# Patient Record
Sex: FEMALE | Race: WHITE | NOT HISPANIC OR LATINO | Employment: OTHER | URBAN - METROPOLITAN AREA
[De-identification: names, ages, dates, MRNs, and addresses within clinical notes are randomized per-mention and may not be internally consistent; named-entity substitution may affect disease eponyms.]

---

## 2023-10-16 PROBLEM — N89.8 VAGINAL ITCHING: Status: ACTIVE | Noted: 2023-10-16

## 2023-10-16 PROBLEM — I77.811 ABDOMINAL AORTIC ECTASIA (CMS-HCC): Status: ACTIVE | Noted: 2023-10-16

## 2023-10-16 PROBLEM — E66.9 OBESITY: Status: ACTIVE | Noted: 2023-10-16

## 2023-10-16 PROBLEM — R05.3 CHRONIC COUGH: Status: ACTIVE | Noted: 2023-10-16

## 2023-10-16 PROBLEM — N95.8 GENITOURINARY SYNDROME OF MENOPAUSE: Status: ACTIVE | Noted: 2023-10-16

## 2023-10-16 PROBLEM — R39.15 URINARY URGENCY: Status: ACTIVE | Noted: 2023-10-16

## 2023-10-16 PROBLEM — L98.7 EXCESS SKIN OF ABDOMEN: Status: ACTIVE | Noted: 2023-10-16

## 2023-10-16 PROBLEM — E65 ABDOMINAL PANNUS: Status: ACTIVE | Noted: 2023-10-16

## 2023-10-16 PROBLEM — N39.41 URGE INCONTINENCE OF URINE: Status: ACTIVE | Noted: 2023-10-16

## 2023-10-16 PROBLEM — G47.09 OTHER INSOMNIA: Status: ACTIVE | Noted: 2023-10-16

## 2023-10-16 PROBLEM — F32.5 DEPRESSION, MAJOR, IN REMISSION (CMS-HCC): Status: ACTIVE | Noted: 2023-10-16

## 2023-10-16 PROBLEM — M19.039 OSTEOARTHRITIS OF WRIST: Status: ACTIVE | Noted: 2023-10-16

## 2023-10-16 PROBLEM — R35.1 NOCTURIA: Status: ACTIVE | Noted: 2023-10-16

## 2023-10-16 PROBLEM — R20.2 NUMBNESS AND TINGLING: Status: ACTIVE | Noted: 2023-10-16

## 2023-10-16 PROBLEM — R20.0 NUMBNESS AND TINGLING: Status: ACTIVE | Noted: 2023-10-16

## 2023-10-16 PROBLEM — E66.9 CLASS 2 OBESITY WITH BODY MASS INDEX (BMI) OF 35.0 TO 35.9 IN ADULT: Status: ACTIVE | Noted: 2023-10-16

## 2023-10-16 PROBLEM — L91.8 CUTANEOUS SKIN TAGS: Status: ACTIVE | Noted: 2023-10-16

## 2023-10-16 PROBLEM — E66.812 CLASS 2 OBESITY WITH BODY MASS INDEX (BMI) OF 35.0 TO 35.9 IN ADULT: Status: ACTIVE | Noted: 2023-10-16

## 2023-10-16 PROBLEM — J34.89 RHINORRHEA: Status: ACTIVE | Noted: 2023-10-16

## 2023-10-16 PROBLEM — H90.3 SENSORINEURAL HEARING LOSS OF BOTH EARS: Status: ACTIVE | Noted: 2023-10-16

## 2023-10-16 PROBLEM — R19.4 CHANGE IN BOWEL HABITS: Status: ACTIVE | Noted: 2023-10-16

## 2023-10-16 PROBLEM — F41.9 ANXIETY: Status: ACTIVE | Noted: 2023-10-16

## 2023-10-16 PROBLEM — N30.20 CHRONIC CYSTITIS: Status: ACTIVE | Noted: 2023-10-16

## 2023-10-16 PROBLEM — Z79.899 LONG-TERM CURRENT USE OF BENZODIAZEPINE: Status: ACTIVE | Noted: 2023-10-16

## 2023-10-16 PROBLEM — R73.01 IMPAIRED FASTING GLUCOSE: Status: ACTIVE | Noted: 2023-10-16

## 2023-10-16 PROBLEM — R60.9 DEPENDENT EDEMA: Status: ACTIVE | Noted: 2023-10-16

## 2023-10-16 PROBLEM — K21.9 GERD (GASTROESOPHAGEAL REFLUX DISEASE): Status: ACTIVE | Noted: 2023-10-16

## 2023-10-16 PROBLEM — E78.00 HYPERCHOLESTEREMIA: Status: ACTIVE | Noted: 2023-10-16

## 2023-10-16 RX ORDER — ALBUTEROL SULFATE 90 UG/1
2 AEROSOL, METERED RESPIRATORY (INHALATION) AS NEEDED
COMMUNITY
Start: 2018-01-12 | End: 2024-02-19 | Stop reason: SDUPTHER

## 2023-10-16 RX ORDER — VIT C/ZN GLUC/HERBAL NO.325 90 MG-15MG
LOZENGE MUCOUS MEMBRANE
Status: ON HOLD | COMMUNITY
Start: 2021-08-24 | End: 2023-11-25 | Stop reason: WASHOUT

## 2023-10-16 RX ORDER — OMEPRAZOLE 20 MG/1
20 CAPSULE, DELAYED RELEASE ORAL
COMMUNITY
Start: 2014-05-19

## 2023-10-16 RX ORDER — BUPROPION HYDROCHLORIDE 300 MG/1
300 TABLET ORAL EVERY MORNING
COMMUNITY
Start: 2017-06-29 | End: 2023-10-24 | Stop reason: SDUPTHER

## 2023-10-16 RX ORDER — KETOROLAC TROMETHAMINE 10 MG/1
10 TABLET, FILM COATED ORAL 4 TIMES DAILY PRN
Status: ON HOLD | COMMUNITY
End: 2023-11-25 | Stop reason: WASHOUT

## 2023-10-16 RX ORDER — VITAMIN B COMPLEX
1 CAPSULE ORAL DAILY
COMMUNITY
Start: 2014-05-19 | End: 2024-03-11 | Stop reason: ALTCHOICE

## 2023-10-16 RX ORDER — ECHINACEA 500 MG
1 CAPSULE ORAL 2 TIMES DAILY
COMMUNITY
Start: 2021-04-21 | End: 2024-03-11 | Stop reason: ALTCHOICE

## 2023-10-16 RX ORDER — BIOTIN 5000 MCG
1 TABLET,DISINTEGRATING ORAL DAILY
Status: ON HOLD | COMMUNITY
Start: 2014-05-19 | End: 2023-11-25 | Stop reason: WASHOUT

## 2023-10-16 RX ORDER — CLOTRIMAZOLE AND BETAMETHASONE DIPROPIONATE 10; .64 MG/G; MG/G
1 CREAM TOPICAL 2 TIMES DAILY
Status: ON HOLD | COMMUNITY
Start: 2021-08-24 | End: 2023-11-25 | Stop reason: WASHOUT

## 2023-10-16 RX ORDER — GINKGO BILOBA LEAF EXTRACT 60 MG
CAPSULE ORAL
COMMUNITY
Start: 2014-05-19 | End: 2024-03-11 | Stop reason: ALTCHOICE

## 2023-10-16 RX ORDER — SOLIFENACIN SUCCINATE 5 MG/1
1 TABLET, FILM COATED ORAL EVERY EVENING
Status: ON HOLD | COMMUNITY
Start: 2021-08-24 | End: 2023-11-25 | Stop reason: WASHOUT

## 2023-10-16 RX ORDER — CALCIUM/MAGNESIUM 300-300 MG
1 TABLET ORAL DAILY
COMMUNITY
Start: 2014-05-19 | End: 2024-03-11 | Stop reason: ALTCHOICE

## 2023-10-16 RX ORDER — ESTRADIOL 0.1 MG/G
2 CREAM VAGINAL 3 TIMES WEEKLY
Status: ON HOLD | COMMUNITY
Start: 2021-08-24 | End: 2023-11-25 | Stop reason: WASHOUT

## 2023-10-16 RX ORDER — LORAZEPAM 1 MG/1
1 TABLET ORAL 2 TIMES DAILY PRN
Status: ON HOLD | COMMUNITY
Start: 2014-05-19 | End: 2023-11-25 | Stop reason: WASHOUT

## 2023-10-16 RX ORDER — PEPPERMINT OIL
OIL (ML) MISCELLANEOUS
COMMUNITY
Start: 2021-08-24

## 2023-10-17 ENCOUNTER — APPOINTMENT (OUTPATIENT)
Dept: UROLOGY | Facility: CLINIC | Age: 73
End: 2023-10-17
Payer: MEDICARE

## 2023-10-18 RX ORDER — LORAZEPAM 1 MG/1
1 TABLET ORAL 2 TIMES DAILY PRN
Status: CANCELLED | OUTPATIENT
Start: 2023-10-18

## 2023-10-19 DIAGNOSIS — F41.1 GAD (GENERALIZED ANXIETY DISORDER): ICD-10-CM

## 2023-10-19 RX ORDER — LORAZEPAM 1 MG/1
1 TABLET ORAL 2 TIMES DAILY
Qty: 60 TABLET | Refills: 1 | Status: SHIPPED | OUTPATIENT
Start: 2023-10-19 | End: 2023-10-24 | Stop reason: SDUPTHER

## 2023-10-19 NOTE — PROGRESS NOTES
Non billable service , reviewed chart after patient called and requested lorazepam . She still needs to have the urine drug screen , will ask nursing to check with her to see if she did have it done already .ordered lorazepam , oarrs with no concerns

## 2023-10-23 ENCOUNTER — DOCUMENTATION (OUTPATIENT)
Dept: BEHAVIORAL HEALTH | Facility: CLINIC | Age: 73
End: 2023-10-23
Payer: MEDICARE

## 2023-10-23 NOTE — PROGRESS NOTES
Non billable note after received message that the task is overdue . On 10/19 /23 lorazepam was sent to pharmacy , and shows transmission to pharmacy in system .  Ran oarrs today to verify was filled  , patient filled lorazepam on oct 19th , 2023, most recently. She has not called me back ,. Staff last week indicated she was asking for a call. Need to verify that we have results for uds , however she is scheduled for a routine follow up appointment tomorrow . Notified staff that I am [facility]

## 2023-10-24 ENCOUNTER — OFFICE VISIT (OUTPATIENT)
Dept: BEHAVIORAL HEALTH | Facility: CLINIC | Age: 73
End: 2023-10-24
Payer: MEDICARE

## 2023-10-24 DIAGNOSIS — G47.09 OTHER INSOMNIA: ICD-10-CM

## 2023-10-24 DIAGNOSIS — F41.1 GAD (GENERALIZED ANXIETY DISORDER): Primary | ICD-10-CM

## 2023-10-24 DIAGNOSIS — F32.5 DEPRESSION, MAJOR, IN REMISSION (CMS-HCC): ICD-10-CM

## 2023-10-24 PROCEDURE — 99214 OFFICE O/P EST MOD 30 MIN: CPT | Performed by: CLINICAL NURSE SPECIALIST

## 2023-10-24 PROCEDURE — 1160F RVW MEDS BY RX/DR IN RCRD: CPT | Performed by: CLINICAL NURSE SPECIALIST

## 2023-10-24 PROCEDURE — 1159F MED LIST DOCD IN RCRD: CPT | Performed by: CLINICAL NURSE SPECIALIST

## 2023-10-24 RX ORDER — BUPROPION HYDROCHLORIDE 300 MG/1
300 TABLET ORAL EVERY MORNING
Qty: 90 TABLET | Refills: 1 | Status: SHIPPED | OUTPATIENT
Start: 2023-10-24 | End: 2023-12-05 | Stop reason: SDUPTHER

## 2023-10-24 RX ORDER — LORAZEPAM 1 MG/1
1 TABLET ORAL 2 TIMES DAILY
Qty: 60 TABLET | Refills: 1 | Status: SHIPPED | OUTPATIENT
Start: 2023-10-24 | End: 2024-04-10 | Stop reason: SDUPTHER

## 2023-10-24 NOTE — PROGRESS NOTES
Outpatient Psychiatry      Subjective   Janee Maddox, a 73 y.o. female, presents as an established patient for an appointment with outpatient psychiatry for follow up/medication management .    Diagnosis:   Generalized anxiety disorder  Depression major in remission    Other insomnia     Patient Active Problem List   Diagnosis    Abdominal aortic ectasia (CMS/HCC)    Abdominal pannus    Anxiety    Change in bowel habits    Chronic cough    Chronic cystitis    Cutaneous skin tags    Dependent edema    Depression, major, in remission (CMS/HCC)    Excess skin of abdomen    Genitourinary syndrome of menopause    GERD (gastroesophageal reflux disease)    Hypercholesteremia    Impaired fasting glucose    Nocturia    Numbness and tingling    Obesity    Osteoarthritis of wrist    Other insomnia    Rhinorrhea    Sensorineural hearing loss of both ears    Urge incontinence of urine    Urinary urgency    Vaginal itching    Class 2 obesity with body mass index (BMI) of 35.0 to 35.9 in adult    Long-term current use of benzodiazepine       Treatment Goals:  Specify outcomes written in observable, behavioral terms:   Anxiety: reducing negative automatic thoughts and reducing physical symptoms of anxiety    Treatment Plan/Recommendations:      can continue lorazepam 1 mg , twice a day , and can continue bupropion xl 300 mg , each morning , can continue melatonin 10 mg , at bedtime as needed. can call  , for treatment concerns or can call  for any treatment concerns.and scheduling concerns , can follow up for medications in 8 weeks. can continue self care and wellness efforts and maintain other appointments with other medical providers.     Follow-up plan was discussed with patient.    Review with patient: Treatment plan reviewed with the patient.  Medication risks/benefit reviewed with the patient    HPI:  History of Present Illness  no concerns with daily activities   no concerns with memory   no  issues with falls   no issues with substance abuse   reconciled medication list , in the [facility] emr , and psychoeducation provided   support provided   work has been busy   anxiety is manageable       I have personally reviewed the [place] report for JIMENA HERNANDEZGREGG. I have considered the risks of abuse, dependence, addiction and diversion.   I have the following concerns: no concerns on oarrs.   Is the patient prescribed a combination of a benzodiazepine and opioid? No.   Last urine drug screening date/ordered today: uds ordered 6/12/23 patient says she had this done , need to call lab   Date of the last Controlled Substance Agreement: signed paper copy , csa in person appointment on 10/24/23  BENZODIAZEPINES   What is the patient's goal of therapy? to manage pepe and support daily functioning.  Is this being achieved with current treatment? yes , she can attend to daily activities without interruption of intense anxiety.   PEPE-7   1. Feeling nervous, anxious or on edge- nearly every day  2. Not being able to stop or control worrying - nearly every day  3. Worrying too much about different things - nearly every day  4. Trouble relaxing - more than half the days  5. Being so restless that it is hard to sit still - not at all  6. Becoming easily annoyed or irritable - several days  7. Feeling afraid as if something awful might happen - not at all  Total Score = 12  Activities of Daily Living:   Yes, it is my opinion that this patient is benefitting from benzodiazepine therapy.   Physical functioning: Same   Family relationships: Same   Social relationships: Same   Mood: Same   Sleep patterns: Same   Overall functioning: Better   Current or Past Use of Non-Controlled Medication: Dopamine/Norepinephrine Reuptake Inhibitor.      Review of Systems     Depressive Symptoms: not depressed or irritable,~no loss of interest,~no change in appetite,~no recent lb weight gain,~no recent lb weight loss,~no insomnia,~no fatigue or loss  of energy,~not feeling worthless or guilty,~normal concentration,~ability to make decisions,~no suicidal ideation,~no guns or weapons in household.   Manic Symptoms: mood is not irritable or elevated,~self esteem is not grandiose or increased,~no changes in need for sleep,~not more talkative than usual,~does not have flight of ideas or racing thoughts,~no distractibility,~no psychomotor agitation or increased goal-directed activity,~no excessive involvement in pleasurable activities.   Psychotic Symptoms: no hallucinations,~no delusions,~no disorganized speech,~does not have disorganized behavior or catatonia,~no negative symptoms.   Anxiety Symptoms: difficulty controlling worry,~increased arousal,~exposure to traumatic event,~re-experiencing of traumatic event, but~no panic attacks,~no concerns about future panic attacks,~no worry about panic attack consequences,~no change in behavior due to panic attacks,~no excessive worry,~not easily fatigued due to worry,~no difficulty concentrating due to worry,~no irritability due to worry,~no muscle tension due to worry,~no sleep disturbances due to worry,~no specific phobia,~no social phobia,~no obsessions,~no compulsions,~no avoidance of stimuli and number of responsiveness,~no restlessness / feeling on edge due to worry.   Delirium/ Altered Mental Status Symptoms: no disturbances of consciousness,~no diminished ability to focus, sustain, shift attention,~no change in cognition or perceptual disturbances,~symptoms do not fluctuate during the course of the day,~general medical condition is not present.   Disordered Eating Symptoms: weight is not less than 85% of ideal body weight,~no intense fear of gaining weight,~does not have a poor body image,~no restricting of diet and/or excessive exercise,~no purging or laxative use.   Post-traumatic stress disorder symptoms The patient is currently asymptomatic.   Inattentive Symptoms: does not make careless mistakes often,~does  not have difficulty paying attention,~not often disorganized,~does not lose things often,~is not easily distracted,~is not often forgetful,~does not avoid/dislike tasks with sustained mental effort,~listens when spoken to directly,~is able to follow instructions and finish schoolwork.   Conduct Issues: no aggression towards people or animals,~no destruction of property,~no deceitfulness,~does not violate rules.   Other Symptoms/ Concerns: no symptoms of separation anxiety,~no reactive attachment symptoms,~no motor tics,~no vocal tics,~no stuttering,~no phonological problems,~no loss of urine control,~no encopresis,~no intellectual disability,~no self-injurious behaviors,~not somatic and no conversion symptoms,~no gender identity symptoms,~no sleep disorder symptoms,~no impulse control symptoms,~no personality disorder symptoms.       Constitutional: no sleep apnea,~normal sleeping,~no night wakings,~no snoring,~not a picky eater,~normal appetite,~no swallowing problems,~no night terrors,~no nightmares,~no restless sleep,~no snorts/gasps~and~no obesity.   Eyes: vision impairment~and~wears glasses/contacts, but~no vision test,~does not wear glassess/contacts~and~no blindness.   ENT: no hearing tested,~no hearing loss,~no hearing aid,~no cochlear implant,~no excessive drooling,~no dental problems~and~no recurrent strep throat.   Cardiovascular: no murmur,~no heart defect,~no chest pain,~no palpitations~and~no syncope.   Respiratory: no wheezing~and~no asthma/reactive airway disease.   Gastrointestinal: no constipation,~no abdominal pain,~no nausea,~no vomiting,~no diarrhea,~no blood in stools,~no g-tube~and~no reflux.   Genitourinary: no nocturnal enuresis,~no diurnal enuresis~and~no incontinence.   Musculoskeletal: normal gait~and~no torticollis, but~moving all extremities well and symmetrical,~no arthritis or joint problems,~no myalgias,~no muscle weakness~and~normal hand preference.   Integumentary: no changes in  moles or birthmarks,~no rashes~and~no atopic dermatitis.   Neurological: no symmetrical facies,~no headache,~no head injury,~no seizures,~no staring spells,~no loss of consciousness,~no meningitis/encephalitis,~no cerebral palsy,~no spina bifida,~no stereotypy,~no developmental regression~and~no tics or twitches.   Endocrine: no temperature intolerance,~,~good growth~and~no failure to thrive.   Hematologic/Lymphatic: no anemia~and~no lead poisoning.        Current Medications:    Current Outpatient Medications:     albuterol (Ventolin HFA) 90 mcg/actuation inhaler, Inhale 2 puffs if needed for wheezing or shortness of breath (every 4 to 6 hours as needed)., Disp: , Rfl:     b complex vitamins capsule, Take 1 capsule by mouth once daily., Disp: , Rfl:     buPROPion XL (Wellbutrin XL) 300 mg 24 hr tablet, Take 1 tablet (300 mg) by mouth once daily in the morning., Disp: , Rfl:     calcium-magnesium 300-300 mg tablet, Take 1 tablet by mouth once daily., Disp: , Rfl:     clotrimazole-betamethasone (Lotrisone) cream, Apply 1 Application topically 2 times a day. APPLY AND RUB IN A THIN FILM TO THE AFFECTED AREAS IN THE MORNING AND EVENING, Disp: , Rfl:     cranberry fruit concentrate 125 mg tablet,disintegrating, Take 1 tablet by mouth once daily., Disp: , Rfl:     echinacea 500 mg capsule, Take 1 capsule by mouth 2 times a day., Disp: , Rfl:     estradiol (Estrace) 0.01 % (0.1 mg/gram) vaginal cream, Insert 2 g into the vagina 3 (three) times a week. AMOUNT TO VAGINAL OPENING EVERY MONDAY, WEDNESDAY, AND FRIDAY EVENING., Disp: , Rfl:     ginseng 100 mg capsule, Take by mouth.  TAKE AS DIRECTED., Disp: , Rfl:     ketorolac (Toradol) 10 mg tablet, Take 1 tablet (10 mg) by mouth 4 times a day as needed for moderate pain (4 - 6)., Disp: , Rfl:     Lactobac. rhamnosus GG-inulin 20 billion cell -200 mg capsule, Take by mouth., Disp: , Rfl:     lecithin/ginkgo/S.ginseng/gotu (GINKGO BILOBA PLUS ORAL), Take 1 tablet by mouth  once daily., Disp: , Rfl:     LORazepam (Ativan) 1 mg tablet, Take 1 tablet (1 mg) by mouth 2 times a day as needed., Disp: , Rfl:     LORazepam (Ativan) 1 mg tablet, Take 1 tablet (1 mg) by mouth 2 times a day., Disp: 60 tablet, Rfl: 1    omeprazole (PriLOSEC) 20 mg DR capsule, Take 1 capsule (20 mg) by mouth once daily in the morning. Take before meals., Disp: , Rfl:     peppermint oiL liquid, Apply topically., Disp: , Rfl:     solifenacin (VESIcare) 5 mg tablet, Take 1 tablet (5 mg) by mouth once daily in the evening., Disp: , Rfl:     vit C-Zn gluc-herbal no.325 (Elderberry Zinc Vit C) 90-15 mg lozenge, Take by mouth., Disp: , Rfl:   Medical History:  Past Medical History:   Diagnosis Date    Agoraphobia, unspecified 12/06/2016    Agoraphobia    Body mass index (BMI) 33.0-33.9, adult     BMI 33.0-33.9,adult    Chronic sinusitis, unspecified     Recurrent sinus infections    Cutaneous abscess, unspecified 06/01/2016    Abscess    Disruption of external operation (surgical) wound, not elsewhere classified, initial encounter 07/31/2017    Open abdominal incision with drainage    Diverticulitis of intestine, part unspecified, without perforation or abscess without bleeding 08/13/2018    Acute diverticulitis    Encounter for immunization 04/21/2021    Encounter for immunization    Encounter for screening for lipoid disorders 04/26/2016    Screening cholesterol level    Incisional hernia without obstruction or gangrene 09/11/2015    Incisional hernia    Infection following a procedure, other surgical site, initial encounter 04/20/2017    Incisional infection    Local infection of the skin and subcutaneous tissue, unspecified 05/01/2019    Skin infection    Other conditions influencing health status     Complete Colonoscopy    Other specified postprocedural states 05/15/2018    History of incision and drainage    Otitis media, unspecified, left ear 05/19/2014    Otitis media, left    Pain in right knee 02/16/2015     Bilateral knee pain    Personal history of other diseases of the circulatory system     History of hypertension    Personal history of other infectious and parasitic diseases 07/03/2018    History of onychomycosis    Personal history of other infectious and parasitic diseases 10/18/2019    History of Clostridioides difficile colitis    Personal history of other specified conditions 04/20/2017    History of dysuria    Personal history of other specified conditions 02/02/2017    History of nocturia    Personal history of other specified conditions 10/18/2019    History of diarrhea    Personal history of other specified conditions 12/06/2019    History of dysuria    Personal history of other specified conditions 02/16/2015    History of dyspnea    Personal history of urinary (tract) infections 12/11/2019    History of urinary tract infection    Syncope and collapse 01/05/2018    Collapse    Tinnitus, left ear 08/29/2014    Tinnitus of left ear    Unspecified open wound of abdominal wall, unspecified quadrant without penetration into peritoneal cavity, initial encounter 08/23/2019    Wound, open, abdominal wall, anterior     Surgical History:  Past Surgical History:   Procedure Laterality Date    CARPAL TUNNEL RELEASE  05/20/2014    Neuroplasty Decompression Median Nerve At Carpal Tunnel    CHOLECYSTECTOMY  05/20/2014    Cholecystectomy    COLONOSCOPY  01/08/2021    Complete Colonoscopy    HYSTERECTOMY  05/20/2014    Hysterectomy    KNEE ARTHROSCOPY W/ DEBRIDEMENT  02/16/2015    Knee Arthroscopy (Therapeutic)    OTHER SURGICAL HISTORY  08/20/2019    Incisional Hernia Repair    RECTAL VAGINAL FISTULECTOMY  05/20/2014    Rectocele Repair     Family History:  Family History   Problem Relation Name Age of Onset    Hypertension Mother      Other (cardiac disorder) Father      Heart attack Maternal Grandfather       Social History:  Social History     Socioeconomic History    Marital status:      Spouse name: Not on  file    Number of children: Not on file    Years of education: Not on file    Highest education level: Not on file   Occupational History    Not on file   Tobacco Use    Smoking status: Not on file    Smokeless tobacco: Not on file   Substance and Sexual Activity    Alcohol use: Not on file    Drug use: Not on file    Sexual activity: Not on file   Other Topics Concern    Not on file   Social History Narrative    Not on file     Social Determinants of Health     Financial Resource Strain: Not on file   Food Insecurity: Not on file   Transportation Needs: Not on file   Physical Activity: Not on file   Stress: Not on file   Social Connections: Not on file   Intimate Partner Violence: Not on file   Housing Stability: Not on file     Record Review: brief

## 2023-11-19 PROBLEM — I25.84 CORONARY ATHEROSCLEROSIS DUE TO SEVERELY CALCIFIED CORONARY LESION: Status: ACTIVE | Noted: 2023-11-19

## 2023-11-19 PROBLEM — K42.9 UMBILICAL HERNIA: Status: ACTIVE | Noted: 2023-11-19

## 2023-11-19 PROBLEM — A04.72 C. DIFFICILE DIARRHEA: Status: ACTIVE | Noted: 2018-07-26

## 2023-11-19 PROBLEM — J32.9 RECURRENT SINUSITIS: Status: ACTIVE | Noted: 2023-11-19

## 2023-11-19 PROBLEM — F41.9 ANXIETY AND DEPRESSION: Status: ACTIVE | Noted: 2023-11-19

## 2023-11-19 PROBLEM — E66.9 OBESITY, CLASS I, BMI 30-34.9: Status: ACTIVE | Noted: 2018-07-27

## 2023-11-19 PROBLEM — B35.1 ONYCHOMYCOSIS: Status: ACTIVE | Noted: 2023-11-19

## 2023-11-19 PROBLEM — J84.9 ILD (INTERSTITIAL LUNG DISEASE) (MULTI): Status: ACTIVE | Noted: 2023-11-19

## 2023-11-19 PROBLEM — R55 COLLAPSE: Status: ACTIVE | Noted: 2023-11-19

## 2023-11-19 PROBLEM — E66.9 OBESITY WITH BODY MASS INDEX 30 OR GREATER: Status: ACTIVE | Noted: 2023-11-19

## 2023-11-19 PROBLEM — J44.9 COPD (CHRONIC OBSTRUCTIVE PULMONARY DISEASE) (MULTI): Status: ACTIVE | Noted: 2023-11-19

## 2023-11-19 PROBLEM — F41.0 GENERALIZED ANXIETY DISORDER WITH PANIC ATTACKS: Status: ACTIVE | Noted: 2023-11-19

## 2023-11-19 PROBLEM — F32.A ANXIETY AND DEPRESSION: Status: ACTIVE | Noted: 2023-11-19

## 2023-11-19 PROBLEM — I25.10 CAD (CORONARY ARTERY DISEASE): Status: ACTIVE | Noted: 2023-11-19

## 2023-11-19 PROBLEM — R91.8 ABNORMAL CT SCAN, LUNG: Status: ACTIVE | Noted: 2023-11-19

## 2023-11-19 PROBLEM — F17.219 CIGARETTE NICOTINE DEPENDENCE WITH NICOTINE-INDUCED DISORDER: Status: ACTIVE | Noted: 2023-11-19

## 2023-11-19 PROBLEM — Z16.12 ESBL (EXTENDED SPECTRUM BETA-LACTAMASE) PRODUCING BACTERIA INFECTION: Status: ACTIVE | Noted: 2023-11-19

## 2023-11-19 PROBLEM — K57.92 DIVERTICULITIS OF INTESTINE: Status: ACTIVE | Noted: 2023-11-19

## 2023-11-19 PROBLEM — L02.91 ABSCESS: Status: ACTIVE | Noted: 2023-11-19

## 2023-11-19 PROBLEM — E66.811 OBESITY, CLASS I, BMI 30-34.9: Status: ACTIVE | Noted: 2018-07-27

## 2023-11-19 PROBLEM — R91.1 RIGHT MIDDLE LOBE PULMONARY NODULE: Status: ACTIVE | Noted: 2023-11-19

## 2023-11-19 PROBLEM — F41.1 GENERALIZED ANXIETY DISORDER WITH PANIC ATTACKS: Status: ACTIVE | Noted: 2023-11-19

## 2023-11-19 PROBLEM — S31.109A OPEN WOUND OF ANTERIOR ABDOMINAL WALL: Status: ACTIVE | Noted: 2023-11-19

## 2023-11-19 PROBLEM — H66.90 OTITIS MEDIA: Status: ACTIVE | Noted: 2023-11-19

## 2023-11-19 PROBLEM — G47.00 INSOMNIA: Status: ACTIVE | Noted: 2023-11-19

## 2023-11-19 PROBLEM — A49.9 ESBL (EXTENDED SPECTRUM BETA-LACTAMASE) PRODUCING BACTERIA INFECTION: Status: ACTIVE | Noted: 2023-11-19

## 2023-11-19 PROBLEM — F17.200 NICOTINE USE DISORDER: Chronic | Status: ACTIVE | Noted: 2018-07-27

## 2023-11-19 PROBLEM — H93.19 TINNITUS: Status: ACTIVE | Noted: 2023-11-19

## 2023-11-19 PROBLEM — F41.0 PANIC ATTACK: Status: ACTIVE | Noted: 2023-11-19

## 2023-11-19 PROBLEM — K43.2 INCISIONAL HERNIA: Status: ACTIVE | Noted: 2023-11-19

## 2023-11-19 PROBLEM — K57.90 DIVERTICULOSIS: Status: ACTIVE | Noted: 2023-11-19

## 2023-11-19 PROBLEM — M25.561 RIGHT KNEE PAIN: Status: ACTIVE | Noted: 2023-11-19

## 2023-11-19 PROBLEM — H90.3 SENSORINEURAL HEARING LOSS, BILATERAL: Status: ACTIVE | Noted: 2023-11-19

## 2023-11-19 PROBLEM — E78.00 PURE HYPERCHOLESTEROLEMIA: Status: ACTIVE | Noted: 2023-11-19

## 2023-11-19 PROBLEM — L98.7 EXCESS SKIN OF ABDOMINAL WALL: Status: ACTIVE | Noted: 2023-11-19

## 2023-11-19 PROBLEM — F40.00 AGORAPHOBIA: Status: ACTIVE | Noted: 2023-11-19

## 2023-11-19 PROBLEM — I77.811 AORTIC ECTASIA, ABDOMINAL (CMS-HCC): Status: ACTIVE | Noted: 2018-07-27

## 2023-11-19 PROBLEM — R20.0 NUMBNESS AND TINGLING SENSATION OF SKIN: Status: ACTIVE | Noted: 2023-11-19

## 2023-11-19 PROBLEM — R20.2 NUMBNESS AND TINGLING SENSATION OF SKIN: Status: ACTIVE | Noted: 2023-11-19

## 2023-11-19 PROBLEM — N39.0 UTI (URINARY TRACT INFECTION): Status: ACTIVE | Noted: 2019-10-13

## 2023-11-19 PROBLEM — L91.8 SKIN TAG: Status: ACTIVE | Noted: 2023-11-19

## 2023-11-19 PROBLEM — K21.9 GASTROESOPHAGEAL REFLUX DISEASE: Status: ACTIVE | Noted: 2023-11-19

## 2023-11-19 PROBLEM — R19.7 DIARRHEA: Status: ACTIVE | Noted: 2023-11-19

## 2023-11-19 PROBLEM — R30.0 DYSURIA: Status: ACTIVE | Noted: 2023-11-19

## 2023-11-19 PROBLEM — M25.569 KNEE PAIN: Status: ACTIVE | Noted: 2023-11-19

## 2023-11-19 RX ORDER — CIPROFLOXACIN HYDROCHLORIDE 3 MG/ML
5 SOLUTION/ DROPS OPHTHALMIC 2 TIMES DAILY
Status: ON HOLD | COMMUNITY
Start: 2021-08-12 | End: 2023-11-25 | Stop reason: WASHOUT

## 2023-11-19 RX ORDER — METRONIDAZOLE 500 MG/1
500 TABLET ORAL EVERY 12 HOURS
COMMUNITY
Start: 2022-12-15 | End: 2022-12-22

## 2023-11-19 RX ORDER — MAGNESIUM 200 MG
1 TABLET ORAL
Status: ON HOLD | COMMUNITY
End: 2023-11-25 | Stop reason: WASHOUT

## 2023-11-19 RX ORDER — AZITHROMYCIN 250 MG/1
TABLET, FILM COATED ORAL
Status: ON HOLD | COMMUNITY
Start: 2023-05-28 | End: 2023-11-25 | Stop reason: WASHOUT

## 2023-11-19 RX ORDER — LEVOFLOXACIN 500 MG/1
TABLET, FILM COATED ORAL
Status: ON HOLD | COMMUNITY
Start: 2022-12-15 | End: 2023-11-25 | Stop reason: WASHOUT

## 2023-11-19 RX ORDER — ROSUVASTATIN CALCIUM 10 MG/1
TABLET, COATED ORAL
COMMUNITY
Start: 2023-08-14 | End: 2024-03-11 | Stop reason: ALTCHOICE

## 2023-11-19 RX ORDER — GRANULES FOR ORAL 3 G/1
POWDER ORAL
Status: ON HOLD | COMMUNITY
Start: 2023-09-27 | End: 2023-11-25 | Stop reason: WASHOUT

## 2023-11-19 RX ORDER — CEPHALEXIN 500 MG/1
500 CAPSULE ORAL 4 TIMES DAILY
COMMUNITY
Start: 2022-12-21 | End: 2022-12-28

## 2023-11-19 RX ORDER — BUDESONIDE, GLYCOPYRROLATE, AND FORMOTEROL FUMARATE 160; 9; 4.8 UG/1; UG/1; UG/1
AEROSOL, METERED RESPIRATORY (INHALATION)
Status: ON HOLD | COMMUNITY
Start: 2023-10-19 | End: 2023-11-25 | Stop reason: ALTCHOICE

## 2023-11-25 ENCOUNTER — APPOINTMENT (OUTPATIENT)
Dept: RADIOLOGY | Facility: HOSPITAL | Age: 73
DRG: 180 | End: 2023-11-25
Payer: MEDICARE

## 2023-11-25 ENCOUNTER — HOSPITAL ENCOUNTER (INPATIENT)
Facility: HOSPITAL | Age: 73
LOS: 3 days | Discharge: HOME | DRG: 180 | End: 2023-11-28
Attending: INTERNAL MEDICINE | Admitting: STUDENT IN AN ORGANIZED HEALTH CARE EDUCATION/TRAINING PROGRAM
Payer: MEDICARE

## 2023-11-25 DIAGNOSIS — J98.4 CAVITARY LESION OF LUNG: Primary | ICD-10-CM

## 2023-11-25 DIAGNOSIS — R91.8 ABNORMAL CT SCAN, LUNG: ICD-10-CM

## 2023-11-25 DIAGNOSIS — R91.1 RIGHT MIDDLE LOBE PULMONARY NODULE: ICD-10-CM

## 2023-11-25 DIAGNOSIS — L02.91 ABSCESS: ICD-10-CM

## 2023-11-25 LAB
ABO GROUP (TYPE) IN BLOOD: NORMAL
ALBUMIN SERPL BCP-MCNC: 3.1 G/DL (ref 3.4–5)
ALP SERPL-CCNC: 51 U/L (ref 33–136)
ALT SERPL W P-5'-P-CCNC: 13 U/L (ref 7–45)
ANION GAP SERPL CALC-SCNC: 16 MMOL/L (ref 10–20)
ANTIBODY SCREEN: NORMAL
APPEARANCE UR: CLEAR
APTT PPP: 36 SECONDS (ref 27–38)
AST SERPL W P-5'-P-CCNC: 13 U/L (ref 9–39)
BASOPHILS # BLD AUTO: 0.09 X10*3/UL (ref 0–0.1)
BASOPHILS NFR BLD AUTO: 1 %
BILIRUB SERPL-MCNC: 0.5 MG/DL (ref 0–1.2)
BILIRUB UR STRIP.AUTO-MCNC: NEGATIVE MG/DL
BUN SERPL-MCNC: 14 MG/DL (ref 6–23)
CALCIUM SERPL-MCNC: 9 MG/DL (ref 8.6–10.3)
CHLORIDE SERPL-SCNC: 99 MMOL/L (ref 98–107)
CO2 SERPL-SCNC: 25 MMOL/L (ref 21–32)
COLOR UR: ABNORMAL
CREAT SERPL-MCNC: 1.08 MG/DL (ref 0.5–1.05)
EOSINOPHIL # BLD AUTO: 0.92 X10*3/UL (ref 0–0.4)
EOSINOPHIL NFR BLD AUTO: 10.4 %
ERYTHROCYTE [DISTWIDTH] IN BLOOD BY AUTOMATED COUNT: 15.3 % (ref 11.5–14.5)
GFR SERPL CREATININE-BSD FRML MDRD: 54 ML/MIN/1.73M*2
GLUCOSE SERPL-MCNC: 89 MG/DL (ref 74–99)
GLUCOSE UR STRIP.AUTO-MCNC: NEGATIVE MG/DL
HCT VFR BLD AUTO: 29.8 % (ref 36–46)
HGB BLD-MCNC: 8.9 G/DL (ref 12–16)
HOLD SPECIMEN: NORMAL
IMM GRANULOCYTES # BLD AUTO: 0.1 X10*3/UL (ref 0–0.5)
IMM GRANULOCYTES NFR BLD AUTO: 1.1 % (ref 0–0.9)
INR PPP: 1.3 (ref 0.9–1.1)
KETONES UR STRIP.AUTO-MCNC: NEGATIVE MG/DL
LEUKOCYTE ESTERASE UR QL STRIP.AUTO: NEGATIVE
LYMPHOCYTES # BLD AUTO: 1.26 X10*3/UL (ref 0.8–3)
LYMPHOCYTES NFR BLD AUTO: 14.2 %
MAGNESIUM SERPL-MCNC: 1.94 MG/DL (ref 1.6–2.4)
MCH RBC QN AUTO: 25.5 PG (ref 26–34)
MCHC RBC AUTO-ENTMCNC: 29.9 G/DL (ref 32–36)
MCV RBC AUTO: 85 FL (ref 80–100)
MONOCYTES # BLD AUTO: 0.99 X10*3/UL (ref 0.05–0.8)
MONOCYTES NFR BLD AUTO: 11.1 %
NEUTROPHILS # BLD AUTO: 5.52 X10*3/UL (ref 1.6–5.5)
NEUTROPHILS NFR BLD AUTO: 62.2 %
NITRITE UR QL STRIP.AUTO: NEGATIVE
NRBC BLD-RTO: 0 /100 WBCS (ref 0–0)
PH UR STRIP.AUTO: 7 [PH]
PLATELET # BLD AUTO: 317 X10*3/UL (ref 150–450)
POTASSIUM SERPL-SCNC: 4.7 MMOL/L (ref 3.5–5.3)
PROT SERPL-MCNC: 6.3 G/DL (ref 6.4–8.2)
PROT UR STRIP.AUTO-MCNC: NEGATIVE MG/DL
PROTHROMBIN TIME: 15 SECONDS (ref 9.8–12.8)
RBC # BLD AUTO: 3.49 X10*6/UL (ref 4–5.2)
RBC # UR STRIP.AUTO: NEGATIVE /UL
RH FACTOR (ANTIGEN D): NORMAL
SODIUM SERPL-SCNC: 135 MMOL/L (ref 136–145)
SP GR UR STRIP.AUTO: 1
UROBILINOGEN UR STRIP.AUTO-MCNC: <2 MG/DL
WBC # BLD AUTO: 8.9 X10*3/UL (ref 4.4–11.3)

## 2023-11-25 PROCEDURE — 2500000001 HC RX 250 WO HCPCS SELF ADMINISTERED DRUGS (ALT 637 FOR MEDICARE OP)

## 2023-11-25 PROCEDURE — 2500000001 HC RX 250 WO HCPCS SELF ADMINISTERED DRUGS (ALT 637 FOR MEDICARE OP): Performed by: STUDENT IN AN ORGANIZED HEALTH CARE EDUCATION/TRAINING PROGRAM

## 2023-11-25 PROCEDURE — 87040 BLOOD CULTURE FOR BACTERIA: CPT | Mod: PARLAB | Performed by: STUDENT IN AN ORGANIZED HEALTH CARE EDUCATION/TRAINING PROGRAM

## 2023-11-25 PROCEDURE — 86900 BLOOD TYPING SEROLOGIC ABO: CPT | Performed by: STUDENT IN AN ORGANIZED HEALTH CARE EDUCATION/TRAINING PROGRAM

## 2023-11-25 PROCEDURE — 99223 1ST HOSP IP/OBS HIGH 75: CPT | Performed by: STUDENT IN AN ORGANIZED HEALTH CARE EDUCATION/TRAINING PROGRAM

## 2023-11-25 PROCEDURE — 85730 THROMBOPLASTIN TIME PARTIAL: CPT | Performed by: STUDENT IN AN ORGANIZED HEALTH CARE EDUCATION/TRAINING PROGRAM

## 2023-11-25 PROCEDURE — 85025 COMPLETE CBC W/AUTO DIFF WBC: CPT | Performed by: STUDENT IN AN ORGANIZED HEALTH CARE EDUCATION/TRAINING PROGRAM

## 2023-11-25 PROCEDURE — 81003 URINALYSIS AUTO W/O SCOPE: CPT | Performed by: STUDENT IN AN ORGANIZED HEALTH CARE EDUCATION/TRAINING PROGRAM

## 2023-11-25 PROCEDURE — 87075 CULTR BACTERIA EXCEPT BLOOD: CPT | Mod: PARLAB | Performed by: STUDENT IN AN ORGANIZED HEALTH CARE EDUCATION/TRAINING PROGRAM

## 2023-11-25 PROCEDURE — 85610 PROTHROMBIN TIME: CPT | Performed by: STUDENT IN AN ORGANIZED HEALTH CARE EDUCATION/TRAINING PROGRAM

## 2023-11-25 PROCEDURE — 80053 COMPREHEN METABOLIC PANEL: CPT | Performed by: STUDENT IN AN ORGANIZED HEALTH CARE EDUCATION/TRAINING PROGRAM

## 2023-11-25 PROCEDURE — 2550000001 HC RX 255 CONTRASTS: Performed by: INTERNAL MEDICINE

## 2023-11-25 PROCEDURE — 36415 COLL VENOUS BLD VENIPUNCTURE: CPT | Performed by: STUDENT IN AN ORGANIZED HEALTH CARE EDUCATION/TRAINING PROGRAM

## 2023-11-25 PROCEDURE — 93010 ELECTROCARDIOGRAM REPORT: CPT | Performed by: INTERNAL MEDICINE

## 2023-11-25 PROCEDURE — 96372 THER/PROPH/DIAG INJ SC/IM: CPT | Performed by: STUDENT IN AN ORGANIZED HEALTH CARE EDUCATION/TRAINING PROGRAM

## 2023-11-25 PROCEDURE — 1200000002 HC GENERAL ROOM WITH TELEMETRY DAILY

## 2023-11-25 PROCEDURE — 83735 ASSAY OF MAGNESIUM: CPT | Performed by: STUDENT IN AN ORGANIZED HEALTH CARE EDUCATION/TRAINING PROGRAM

## 2023-11-25 PROCEDURE — 71260 CT THORAX DX C+: CPT

## 2023-11-25 PROCEDURE — 2500000004 HC RX 250 GENERAL PHARMACY W/ HCPCS (ALT 636 FOR OP/ED): Performed by: STUDENT IN AN ORGANIZED HEALTH CARE EDUCATION/TRAINING PROGRAM

## 2023-11-25 PROCEDURE — 71260 CT THORAX DX C+: CPT | Performed by: STUDENT IN AN ORGANIZED HEALTH CARE EDUCATION/TRAINING PROGRAM

## 2023-11-25 RX ORDER — MEROPENEM 1 G/1
1 INJECTION, POWDER, FOR SOLUTION INTRAVENOUS EVERY 8 HOURS
Status: DISCONTINUED | OUTPATIENT
Start: 2023-11-25 | End: 2023-11-25

## 2023-11-25 RX ORDER — MEROPENEM 1 G/1
1 INJECTION, POWDER, FOR SOLUTION INTRAVENOUS EVERY 12 HOURS
Status: DISCONTINUED | OUTPATIENT
Start: 2023-11-25 | End: 2023-11-27 | Stop reason: ALTCHOICE

## 2023-11-25 RX ORDER — ONDANSETRON HYDROCHLORIDE 2 MG/ML
4 INJECTION, SOLUTION INTRAVENOUS EVERY 6 HOURS PRN
Status: DISCONTINUED | OUTPATIENT
Start: 2023-11-25 | End: 2023-11-28 | Stop reason: HOSPADM

## 2023-11-25 RX ORDER — PANTOPRAZOLE SODIUM 20 MG/1
20 TABLET, DELAYED RELEASE ORAL
Status: DISCONTINUED | OUTPATIENT
Start: 2023-11-25 | End: 2023-11-28 | Stop reason: HOSPADM

## 2023-11-25 RX ORDER — IPRATROPIUM BROMIDE AND ALBUTEROL SULFATE 2.5; .5 MG/3ML; MG/3ML
3 SOLUTION RESPIRATORY (INHALATION) EVERY 4 HOURS PRN
Status: DISCONTINUED | OUTPATIENT
Start: 2023-11-25 | End: 2023-11-28 | Stop reason: HOSPADM

## 2023-11-25 RX ORDER — OXYBUTYNIN CHLORIDE 5 MG/1
5 TABLET ORAL 2 TIMES DAILY
Status: DISCONTINUED | OUTPATIENT
Start: 2023-11-25 | End: 2023-11-28 | Stop reason: HOSPADM

## 2023-11-25 RX ORDER — ROSUVASTATIN CALCIUM 10 MG/1
10 TABLET, COATED ORAL NIGHTLY
Status: DISCONTINUED | OUTPATIENT
Start: 2023-11-25 | End: 2023-11-28 | Stop reason: HOSPADM

## 2023-11-25 RX ORDER — SODIUM CHLORIDE 9 MG/ML
100 INJECTION, SOLUTION INTRAVENOUS CONTINUOUS
Status: ACTIVE | OUTPATIENT
Start: 2023-11-25 | End: 2023-11-25

## 2023-11-25 RX ORDER — LORAZEPAM 0.5 MG/1
1 TABLET ORAL 2 TIMES DAILY
Status: DISCONTINUED | OUTPATIENT
Start: 2023-11-25 | End: 2023-11-28 | Stop reason: HOSPADM

## 2023-11-25 RX ORDER — BUPROPION HYDROCHLORIDE 150 MG/1
300 TABLET ORAL EVERY MORNING
Status: DISCONTINUED | OUTPATIENT
Start: 2023-11-25 | End: 2023-11-28 | Stop reason: HOSPADM

## 2023-11-25 RX ORDER — TRAMADOL HYDROCHLORIDE 50 MG/1
50 TABLET ORAL EVERY 6 HOURS PRN
Status: DISCONTINUED | OUTPATIENT
Start: 2023-11-25 | End: 2023-11-28 | Stop reason: HOSPADM

## 2023-11-25 RX ORDER — UBIDECARENONE 75 MG
500 CAPSULE ORAL DAILY
Status: DISCONTINUED | OUTPATIENT
Start: 2023-11-25 | End: 2023-11-28 | Stop reason: HOSPADM

## 2023-11-25 RX ORDER — ACETAMINOPHEN 325 MG/1
650 TABLET ORAL EVERY 6 HOURS PRN
Status: DISCONTINUED | OUTPATIENT
Start: 2023-11-25 | End: 2023-11-28 | Stop reason: HOSPADM

## 2023-11-25 RX ORDER — SOLIFENACIN SUCCINATE 5 MG/1
5 TABLET, FILM COATED ORAL NIGHTLY
COMMUNITY
End: 2024-03-11 | Stop reason: ALTCHOICE

## 2023-11-25 RX ORDER — MEROPENEM 1 G/1
1 INJECTION, POWDER, FOR SOLUTION INTRAVENOUS ONCE
Status: COMPLETED | OUTPATIENT
Start: 2023-11-25 | End: 2023-11-25

## 2023-11-25 RX ORDER — HEPARIN SODIUM 5000 [USP'U]/ML
5000 INJECTION, SOLUTION INTRAVENOUS; SUBCUTANEOUS EVERY 8 HOURS
Status: DISCONTINUED | OUTPATIENT
Start: 2023-11-25 | End: 2023-11-28 | Stop reason: HOSPADM

## 2023-11-25 RX ADMIN — SODIUM CHLORIDE 500 ML: 9 INJECTION, SOLUTION INTRAVENOUS at 05:17

## 2023-11-25 RX ADMIN — HEPARIN SODIUM 5000 UNITS: 5000 INJECTION INTRAVENOUS; SUBCUTANEOUS at 05:27

## 2023-11-25 RX ADMIN — MEROPENEM 1 G: 1 INJECTION, POWDER, FOR SOLUTION INTRAVENOUS at 05:21

## 2023-11-25 RX ADMIN — ROSUVASTATIN CALCIUM 10 MG: 10 TABLET, FILM COATED ORAL at 20:32

## 2023-11-25 RX ADMIN — ACETAMINOPHEN 650 MG: 325 TABLET ORAL at 09:09

## 2023-11-25 RX ADMIN — OXYBUTYNIN CHLORIDE 5 MG: 5 TABLET ORAL at 09:09

## 2023-11-25 RX ADMIN — HEPARIN SODIUM 5000 UNITS: 5000 INJECTION INTRAVENOUS; SUBCUTANEOUS at 14:53

## 2023-11-25 RX ADMIN — Medication 5 G: at 09:09

## 2023-11-25 RX ADMIN — OXYBUTYNIN CHLORIDE 5 MG: 5 TABLET ORAL at 20:32

## 2023-11-25 RX ADMIN — MEROPENEM 1 G: 1 INJECTION, POWDER, FOR SOLUTION INTRAVENOUS at 09:09

## 2023-11-25 RX ADMIN — LORAZEPAM 1 MG: 0.5 TABLET ORAL at 09:09

## 2023-11-25 RX ADMIN — TRAMADOL HYDROCHLORIDE 50 MG: 50 TABLET, COATED ORAL at 17:47

## 2023-11-25 RX ADMIN — SODIUM CHLORIDE 100 ML/HR: 9 INJECTION, SOLUTION INTRAVENOUS at 05:15

## 2023-11-25 RX ADMIN — BUPROPION HYDROCHLORIDE 300 MG: 150 TABLET, FILM COATED, EXTENDED RELEASE ORAL at 09:08

## 2023-11-25 RX ADMIN — IOHEXOL 75 ML: 350 INJECTION, SOLUTION INTRAVENOUS at 08:40

## 2023-11-25 RX ADMIN — TRAMADOL HYDROCHLORIDE 50 MG: 50 TABLET, COATED ORAL at 09:23

## 2023-11-25 RX ADMIN — LORAZEPAM 1 MG: 0.5 TABLET ORAL at 20:31

## 2023-11-25 RX ADMIN — CYANOCOBALAMIN TAB 500 MCG 500 MCG: 500 TAB at 09:09

## 2023-11-25 RX ADMIN — MEROPENEM 1 G: 1 INJECTION, POWDER, FOR SOLUTION INTRAVENOUS at 20:31

## 2023-11-25 RX ADMIN — TRAMADOL HYDROCHLORIDE 50 MG: 50 TABLET, COATED ORAL at 20:53

## 2023-11-25 SDOH — SOCIAL STABILITY: SOCIAL INSECURITY: ARE YOU OR HAVE YOU BEEN THREATENED OR ABUSED PHYSICALLY, EMOTIONALLY, OR SEXUALLY BY ANYONE?: NO

## 2023-11-25 SDOH — SOCIAL STABILITY: SOCIAL INSECURITY: ABUSE: ADULT

## 2023-11-25 SDOH — SOCIAL STABILITY: SOCIAL INSECURITY: HAVE YOU HAD THOUGHTS OF HARMING ANYONE ELSE?: NO

## 2023-11-25 SDOH — SOCIAL STABILITY: SOCIAL INSECURITY: DO YOU FEEL UNSAFE GOING BACK TO THE PLACE WHERE YOU ARE LIVING?: NO

## 2023-11-25 SDOH — SOCIAL STABILITY: SOCIAL INSECURITY: ARE THERE ANY APPARENT SIGNS OF INJURIES/BEHAVIORS THAT COULD BE RELATED TO ABUSE/NEGLECT?: NO

## 2023-11-25 SDOH — SOCIAL STABILITY: SOCIAL INSECURITY: DOES ANYONE TRY TO KEEP YOU FROM HAVING/CONTACTING OTHER FRIENDS OR DOING THINGS OUTSIDE YOUR HOME?: NO

## 2023-11-25 SDOH — SOCIAL STABILITY: SOCIAL INSECURITY: WERE YOU ABLE TO COMPLETE ALL THE BEHAVIORAL HEALTH SCREENINGS?: YES

## 2023-11-25 SDOH — SOCIAL STABILITY: SOCIAL INSECURITY: HAS ANYONE EVER THREATENED TO HURT YOUR FAMILY OR YOUR PETS?: NO

## 2023-11-25 SDOH — SOCIAL STABILITY: SOCIAL INSECURITY: DO YOU FEEL ANYONE HAS EXPLOITED OR TAKEN ADVANTAGE OF YOU FINANCIALLY OR OF YOUR PERSONAL PROPERTY?: NO

## 2023-11-25 ASSESSMENT — LIFESTYLE VARIABLES
AUDIT-C TOTAL SCORE: 0
HOW OFTEN DO YOU HAVE A DRINK CONTAINING ALCOHOL: NEVER
HOW MANY STANDARD DRINKS CONTAINING ALCOHOL DO YOU HAVE ON A TYPICAL DAY: PATIENT DOES NOT DRINK
HOW OFTEN DO YOU HAVE 6 OR MORE DRINKS ON ONE OCCASION: NEVER
SKIP TO QUESTIONS 9-10: 1
AUDIT-C TOTAL SCORE: 0

## 2023-11-25 ASSESSMENT — PAIN DESCRIPTION - ORIENTATION: ORIENTATION: UPPER

## 2023-11-25 ASSESSMENT — PAIN SCALES - GENERAL
PAINLEVEL_OUTOF10: 8
PAINLEVEL_OUTOF10: 0 - NO PAIN
PAINLEVEL_OUTOF10: 7

## 2023-11-25 ASSESSMENT — COGNITIVE AND FUNCTIONAL STATUS - GENERAL
DAILY ACTIVITIY SCORE: 24
MOBILITY SCORE: 24
PATIENT BASELINE BEDBOUND: NO

## 2023-11-25 ASSESSMENT — PAIN - FUNCTIONAL ASSESSMENT
PAIN_FUNCTIONAL_ASSESSMENT: 0-10

## 2023-11-25 ASSESSMENT — ACTIVITIES OF DAILY LIVING (ADL)
FEEDING YOURSELF: INDEPENDENT
DRESSING YOURSELF: INDEPENDENT
ADEQUATE_TO_COMPLETE_ADL: YES
JUDGMENT_ADEQUATE_SAFELY_COMPLETE_DAILY_ACTIVITIES: YES
LACK_OF_TRANSPORTATION: NO
HEARING - RIGHT EAR: FUNCTIONAL
TOILETING: INDEPENDENT
BATHING: INDEPENDENT
WALKS IN HOME: INDEPENDENT
PATIENT'S MEMORY ADEQUATE TO SAFELY COMPLETE DAILY ACTIVITIES?: YES
HEARING - LEFT EAR: FUNCTIONAL
GROOMING: INDEPENDENT

## 2023-11-25 ASSESSMENT — COLUMBIA-SUICIDE SEVERITY RATING SCALE - C-SSRS
2. HAVE YOU ACTUALLY HAD ANY THOUGHTS OF KILLING YOURSELF?: NO
1. IN THE PAST MONTH, HAVE YOU WISHED YOU WERE DEAD OR WISHED YOU COULD GO TO SLEEP AND NOT WAKE UP?: NO
6. HAVE YOU EVER DONE ANYTHING, STARTED TO DO ANYTHING, OR PREPARED TO DO ANYTHING TO END YOUR LIFE?: NO

## 2023-11-25 ASSESSMENT — PATIENT HEALTH QUESTIONNAIRE - PHQ9
1. LITTLE INTEREST OR PLEASURE IN DOING THINGS: NOT AT ALL
SUM OF ALL RESPONSES TO PHQ9 QUESTIONS 1 & 2: 0
2. FEELING DOWN, DEPRESSED OR HOPELESS: NOT AT ALL

## 2023-11-25 ASSESSMENT — PAIN DESCRIPTION - LOCATION
LOCATION: ABDOMEN
LOCATION: OTHER (COMMENT)

## 2023-11-25 ASSESSMENT — PAIN DESCRIPTION - DESCRIPTORS
DESCRIPTORS: ACHING
DESCRIPTORS: ACHING

## 2023-11-25 NOTE — CARE PLAN
The patient's goals for the shift include      The clinical goals for the shift include to have pt's pain managed at 1/10, pain states it has been flucuating from 5-10/10      Problem: Pain - Adult  Goal: Verbalizes/displays adequate comfort level or baseline comfort level  Outcome: Progressing     Problem: Safety - Adult  Goal: Free from fall injury  Outcome: Progressing     Problem: Discharge Planning  Goal: Discharge to home or other facility with appropriate resources  Outcome: Progressing     Problem: Chronic Conditions and Co-morbidities  Goal: Patient's chronic conditions and co-morbidity symptoms are monitored and maintained or improved  Outcome: Progressing

## 2023-11-25 NOTE — H&P
History Of Present Illness  72 yo F with PMHx of CAD, HTN, HLD, abdominal aortic ectasia, COPD, Cdiff colitis, GERD, MADDI, panic disorder, depression, obesity, and tobacco dependence presents as a transfer from [facility] for a thoracic surgery evaluation. She was initially admitted to [facility] on 11/17 after being notified that she had positive blood cultures from a recent ED visit. She ultimately was found to have Pantoea bacteremia and an ESBL Ecoli UTI. She was being followed by ID and has been receiving IV abx Meropenem. In addition, she was being seen by pulmonary for a R sided cavitary lung lesion with a loculated effusion on CT. This has apparently had been seen on a prior CT and when her pulmonologist asked her about further workup she refused (pt denies ever knowing about a lung mass). She denies fever, chills, cough, sob, CP, unintentional weight loss, recent travel (did travel to [place] in May), or sick contacts. She was transferred for thoracic surgery evaluation as this service is not available at [facility].    PSHx: ventral hernia repair, carpal tunnel release, cholecystectomy, hysterectomy, protcoplasty, colonoscopy, knee arthroscopy, rectal vaginal fistulectomy     Social: 1 ppd since age 11, denies EtOH, and illicit    Fam Hx: CAD, HTN, HLD    Allergies  Nitrofurantoin monohyd/m-cryst, Codeine, Nitrofurantoin, Penicillin, Penicillin g, Penicillins, and Tetracyclines    Review of Systems   All other systems reviewed and are negative.    Physical Exam  G: aox3, NAD, cooperative  HENT: neck supple, no JVD  Eyes: clear sclera  CV: RRR s1 s2  L: clear  Abd: soft, NT, non distended  Ext: trace b/l LE edema  N: no appreciable acute focal deficits  Psych: appropriate mood and behavior    Assessment/Plan     Cavitary R lung mass, R sided loculated pleural effusion  ESBL Ecoli UTI  Pantoea bacteremia    COPD  CAD, HTN, HLD  Abdominal aortic ectasia  GERD  MADDI, panic disorder, depression   h/o  Cdiff colitis  Obesity  Tobacco dependence  DVT ppx  Full code    Plan:  - Continue IV abx Merrem, was being seen by ID at [facility], will place on consult  - Thoracic sx and pulmonary consult, will repeat chest CT as we do not have access to the actual [facility] images  - Continue home psych meds including chronic ativan      Prudencio Coreas, DO

## 2023-11-25 NOTE — Clinical Note
Report called to Kerry RN nurse. Pt to remain on bedrest, HOB 30 degrees, NPO and on 2L 02 x 3 hours until s/p chest Xray completed. Bandaid dry and intact to rt back. Core and aspirate collected in procedure.

## 2023-11-25 NOTE — CONSULTS
Consults    Reason For Consult  For evaluation of ESBL E. coli UTI by Prudencio Coreas    History Of Present Illness  Janee Maddox is a 73 y.o. female who initially presented to the emergency department on 11/8/2023 with right flank pain.  She was advised admission but left the emergency department AMA.  She returned to the emergency department on 11/15/2023 and again left AMA.  She was called on 11/16/2023 with positive blood cultures and came into [facility] on 11/16/2023 for further care.  She had a chest CT which showed a 4 x 6.5 cm right-sided cavitary lesion with loculated effusion.  She was placed on meropenem and azithromycin.  Her urine culture grew ESBL E. coli.  A request for transfer to [facility] was placed on 11/18/2023 for CT surgery evaluation.  She waited in the hospital on meropenem until the evening of 11/24/2023 when she was transferred to [place] emergency department.  She still has right flank pain that has not improved on the antibiotic and for which she is receiving pain medication.     Past Medical History  She has a past medical history of Agoraphobia, unspecified (12/06/2016), Body mass index (BMI) 33.0-33.9, adult, Chronic sinusitis, unspecified, Cutaneous abscess, unspecified (06/01/2016), Disruption of external operation (surgical) wound, not elsewhere classified, initial encounter (07/31/2017), Diverticulitis of intestine, part unspecified, without perforation or abscess without bleeding (08/13/2018), Encounter for immunization (04/21/2021), Encounter for screening for lipoid disorders (04/26/2016), Incisional hernia without obstruction or gangrene (09/11/2015), Infection following a procedure, other surgical site, initial encounter (04/20/2017), Local infection of the skin and subcutaneous tissue, unspecified (05/01/2019), Other conditions influencing health status, Other specified postprocedural states (05/15/2018), Otitis media, unspecified, left ear (05/19/2014), Pain in right  knee (02/16/2015), Personal history of other diseases of the circulatory system, Personal history of other infectious and parasitic diseases (07/03/2018), Personal history of other infectious and parasitic diseases (10/18/2019), Personal history of other specified conditions (04/20/2017), Personal history of other specified conditions (02/02/2017), Personal history of other specified conditions (10/18/2019), Personal history of other specified conditions (12/06/2019), Personal history of other specified conditions (02/16/2015), Personal history of urinary (tract) infections (12/11/2019), Syncope and collapse (01/05/2018), Tinnitus, left ear (08/29/2014), and Unspecified open wound of abdominal wall, unspecified quadrant without penetration into peritoneal cavity, initial encounter (08/23/2019).    Surgical History  She has a past surgical history that includes Hysterectomy (05/20/2014); Carpal tunnel release (05/20/2014); Cholecystectomy (05/20/2014); Rectal vaginal fistulectomy (05/20/2014); Other surgical history (08/20/2019); Knee arthroscopy w/ debridement (02/16/2015); and Colonoscopy (01/08/2021).     Social History  She reports that she has been smoking cigarettes. She has a 62.00 pack-year smoking history. She does not have any smokeless tobacco history on file. No history on file for alcohol use and drug use.    Family History  Family History   Problem Relation Name Age of Onset    Hypertension Mother      Other (cardiac disorder) Father      Heart attack Maternal Grandfather          Allergies  Nitrofurantoin monohyd/m-cryst, Codeine, Nitrofurantoin, Penicillin, Penicillin g, Penicillins, and Tetracyclines    Review of Systems  Right flank pain     Physical Exam  General: no acute distress, sitting on the edge of the bed   HEENT: pink pharynx  CVS: RRR  Resp: decreased breath sounds in bases  ABD: soft, NT, ND  EXT: no edema  Skin: no rash      Last Recorded Vitals  /62   Pulse 85   Temp 36.1 °C  (97 °F)   Resp 18   Wt 60.5 kg (133 lb 6.1 oz)   SpO2 92%     Relevant Results  11/15/2023 blood cultures show pantoea species  11/15/2023 urine culture shows mixed growth  11/16/2023 urine culture shows ESBL E. Coli    11/17/2023 urine Legionella antigen negative; urine pneumococcal antigen negative  11/17/2023 procalcitonin less than 0.05    11/15/2023 CT chest shows a 4 x 6.5 cm cavitary mass with air-fluid level in the right lower lobe associated with a loculated right effusion  11/25/2023 CT chest shows a 6 x 5 cm cavitary mass with a loculated right pleural effusion     Assessment/Plan   Cavitary right lung lesion with right loculated pleural effusion, which is awaiting CT surgery evaluation.  I have personally and independently reviewed and interpreted her laboratory tests, imaging studies, and the documentation from other healthcare providers.  I am monitoring for side effects from meropenem which can include rash, diarrhea, and bone marrow suppression.  Her prognosis is uncertain.    -Continue meropenem for now    Other issues:  #Pantoea bacteremia of unclear significance.  This could be related to the right lung process, or could be a contaminant.  We will see what further evaluation yields  #ESBL E. coli UTI, for which she has received an adequate amount of antibiotics  #Abdominal aortic aneurysm  #Hyperlipidemia  #GERD  #Cigarette smoking    Randell Schroeder MD

## 2023-11-25 NOTE — PROGRESS NOTES
Janee Maddox is a 73 y.o. female on day 0 of admission presenting with Cavitary lesion of lung.      Subjective   Patient was seen at bedside today, complains of right sided rib cage pain that has been going on for weeks.  She denies any chest pain, shortness of breath, abdominal pain, chills, fevers, dysuria    Objective     Last Recorded Vitals  /62   Pulse 85   Temp 36.1 °C (97 °F)   Resp 18   Wt 60.5 kg (133 lb 6.1 oz)   SpO2 92%   Intake/Output last 3 Shifts:    Intake/Output Summary (Last 24 hours) at 11/25/2023 1226  Last data filed at 11/25/2023 0757  Gross per 24 hour   Intake 1100 ml   Output 500 ml   Net 600 ml       Admission Weight  Weight: 59.9 kg (132 lb) (11/25/23 0130)    Daily Weight  11/25/23 : 60.5 kg (133 lb 6.1 oz)    Physical Exam    General:  Pleasant and cooperative. No apparent distress.  HEENT:  Normocephalic, atraumatic, mucus membranes moist.   Neck:  Trachea midline.  No JVD.    Chest:  Clear to auscultation bilaterally. No wheezes, rales, or rhonchi.  CV:  Regular rate and rhythm.  Positive S1/S2.   Abdomen: Bowel sounds present in all four quadrants, abdomen is soft, non-tender, non-distended.  Extremities:  No lower extremity edema or cyanosis.   Neurological:  AAOx3. No focal deficits.  Skin:  Warm and dry.      Assessment/Plan   Principal Problem:    Cavitary lesion of lung    Cavitary R lung mass, R sided loculated pleural effusion  ESBL Ecoli UTI  Pantoea bacteremia    COPD  CAD, HTN, HLD  Abdominal aortic ectasia  GERD  MADDI, panic disorder, depression   h/o Cdiff colitis  Obesity  Tobacco dependence    Plan:  - Continue IV abx Merrem, ID following.  - Thoracic sx and pulmonary consult, will repeat chest CT as we do not have access to the actual [place] images  -Ordered tramadol for pain  - Continue home psych meds including chronic ativan    DVT ppx heparin subcu  Full code    Ishmael Lee DO

## 2023-11-25 NOTE — HOSPITAL COURSE
74 yo F with PMHx of CAD, HTN, HLD, abdominal aortic ectasia, COPD, Cdiff colitis, GERD, MADDI, panic disorder, depression, obesity, and tobacco dependence presents as a transfer from [facility] for a thoracic surgery evaluation. She was initially admitted to [facility] on 11/17 after being notified that she had positive blood cultures from a recent ED visit. She ultimately was found to have Pantoea bacteremia and an ESBL Ecoli UTI. She was being followed by ID and has been receiving IV abx Meropenem. In addition, she was being seen by pulmonary for a R sided cavitary lung lesion with a loculated effusion on CT. This has apparently had been seen on a prior CT and when her pulmonologist asked her about further workup she refused (pt denies ever knowing about a lung mass). She denies fever, chills, cough, sob, CP, unintentional weight loss, recent travel (did travel to [place] in May), or sick contacts. She was transferred for thoracic surgery evaluation as this service is not available at [facility].     The information presented above originates from the History and Physical (H&P) or Consultation note, offering a concise overview of the patient's presenting issue and medical condition.    Thoracic surgery and ID consulted.  Continued on IV Abx.  Repeat CT chest.  Repeat cultures obtained.

## 2023-11-25 NOTE — Clinical Note
Core Samples placed in formulin. Fluid aspirated into syringe, 2cc,,  for culture and culture/fungal

## 2023-11-26 LAB
ANION GAP SERPL CALC-SCNC: 16 MMOL/L (ref 10–20)
BUN SERPL-MCNC: 11 MG/DL (ref 6–23)
CALCIUM SERPL-MCNC: 9 MG/DL (ref 8.6–10.3)
CHLORIDE SERPL-SCNC: 100 MMOL/L (ref 98–107)
CO2 SERPL-SCNC: 22 MMOL/L (ref 21–32)
CREAT SERPL-MCNC: 1.03 MG/DL (ref 0.5–1.05)
ERYTHROCYTE [DISTWIDTH] IN BLOOD BY AUTOMATED COUNT: 15.3 % (ref 11.5–14.5)
GFR SERPL CREATININE-BSD FRML MDRD: 57 ML/MIN/1.73M*2
GLUCOSE SERPL-MCNC: 90 MG/DL (ref 74–99)
HCT VFR BLD AUTO: 30.5 % (ref 36–46)
HGB BLD-MCNC: 9.1 G/DL (ref 12–16)
MAGNESIUM SERPL-MCNC: 1.93 MG/DL (ref 1.6–2.4)
MCH RBC QN AUTO: 25.3 PG (ref 26–34)
MCHC RBC AUTO-ENTMCNC: 29.8 G/DL (ref 32–36)
MCV RBC AUTO: 85 FL (ref 80–100)
NRBC BLD-RTO: 0 /100 WBCS (ref 0–0)
PLATELET # BLD AUTO: 318 X10*3/UL (ref 150–450)
POTASSIUM SERPL-SCNC: 4.3 MMOL/L (ref 3.5–5.3)
RBC # BLD AUTO: 3.59 X10*6/UL (ref 4–5.2)
SODIUM SERPL-SCNC: 134 MMOL/L (ref 136–145)
WBC # BLD AUTO: 9 X10*3/UL (ref 4.4–11.3)

## 2023-11-26 PROCEDURE — 1200000002 HC GENERAL ROOM WITH TELEMETRY DAILY

## 2023-11-26 PROCEDURE — 80048 BASIC METABOLIC PNL TOTAL CA: CPT

## 2023-11-26 PROCEDURE — 36415 COLL VENOUS BLD VENIPUNCTURE: CPT

## 2023-11-26 PROCEDURE — 2500000004 HC RX 250 GENERAL PHARMACY W/ HCPCS (ALT 636 FOR OP/ED)

## 2023-11-26 PROCEDURE — 99221 1ST HOSP IP/OBS SF/LOW 40: CPT | Performed by: NURSE PRACTITIONER

## 2023-11-26 PROCEDURE — 83735 ASSAY OF MAGNESIUM: CPT

## 2023-11-26 PROCEDURE — 2500000001 HC RX 250 WO HCPCS SELF ADMINISTERED DRUGS (ALT 637 FOR MEDICARE OP): Performed by: STUDENT IN AN ORGANIZED HEALTH CARE EDUCATION/TRAINING PROGRAM

## 2023-11-26 PROCEDURE — 2500000004 HC RX 250 GENERAL PHARMACY W/ HCPCS (ALT 636 FOR OP/ED): Performed by: STUDENT IN AN ORGANIZED HEALTH CARE EDUCATION/TRAINING PROGRAM

## 2023-11-26 PROCEDURE — 96372 THER/PROPH/DIAG INJ SC/IM: CPT | Performed by: STUDENT IN AN ORGANIZED HEALTH CARE EDUCATION/TRAINING PROGRAM

## 2023-11-26 PROCEDURE — 2500000001 HC RX 250 WO HCPCS SELF ADMINISTERED DRUGS (ALT 637 FOR MEDICARE OP)

## 2023-11-26 PROCEDURE — 99232 SBSQ HOSP IP/OBS MODERATE 35: CPT | Performed by: INTERNAL MEDICINE

## 2023-11-26 PROCEDURE — 85027 COMPLETE CBC AUTOMATED: CPT

## 2023-11-26 RX ORDER — KETOROLAC TROMETHAMINE 30 MG/ML
15 INJECTION, SOLUTION INTRAMUSCULAR; INTRAVENOUS EVERY 12 HOURS PRN
Status: DISCONTINUED | OUTPATIENT
Start: 2023-11-26 | End: 2023-11-28 | Stop reason: HOSPADM

## 2023-11-26 RX ADMIN — ROSUVASTATIN CALCIUM 10 MG: 10 TABLET, FILM COATED ORAL at 21:08

## 2023-11-26 RX ADMIN — MEROPENEM 1 G: 1 INJECTION, POWDER, FOR SOLUTION INTRAVENOUS at 21:15

## 2023-11-26 RX ADMIN — MEROPENEM 1 G: 1 INJECTION, POWDER, FOR SOLUTION INTRAVENOUS at 08:31

## 2023-11-26 RX ADMIN — HEPARIN SODIUM 5000 UNITS: 5000 INJECTION INTRAVENOUS; SUBCUTANEOUS at 02:20

## 2023-11-26 RX ADMIN — HEPARIN SODIUM 5000 UNITS: 5000 INJECTION INTRAVENOUS; SUBCUTANEOUS at 21:15

## 2023-11-26 RX ADMIN — LORAZEPAM 1 MG: 0.5 TABLET ORAL at 21:08

## 2023-11-26 RX ADMIN — HEPARIN SODIUM 5000 UNITS: 5000 INJECTION INTRAVENOUS; SUBCUTANEOUS at 08:33

## 2023-11-26 RX ADMIN — OXYBUTYNIN CHLORIDE 5 MG: 5 TABLET ORAL at 21:08

## 2023-11-26 RX ADMIN — KETOROLAC TROMETHAMINE 15 MG: 30 INJECTION, SOLUTION INTRAMUSCULAR; INTRAVENOUS at 16:09

## 2023-11-26 RX ADMIN — TRAMADOL HYDROCHLORIDE 50 MG: 50 TABLET, COATED ORAL at 08:31

## 2023-11-26 RX ADMIN — OXYBUTYNIN CHLORIDE 5 MG: 5 TABLET ORAL at 08:32

## 2023-11-26 RX ADMIN — CYANOCOBALAMIN TAB 500 MCG 500 MCG: 500 TAB at 08:31

## 2023-11-26 RX ADMIN — LORAZEPAM 1 MG: 0.5 TABLET ORAL at 08:32

## 2023-11-26 RX ADMIN — PANTOPRAZOLE SODIUM 20 MG: 20 TABLET, DELAYED RELEASE ORAL at 02:21

## 2023-11-26 RX ADMIN — TRAMADOL HYDROCHLORIDE 50 MG: 50 TABLET, COATED ORAL at 02:21

## 2023-11-26 RX ADMIN — Medication 5 G: at 08:31

## 2023-11-26 RX ADMIN — BUPROPION HYDROCHLORIDE 300 MG: 150 TABLET, FILM COATED, EXTENDED RELEASE ORAL at 08:31

## 2023-11-26 RX ADMIN — TRAMADOL HYDROCHLORIDE 50 MG: 50 TABLET, COATED ORAL at 21:09

## 2023-11-26 ASSESSMENT — PAIN SCALES - GENERAL
PAINLEVEL_OUTOF10: 6
PAINLEVEL_OUTOF10: 2
PAINLEVEL_OUTOF10: 0 - NO PAIN
PAINLEVEL_OUTOF10: 0 - NO PAIN
PAINLEVEL_OUTOF10: 8

## 2023-11-26 ASSESSMENT — PAIN - FUNCTIONAL ASSESSMENT
PAIN_FUNCTIONAL_ASSESSMENT: 0-10

## 2023-11-26 ASSESSMENT — PAIN DESCRIPTION - ORIENTATION
ORIENTATION: RIGHT
ORIENTATION: RIGHT

## 2023-11-26 ASSESSMENT — PAIN DESCRIPTION - LOCATION
LOCATION: RIB CAGE
LOCATION: RIB CAGE

## 2023-11-26 ASSESSMENT — PAIN DESCRIPTION - DESCRIPTORS: DESCRIPTORS: ACHING

## 2023-11-26 NOTE — CONSULTS
Assessment and Plan  Patient is 73 y.o. female  with the following medical Problems:  Cavitary R lung mass (6.1X5 cm ) thick wall with air fluid level, R sided loculated pleural effusion  ESBL Ecoli UTI  Pantoea bacteremia  COPD without acute exacerbation  CAD, HTN, HLD  Abdominal aortic ectasia  GERD  MADDI, panic disorder, depression   h/o Cdiff colitis  Obesity  Tobacco dependence         Plan of Care:  ID is managing antibiotics  Thoracic surgery consult  I will recommend to proceed with VATs and decortication and lung mass resection.  Supplemental oxygen to keep sat 88 to 94%  DVT prophylaxis  Full PFTs      History of Present Illness:   74 yo F with PMHx of CAD, HTN, HLD, abdominal aortic ectasia, COPD, Cdiff colitis, GERD, MADDI, panic disorder, depression, obesity, and tobacco dependence presents as a transfer from [facility] for a thoracic surgery evaluation. She was initially admitted to [facility] on 11/17 after being notified that she had positive blood cultures from a recent ED visit. She ultimately was found to have Pantoea bacteremia and an ESBL Ecoli UTI. She was being followed by ID and has been receiving IV abx Meropenem. In addition, she was being seen by pulmonary for a R sided cavitary lung lesion with a loculated effusion on CT. This has apparently had been seen on a prior CT and when her pulmonologist asked her about further workup she refused (pt denies ever knowing about a lung mass). She denies fever, chills, cough, sob, CP, unintentional weight loss, recent travel (did travel to [place] in May), or sick contacts. She was transferred for thoracic surgery evaluation as this service is not available at [facility].     PSHx: ventral hernia repair, carpal tunnel release, cholecystectomy, hysterectomy, protcoplasty, colonoscopy, knee arthroscopy, rectal vaginal fistulectomy      Social: 1 ppd since age 11, denies EtOH, and illicit     Fam Hx: CAD, HTN, HLD     Allergies  Nitrofurantoin  monohyd/m-cryst, Codeine, Nitrofurantoin, Penicillin, Penicillin g, Penicillins, and Tetracyclines     Past Medical/Surgical History:   Past Medical History:   Diagnosis Date    Agoraphobia, unspecified 12/06/2016    Agoraphobia    Body mass index (BMI) 33.0-33.9, adult     BMI 33.0-33.9,adult    Chronic sinusitis, unspecified     Recurrent sinus infections    Cutaneous abscess, unspecified 06/01/2016    Abscess    Disruption of external operation (surgical) wound, not elsewhere classified, initial encounter 07/31/2017    Open abdominal incision with drainage    Diverticulitis of intestine, part unspecified, without perforation or abscess without bleeding 08/13/2018    Acute diverticulitis    Encounter for immunization 04/21/2021    Encounter for immunization    Encounter for screening for lipoid disorders 04/26/2016    Screening cholesterol level    Incisional hernia without obstruction or gangrene 09/11/2015    Incisional hernia    Infection following a procedure, other surgical site, initial encounter 04/20/2017    Incisional infection    Local infection of the skin and subcutaneous tissue, unspecified 05/01/2019    Skin infection    Other conditions influencing health status     Complete Colonoscopy    Other specified postprocedural states 05/15/2018    History of incision and drainage    Otitis media, unspecified, left ear 05/19/2014    Otitis media, left    Pain in right knee 02/16/2015    Bilateral knee pain    Personal history of other diseases of the circulatory system     History of hypertension    Personal history of other infectious and parasitic diseases 07/03/2018    History of onychomycosis    Personal history of other infectious and parasitic diseases 10/18/2019    History of Clostridioides difficile colitis    Personal history of other specified conditions 04/20/2017    History of dysuria    Personal history of other specified conditions 02/02/2017    History of nocturia    Personal history of other  specified conditions 10/18/2019    History of diarrhea    Personal history of other specified conditions 12/06/2019    History of dysuria    Personal history of other specified conditions 02/16/2015    History of dyspnea    Personal history of urinary (tract) infections 12/11/2019    History of urinary tract infection    Syncope and collapse 01/05/2018    Collapse    Tinnitus, left ear 08/29/2014    Tinnitus of left ear    Unspecified open wound of abdominal wall, unspecified quadrant without penetration into peritoneal cavity, initial encounter 08/23/2019    Wound, open, abdominal wall, anterior     Past Surgical History:   Procedure Laterality Date    CARPAL TUNNEL RELEASE  05/20/2014    Neuroplasty Decompression Median Nerve At Carpal Tunnel    CHOLECYSTECTOMY  05/20/2014    Cholecystectomy    COLONOSCOPY  01/08/2021    Complete Colonoscopy    HYSTERECTOMY  05/20/2014    Hysterectomy    KNEE ARTHROSCOPY W/ DEBRIDEMENT  02/16/2015    Knee Arthroscopy (Therapeutic)    OTHER SURGICAL HISTORY  08/20/2019    Incisional Hernia Repair    RECTAL VAGINAL FISTULECTOMY  05/20/2014    Rectocele Repair       Family History:   No relevant family history has been documented for this patient.    Allergies:     Allergies   Allergen Reactions    Nitrofurantoin Monohyd/M-Cryst Anaphylaxis    Codeine Unknown    Nitrofurantoin Itching    Penicillin Hives and Other     pt tolerates cephalosporins    Penicillin G Unknown     Tolerated cefepime during 10/2019 admission   Tolerated ceftriaxone 7/2018 admission    Penicillins Rash    Tetracyclines Rash         Social history:   reports that she has been smoking cigarettes. She has a 62.00 pack-year smoking history. She does not have any smokeless tobacco history on file.    Current Medications:   No recently discontinued medications to reconcile    Review of Systems:   General: denies weight gain, denies loss of appetite, fever, chills, night sweats.  HEENT: denies headaches, dizziness,  head trauma, visual changes, eye pain, tinnitus, nosebleeds, hoarseness or throat pain    Respiratory: +ve chest pain, dyspnea, cough but no hemoptysis  Cardiovascular: denies orthopnea, paroxysmal nocturnal dyspnea, leg swelling, and previous heart attack.    Gastrointestinal: denies pain, nausea vomiting, diarrhea, constipation, melena or bleeding.  Genitourinary: denies hematuria, frequency, urgency or dysuria  Neurology: denies syncope, seizures, paralysis, paraesthesia   Endocrine: denies polyuria, polydipsia, skin or hair changes, and heat or cold intolerance  Musculoskeletal: denies joint pain, swelling, arthritis or myalgia  Hematologic: denies bleeding, adenopathy and easy bruising  Skin: denies rashes and skin discoloration  Psychiatry: denies depression    Physical Exam:   Vital Signs:   Vitals:    11/25/23 1656   BP: 146/71   Pulse: 92   Resp: 18   Temp: 36.7 °C (98.1 °F)   SpO2: 93%       Input/Output:    Intake/Output Summary (Last 24 hours) at 11/25/2023 2002  Last data filed at 11/25/2023 1322  Gross per 24 hour   Intake 1621.67 ml   Output 500 ml   Net 1121.67 ml       Oxygen requirements:    Ventilator Information:             General appearance: Not in pain or distress, in no respiratory distress    HEENT: Atraumatic/normocephalic, EOMI, MAICO, pharynx clear, moist mucosa, redness of the uvula appreciated,   Neck: Supple, no jugular venous distension, lymphadenopathy, thyromegaly or carotid bruits  Chest: Decreased breath sounds, no wheezing, no crackles but +ve tenderness over ribs   Cardiovascular: Normal S1, S2, regular rate and rhythm, no murmur, rub or gallop  Abdomen: Normal sounds present, soft, lax with no tenderness, no hepatosplenomegaly, and no masses  Extremities: No edema. Pulses are equally present.   Skin: intact, no rashes   Neurologic: Alert and oriented x 3, No focal deficit     Investigations:  Labs, radiological imaging and cardiac work up were personally  reviewed          .       STAFF PHYSICIAN NOTE OF PERSONAL INVOLVEMENT IN CARE  As the attending physician, I certify that I personally reviewed the patient's history and personally examined the patient to confirm the physical findings described above, and that I reviewed the relevant imaging studies and available reports.  I also discussed the differential diagnosis and all of the proposed management plans with the patient and individuals accompanying the patient to this visit.  They had the opportunity to ask questions about the proposed management plans and to have those questions answered.

## 2023-11-26 NOTE — CONSULTS
Inpatient consult to Thoracic Surgery  Consult performed by: Brennan Hyde, HATTIE-CNP  Consult ordered by: Prudencio Coreas DO  Reason for consult: Right Cavitary Lung Lesion with Pleural Effusion      Thoracic Surgery   Consult Note     History Of Present Illness  Janee Maddox is a 73 y.o. female with a PMH significant for CAD, HTN, HLD, COPD, GERD, C. Diff colitis, obesity (BMI 25) and nicotine use disorder who presents to [facility] on 11/25/2023 as a transfer from [facility] for management of a Right Cavitary Lung Lesion with subsequent pleural effusion.     She initially presented to [facility] on 11/17/23 after finding out blood cultures during a previous ED visit resulted as positive with a Pantoea bacteremia. During her stay at [facility], it was recommended that the patient be transferred to [facility] so she could undergo thoracic surgery evaluation.      Subjective   Past Medical History  She has a past medical history of Agoraphobia, unspecified (12/06/2016), Body mass index (BMI) 33.0-33.9, adult, Chronic sinusitis, unspecified, Cutaneous abscess, unspecified (06/01/2016), Disruption of external operation (surgical) wound, not elsewhere classified, initial encounter (07/31/2017), Diverticulitis of intestine, part unspecified, without perforation or abscess without bleeding (08/13/2018), Encounter for immunization (04/21/2021), Encounter for screening for lipoid disorders (04/26/2016), Incisional hernia without obstruction or gangrene (09/11/2015), Infection following a procedure, other surgical site, initial encounter (04/20/2017), Local infection of the skin and subcutaneous tissue, unspecified (05/01/2019), Other conditions influencing health status, Other specified postprocedural states (05/15/2018), Otitis media, unspecified, left ear (05/19/2014), Pain in right knee (02/16/2015), Personal history of other diseases of the circulatory system,  Personal history of other infectious and parasitic diseases (07/03/2018), Personal history of other infectious and parasitic diseases (10/18/2019), Personal history of other specified conditions (04/20/2017), Personal history of other specified conditions (02/02/2017), Personal history of other specified conditions (10/18/2019), Personal history of other specified conditions (12/06/2019), Personal history of other specified conditions (02/16/2015), Personal history of urinary (tract) infections (12/11/2019), Syncope and collapse (01/05/2018), Tinnitus, left ear (08/29/2014), and Unspecified open wound of abdominal wall, unspecified quadrant without penetration into peritoneal cavity, initial encounter (08/23/2019).    Surgical History  She has a past surgical history that includes Hysterectomy (05/20/2014); Carpal tunnel release (05/20/2014); Cholecystectomy (05/20/2014); Rectal vaginal fistulectomy (05/20/2014); Other surgical history (08/20/2019); Knee arthroscopy w/ debridement (02/16/2015); and Colonoscopy (01/08/2021).     Social History  She reports that she has been smoking cigarettes. She has a 62.00 pack-year smoking history. She does not have any smokeless tobacco history on file. No history on file for alcohol use and drug use.    Family History  Family History   Problem Relation Name Age of Onset    Hypertension Mother      Other (cardiac disorder) Father      Heart attack Maternal Grandfather          Allergies  Nitrofurantoin monohyd/m-cryst, Codeine, Nitrofurantoin, Penicillin, Penicillin g, Penicillins, and Tetracyclines    Review of Systems  Review of Systems   Constitutional: Positive for malaise/fatigue. Negative for decreased appetite.   HENT:  Negative for congestion.    Eyes:  Negative for blurred vision and double vision.   Cardiovascular:  Negative for chest pain, dyspnea on exertion, irregular heartbeat, leg swelling, orthopnea and palpitations.   Respiratory:  Positive for cough. Negative  for shortness of breath.    Endocrine: Negative for cold intolerance and heat intolerance.   Skin:  Negative for nail changes, poor wound healing and rash.   Musculoskeletal:  Positive for myalgias. Negative for arthritis, muscle cramps, muscle weakness and stiffness.   Gastrointestinal:  Negative for bloating, nausea and vomiting.   Genitourinary:  Negative for frequency, hesitancy and urgency.   Neurological:  Negative for dizziness, focal weakness, light-headedness and weakness.   Psychiatric/Behavioral:  Negative for altered mental status.    Allergic/Immunologic: Negative for environmental allergies.           Objective   Physical Exam  Physical Exam  Constitutional:       General: She is not in acute distress.     Appearance: Normal appearance. She is not ill-appearing.   HENT:      Head: Normocephalic and atraumatic.      Nose: Nose normal.      Mouth/Throat:      Mouth: Mucous membranes are moist.      Pharynx: Oropharynx is clear.   Eyes:      Extraocular Movements: Extraocular movements intact.      Conjunctiva/sclera: Conjunctivae normal.      Pupils: Pupils are equal, round, and reactive to light.   Cardiovascular:      Rate and Rhythm: Normal rate and regular rhythm.      Pulses: Normal pulses.      Heart sounds: Normal heart sounds, S1 normal and S2 normal. No murmur heard.     No systolic murmur is present.      No friction rub. No gallop.   Pulmonary:      Effort: Pulmonary effort is normal.      Breath sounds: Normal breath sounds.   Abdominal:      General: Abdomen is flat. Bowel sounds are normal. There is no distension.      Palpations: Abdomen is soft.   Musculoskeletal:         General: Normal range of motion.      Cervical back: Normal range of motion and neck supple.      Right lower leg: No edema.      Left lower leg: No edema.   Skin:     General: Skin is warm and dry.      Capillary Refill: Capillary refill takes less than 2 seconds.      Findings: No lesion.      Nails: There is no  "clubbing.   Neurological:      General: No focal deficit present.      Mental Status: She is alert and oriented to person, place, and time. Mental status is at baseline.      Cranial Nerves: Cranial nerves 2-12 are intact.      Sensory: Sensation is intact.      Motor: Motor function is intact.   Psychiatric:         Attention and Perception: Attention and perception normal.         Mood and Affect: Mood normal.         Speech: Speech normal.         Behavior: Behavior normal.         Thought Content: Thought content normal.         Cognition and Memory: Cognition and memory normal.         Judgment: Judgment normal.        Last Recorded Vitals  /72   Pulse 85   Temp 36.4 °C (97.5 °F) (Temporal)   Resp 18   Wt 60.5 kg (133 lb 6.1 oz)   SpO2 92%     Relevant Results  Last Labs:  CBC - 11/26/2023:  5:42 AM  9.0 9.1 318    30.5      CMP - 11/26/2023:  5:42 AM  9.0 6.3 13 --- 0.5   _ 3.1 13 51      PTT - 11/25/2023:  3:44 AM  1.3   15.0 36     No results found for: \"TROPHS\", \"BNP\", \"HGBA1C\", \"LDLCALC\", \"VLDL\"   Last I/O:  I/O last 3 completed shifts:  In: 2121.7 (35.1 mL/kg) [P.O.:900; I.V.:521.7 (8.6 mL/kg); IV Piggyback:700]  Out: 500 (8.3 mL/kg) [Urine:500 (0.2 mL/kg/hr)]  Weight: 60.5 kg            Assessment & Plan       Right Cavitary Lung Lesion   - Right lowe lobe cavitary lesion noted at OSH and confirmed on 11/25/23 CT chest   - Lesion appears primarily infectious in nature  - Would recommend biopsy and eventual PET for malignancy workup (can be done as OP) while continuing to treat primarily infectious origin   - Can follow up with thoracic surgery in the OP setting        Right Pleural Effusion   - Minimal effusion that isnt contributing to significant respiratory failure  - Would recommend observation for now     The above patient, imaging and plan discussed with thoracic surgeon, Dr. Myron Thomas. Effusion appears to be infectious in nature; given it's small nature, a chest tube or surgical " treatment with a VATS decortication is not needed at this time.     Would recommend further care per pulmonary, ID and primary.     Brennan Hyde, APRN-CNP

## 2023-11-26 NOTE — PROGRESS NOTES
Janee Maddox is a 73 y.o. female on day 1 of admission presenting with Cavitary lesion of lung.      Subjective   Patient was seen at bedside today, reports feeling about the same as yesterday.  She denies any chest pain, shortness of breath, abdominal pain, chills, fevers, dysuria    Objective     Last Recorded Vitals  /72   Pulse 85   Temp 36.4 °C (97.5 °F) (Temporal)   Resp 18   Wt 60.5 kg (133 lb 6.1 oz)   SpO2 92%   Intake/Output last 3 Shifts:    Intake/Output Summary (Last 24 hours) at 11/26/2023 1023  Last data filed at 11/25/2023 2101  Gross per 24 hour   Intake 1021.67 ml   Output --   Net 1021.67 ml         Admission Weight  Weight: 59.9 kg (132 lb) (11/25/23 0130)    Daily Weight  11/25/23 : 60.5 kg (133 lb 6.1 oz)    Physical Exam    General:  Pleasant and cooperative. No apparent distress.  HEENT:  Normocephalic, atraumatic, mucus membranes moist.   Neck:  Trachea midline.  No JVD.    Chest:  Clear to auscultation bilaterally. No wheezes, rales, or rhonchi.  CV:  Regular rate and rhythm.  Positive S1/S2.   Abdomen: Bowel sounds present in all four quadrants, abdomen is soft, non-tender, non-distended.  Extremities:  No lower extremity edema or cyanosis.   Neurological:  AAOx3. No focal deficits.  Skin:  Warm and dry.      Assessment/Plan   Principal Problem:    Cavitary lesion of lung    Cavitary R lung mass, R sided loculated pleural effusion  ESBL Ecoli UTI  Pantoea bacteremia    COPD  CAD, HTN, HLD  Abdominal aortic ectasia  GERD  MADDI, panic disorder, depression   h/o Cdiff colitis  Obesity  Tobacco dependence    Plan:  - Continue IV abx Merrem, ID following.  - Thoracic sx and pulmonary consult, pulm recommends proceeding with VATS decortication and lung mass resection.  -Ordered tramadol for pain, Toradol as needed  - Continue home psych meds including chronic ativan    DVT ppx heparin subcu  Full code    Ishmael Lee DO

## 2023-11-27 ENCOUNTER — APPOINTMENT (OUTPATIENT)
Dept: RADIOLOGY | Facility: HOSPITAL | Age: 73
DRG: 180 | End: 2023-11-27
Payer: MEDICARE

## 2023-11-27 LAB
ANION GAP SERPL CALC-SCNC: 12 MMOL/L (ref 10–20)
BUN SERPL-MCNC: 12 MG/DL (ref 6–23)
CALCIUM SERPL-MCNC: 9 MG/DL (ref 8.6–10.3)
CHLORIDE SERPL-SCNC: 101 MMOL/L (ref 98–107)
CO2 SERPL-SCNC: 27 MMOL/L (ref 21–32)
CREAT SERPL-MCNC: 1.03 MG/DL (ref 0.5–1.05)
ERYTHROCYTE [DISTWIDTH] IN BLOOD BY AUTOMATED COUNT: 15 % (ref 11.5–14.5)
GFR SERPL CREATININE-BSD FRML MDRD: 57 ML/MIN/1.73M*2
GLUCOSE SERPL-MCNC: 105 MG/DL (ref 74–99)
HCT VFR BLD AUTO: 27.8 % (ref 36–46)
HGB BLD-MCNC: 8.8 G/DL (ref 12–16)
MCH RBC QN AUTO: 25.8 PG (ref 26–34)
MCHC RBC AUTO-ENTMCNC: 31.7 G/DL (ref 32–36)
MCV RBC AUTO: 82 FL (ref 80–100)
NRBC BLD-RTO: 0 /100 WBCS (ref 0–0)
PLATELET # BLD AUTO: 277 X10*3/UL (ref 150–450)
POTASSIUM SERPL-SCNC: 4 MMOL/L (ref 3.5–5.3)
RBC # BLD AUTO: 3.41 X10*6/UL (ref 4–5.2)
SODIUM SERPL-SCNC: 136 MMOL/L (ref 136–145)
WBC # BLD AUTO: 7.7 X10*3/UL (ref 4.4–11.3)

## 2023-11-27 PROCEDURE — 87186 SC STD MICRODIL/AGAR DIL: CPT | Mod: PARLAB | Performed by: INTERNAL MEDICINE

## 2023-11-27 PROCEDURE — 88341 IMHCHEM/IMCYTCHM EA ADD ANTB: CPT | Performed by: STUDENT IN AN ORGANIZED HEALTH CARE EDUCATION/TRAINING PROGRAM

## 2023-11-27 PROCEDURE — 2500000001 HC RX 250 WO HCPCS SELF ADMINISTERED DRUGS (ALT 637 FOR MEDICARE OP): Performed by: INTERNAL MEDICINE

## 2023-11-27 PROCEDURE — 1200000002 HC GENERAL ROOM WITH TELEMETRY DAILY

## 2023-11-27 PROCEDURE — 0BBF3ZX EXCISION OF RIGHT LOWER LUNG LOBE, PERCUTANEOUS APPROACH, DIAGNOSTIC: ICD-10-PCS | Performed by: RADIOLOGY

## 2023-11-27 PROCEDURE — 2500000004 HC RX 250 GENERAL PHARMACY W/ HCPCS (ALT 636 FOR OP/ED)

## 2023-11-27 PROCEDURE — 88381 MICRODISSECTION MANUAL: CPT | Performed by: INTERNAL MEDICINE

## 2023-11-27 PROCEDURE — 71046 X-RAY EXAM CHEST 2 VIEWS: CPT | Performed by: STUDENT IN AN ORGANIZED HEALTH CARE EDUCATION/TRAINING PROGRAM

## 2023-11-27 PROCEDURE — 32408 CORE NDL BX LNG/MED PERQ: CPT | Performed by: RADIOLOGY

## 2023-11-27 PROCEDURE — 32408 CORE NDL BX LNG/MED PERQ: CPT

## 2023-11-27 PROCEDURE — 2720000007 HC OR 272 NO HCPCS

## 2023-11-27 PROCEDURE — 82374 ASSAY BLOOD CARBON DIOXIDE: CPT

## 2023-11-27 PROCEDURE — 87205 SMEAR GRAM STAIN: CPT | Mod: PARLAB | Performed by: INTERNAL MEDICINE

## 2023-11-27 PROCEDURE — 96372 THER/PROPH/DIAG INJ SC/IM: CPT | Performed by: STUDENT IN AN ORGANIZED HEALTH CARE EDUCATION/TRAINING PROGRAM

## 2023-11-27 PROCEDURE — 88342 IMHCHEM/IMCYTCHM 1ST ANTB: CPT | Mod: TC,PARLAB | Performed by: INTERNAL MEDICINE

## 2023-11-27 PROCEDURE — 2500000004 HC RX 250 GENERAL PHARMACY W/ HCPCS (ALT 636 FOR OP/ED): Performed by: RADIOLOGY

## 2023-11-27 PROCEDURE — 81445 SO NEO GSAP 5-50DNA/DNA&RNA: CPT | Performed by: INTERNAL MEDICINE

## 2023-11-27 PROCEDURE — 88305 TISSUE EXAM BY PATHOLOGIST: CPT | Mod: TC,SUR,PARLAB | Performed by: INTERNAL MEDICINE

## 2023-11-27 PROCEDURE — C1729 CATH, DRAINAGE: HCPCS

## 2023-11-27 PROCEDURE — 88342 IMHCHEM/IMCYTCHM 1ST ANTB: CPT | Performed by: STUDENT IN AN ORGANIZED HEALTH CARE EDUCATION/TRAINING PROGRAM

## 2023-11-27 PROCEDURE — 88381 MICRODISSECTION MANUAL: CPT | Performed by: STUDENT IN AN ORGANIZED HEALTH CARE EDUCATION/TRAINING PROGRAM

## 2023-11-27 PROCEDURE — 36415 COLL VENOUS BLD VENIPUNCTURE: CPT

## 2023-11-27 PROCEDURE — 88305 TISSUE EXAM BY PATHOLOGIST: CPT | Performed by: STUDENT IN AN ORGANIZED HEALTH CARE EDUCATION/TRAINING PROGRAM

## 2023-11-27 PROCEDURE — 99232 SBSQ HOSP IP/OBS MODERATE 35: CPT | Performed by: INTERNAL MEDICINE

## 2023-11-27 PROCEDURE — 85027 COMPLETE CBC AUTOMATED: CPT

## 2023-11-27 PROCEDURE — 2500000004 HC RX 250 GENERAL PHARMACY W/ HCPCS (ALT 636 FOR OP/ED): Performed by: STUDENT IN AN ORGANIZED HEALTH CARE EDUCATION/TRAINING PROGRAM

## 2023-11-27 PROCEDURE — 88360 TUMOR IMMUNOHISTOCHEM/MANUAL: CPT | Performed by: STUDENT IN AN ORGANIZED HEALTH CARE EDUCATION/TRAINING PROGRAM

## 2023-11-27 PROCEDURE — 2500000001 HC RX 250 WO HCPCS SELF ADMINISTERED DRUGS (ALT 637 FOR MEDICARE OP): Performed by: STUDENT IN AN ORGANIZED HEALTH CARE EDUCATION/TRAINING PROGRAM

## 2023-11-27 PROCEDURE — 87102 FUNGUS ISOLATION CULTURE: CPT | Mod: PARLAB | Performed by: INTERNAL MEDICINE

## 2023-11-27 PROCEDURE — 2500000005 HC RX 250 GENERAL PHARMACY W/O HCPCS: Performed by: RADIOLOGY

## 2023-11-27 PROCEDURE — 10160 PNXR ASPIR ABSC HMTMA BULLA: CPT

## 2023-11-27 PROCEDURE — 71046 X-RAY EXAM CHEST 2 VIEWS: CPT

## 2023-11-27 PROCEDURE — 2500000001 HC RX 250 WO HCPCS SELF ADMINISTERED DRUGS (ALT 637 FOR MEDICARE OP)

## 2023-11-27 PROCEDURE — G0452 MOLECULAR PATHOLOGY INTERPR: HCPCS | Performed by: INTERNAL MEDICINE

## 2023-11-27 RX ORDER — CIPROFLOXACIN 500 MG/1
500 TABLET ORAL EVERY 12 HOURS SCHEDULED
Status: DISCONTINUED | OUTPATIENT
Start: 2023-11-27 | End: 2023-11-28 | Stop reason: HOSPADM

## 2023-11-27 RX ORDER — CIPROFLOXACIN 500 MG/1
250 TABLET ORAL EVERY 12 HOURS SCHEDULED
Status: DISCONTINUED | OUTPATIENT
Start: 2023-11-27 | End: 2023-11-27

## 2023-11-27 RX ORDER — SODIUM CHLORIDE 9 MG/ML
INJECTION, SOLUTION INTRAVENOUS CONTINUOUS PRN
Status: COMPLETED | OUTPATIENT
Start: 2023-11-27 | End: 2023-11-27

## 2023-11-27 RX ADMIN — TRAMADOL HYDROCHLORIDE 50 MG: 50 TABLET, COATED ORAL at 04:52

## 2023-11-27 RX ADMIN — CIPROFLOXACIN 250 MG: 500 TABLET, FILM COATED ORAL at 10:50

## 2023-11-27 RX ADMIN — KETOROLAC TROMETHAMINE 15 MG: 30 INJECTION, SOLUTION INTRAMUSCULAR; INTRAVENOUS at 05:04

## 2023-11-27 RX ADMIN — PANTOPRAZOLE SODIUM 20 MG: 20 TABLET, DELAYED RELEASE ORAL at 04:53

## 2023-11-27 RX ADMIN — LORAZEPAM 1 MG: 0.5 TABLET ORAL at 21:08

## 2023-11-27 RX ADMIN — SODIUM CHLORIDE 50 ML/HR: 9 INJECTION, SOLUTION INTRAVENOUS at 13:55

## 2023-11-27 RX ADMIN — CYANOCOBALAMIN TAB 500 MCG 500 MCG: 500 TAB at 09:29

## 2023-11-27 RX ADMIN — BUPROPION HYDROCHLORIDE 300 MG: 150 TABLET, FILM COATED, EXTENDED RELEASE ORAL at 09:29

## 2023-11-27 RX ADMIN — ROSUVASTATIN CALCIUM 10 MG: 10 TABLET, FILM COATED ORAL at 21:08

## 2023-11-27 RX ADMIN — TRAMADOL HYDROCHLORIDE 50 MG: 50 TABLET, COATED ORAL at 12:09

## 2023-11-27 RX ADMIN — HEPARIN SODIUM 5000 UNITS: 5000 INJECTION INTRAVENOUS; SUBCUTANEOUS at 04:52

## 2023-11-27 RX ADMIN — MEROPENEM 1 G: 1 INJECTION, POWDER, FOR SOLUTION INTRAVENOUS at 09:27

## 2023-11-27 RX ADMIN — LORAZEPAM 1 MG: 0.5 TABLET ORAL at 09:29

## 2023-11-27 RX ADMIN — Medication 5 G: at 09:29

## 2023-11-27 RX ADMIN — HEPARIN SODIUM 5000 UNITS: 5000 INJECTION INTRAVENOUS; SUBCUTANEOUS at 21:08

## 2023-11-27 RX ADMIN — OXYBUTYNIN CHLORIDE 5 MG: 5 TABLET ORAL at 09:29

## 2023-11-27 RX ADMIN — OXYBUTYNIN CHLORIDE 5 MG: 5 TABLET ORAL at 21:08

## 2023-11-27 RX ADMIN — Medication: at 13:41

## 2023-11-27 RX ADMIN — KETOROLAC TROMETHAMINE 15 MG: 30 INJECTION, SOLUTION INTRAMUSCULAR; INTRAVENOUS at 16:44

## 2023-11-27 ASSESSMENT — ENCOUNTER SYMPTOMS
COUGH: 1
BLURRED VISION: 0
STIFFNESS: 0
FREQUENCY: 0
ORTHOPNEA: 0
MYALGIAS: 1
PALPITATIONS: 0
LIGHT-HEADEDNESS: 0
WEAKNESS: 0
DYSPNEA ON EXERTION: 0
POOR WOUND HEALING: 0
FOCAL WEAKNESS: 0
NAUSEA: 0
ALTERED MENTAL STATUS: 0
VOMITING: 0
IRREGULAR HEARTBEAT: 0
DIZZINESS: 0
SHORTNESS OF BREATH: 0
DOUBLE VISION: 0
DECREASED APPETITE: 0
MUSCLE CRAMPS: 0
BLOATING: 0
NAIL CHANGES: 0

## 2023-11-27 ASSESSMENT — PAIN SCALES - GENERAL
PAINLEVEL_OUTOF10: 7
PAINLEVEL_OUTOF10: 0 - NO PAIN
PAINLEVEL_OUTOF10: 6
PAINLEVEL_OUTOF10: 8
PAINLEVEL_OUTOF10: 0 - NO PAIN
PAINLEVEL_OUTOF10: 0 - NO PAIN
PAINLEVEL_OUTOF10: 8
PAINLEVEL_OUTOF10: 0 - NO PAIN
PAINLEVEL_OUTOF10: 2
PAINLEVEL_OUTOF10: 0 - NO PAIN
PAINLEVEL_OUTOF10: 2

## 2023-11-27 ASSESSMENT — COGNITIVE AND FUNCTIONAL STATUS - GENERAL
DAILY ACTIVITIY SCORE: 24
MOBILITY SCORE: 24
MOBILITY SCORE: 24
DAILY ACTIVITIY SCORE: 24

## 2023-11-27 ASSESSMENT — PAIN - FUNCTIONAL ASSESSMENT
PAIN_FUNCTIONAL_ASSESSMENT: 0-10

## 2023-11-27 NOTE — PROGRESS NOTES
"Infectious disease progress note  Subjective   Right lung mass/abscess chronic for at least a year being followed at [facility]  Right loculated effusion  Positive blood cultures Pantoea 11- at [facility]  UTI ESBL E. coli 11- at [facility]    Antibiotics  Meropenem day 12 out of 14 days  Change to Cipro 250 p.o. twice daily for 10 more days    Objective   Range of Vitals (last 24 hours)  Heart Rate:  [78-98]   Temp:  [35.8 °C (96.4 °F)-36.3 °C (97.3 °F)]   Resp:  [16-18]   BP: (118-139)/(61-73)   SpO2:  [91 %]   Daily Weight  11/25/23 : 60.5 kg (133 lb 6.1 oz)    Body mass index is 25.2 kg/m².      Physical Exam    HEENT PERRLA  Chest [place] bilaterally  CVS S1-S2 regular  Abdomen soft obese nontender positive bowel sounds  Extremities status post bilaterally no pitting edema    Relevant Results  Labs  Lab Results   Component Value Date    WBC 7.7 11/27/2023    HGB 8.8 (L) 11/27/2023    HCT 27.8 (L) 11/27/2023    MCV 82 11/27/2023     11/27/2023     Lab Results   Component Value Date    GLUCOSE 105 (H) 11/27/2023    CALCIUM 9.0 11/27/2023     11/27/2023    K 4.0 11/27/2023    CO2 27 11/27/2023     11/27/2023    BUN 12 11/27/2023    CREATININE 1.03 11/27/2023   ESR: --No results found for: \"SEDRATE\"No results found for: \"CRP\"  Lab Results   Component Value Date    ALT 13 11/25/2023    AST 13 11/25/2023    ALKPHOS 51 11/25/2023    BILITOT 0.5 11/25/2023       Microbiology  11- positive blood cultures Jorge at [facility]  11- UTI with ESBL E. coli  11- blood cultures no growth 1 day    Imaging  11- chest CT shows right lower lobe cavitary mass 6.1 x 5.0 cm.  The CT scan was reviewed with pulmonary consultant in detail.    Assessment/Plan   1.  Will change meropenem over to Cipro 250 p.o. twice daily to finish off 2 more days  2.  Had a long detailed conversation with pulmonary attending and " primary care attending; the patient apparently had known about this at [facility] for years and did not realize the severity of this.  She did not follow-up accordingly.  Concern is whether this is a malignancy or possibly an abscess.  We will place an IR consultation to see if the pleural fluid can be tapped and if this ends up being an abscess she will need to stay get a PICC line and get long-term IV antibiotics at least 4 weeks along with a biopsy of this mass which probably will need to be done at  main [place] by an interventional pulmonologist.    Other issues;  Agoraphobia,   diverticulitis without perforation results   hyperlipidemia chronic  COPD chronic  CAD chronic  Hypertension chronic  Hyperlipidemia chronic    I reviewed and interpreted all lab test imaging studies and documentations from other healthcare providers  I am monitoring antibiotics for side effects and toxicity    I reviewed and interpreted all lab test imaging studies and documentations from other healthcare providers  I am monitoring for antibiotic side effects and toxicity     Edda Tobin MD

## 2023-11-27 NOTE — PROGRESS NOTES
Janee Maddox is a 73 y.o. female on day 2 of admission presenting with Cavitary lesion of lung.      Subjective   Patient was seen at bedside today, reports feeling better today.  Patient states she would like to be home by Thursday.  She denies any chest pain, shortness of breath, abdominal pain, chills, fevers, dysuria    Objective     Last Recorded Vitals  /73   Pulse 83   Temp 35.8 °C (96.4 °F) (Temporal)   Resp 18   Wt 60.5 kg (133 lb 6.1 oz)   SpO2 91%   Intake/Output last 3 Shifts:    Intake/Output Summary (Last 24 hours) at 11/27/2023 1334  Last data filed at 11/27/2023 0957  Gross per 24 hour   Intake 300 ml   Output --   Net 300 ml         Admission Weight  Weight: 59.9 kg (132 lb) (11/25/23 0130)    Daily Weight  11/25/23 : 60.5 kg (133 lb 6.1 oz)    Physical Exam    General:  Pleasant and cooperative. No apparent distress.  HEENT:  Normocephalic, atraumatic, mucus membranes moist.   Neck:  Trachea midline.  No JVD.    Chest:  Clear to auscultation bilaterally. No wheezes, rales, or rhonchi.  CV:  Regular rate and rhythm.  Positive S1/S2.   Abdomen: Bowel sounds present in all four quadrants, abdomen is soft, non-tender, non-distended.  Extremities:  No lower extremity edema or cyanosis.   Neurological:  AAOx3. No focal deficits.  Skin:  Warm and dry.      Assessment/Plan   Principal Problem:    Cavitary lesion of lung    Cavitary R lung mass, R sided loculated pleural effusion  ESBL Ecoli UTI  Pantoea bacteremia    COPD  CAD, HTN, HLD  Abdominal aortic ectasia  GERD  MADDI, panic disorder, depression   h/o Cdiff colitis  Obesity  Tobacco dependence    Plan:  -Meropenem discontinued 11/27, oral ciprofloxacin started, ID following.  - Thoracic sx and pulmonary consult, thoracic surgery recommended interventional pulmonology outpatient follow-up for possible bronc and biopsy/drainage.    -Discussed with interventional radiology, will attempt biopsy/drainage today.  -Ordered tramadol for pain,  Toradol as needed  -Continue home psych meds including chronic ativan    DVT ppx heparin subcu  Full code    Ishmael Lee DO

## 2023-11-27 NOTE — CARE PLAN
The patient's goals for the shift include  pain reduction to 1/10    The clinical goals for the shift include pain management

## 2023-11-27 NOTE — PROGRESS NOTES
Medication Adjustment    The following medication(s) was/were adjusted for Janee TINO Maddox per protocol/policy due to altered renal function.    Medication(s) adjusted:   Ciprofloxacin 500mg q12h to 250mg q12h    Curly Coburn RP

## 2023-11-27 NOTE — PROGRESS NOTES
11/27/23 1416   Discharge Planning   Living Arrangements Alone   Support Systems Children;Friends/neighbors   Type of Residence Private residence   Who is requesting discharge planning? Provider     Met with pt, pt admit from [facility] for Cardio-thoracic consult.  Pt has a rt lung cavitary lesion.  Plan is for bx by IR and tentatively home soon , will follow if home needs arise for treatment of complication UTI .  Home address and insurance verified.   Ev Milligan RN TCC

## 2023-11-27 NOTE — PROGRESS NOTES
Janee Maddox is a 73 y.o. female on day 2 of admission presenting with Cavitary lesion of lung.      Assessment / Plan        Patient is 73 y.o. female  with the following medical Problems:  Cavitary R lung mass (6.1X5 cm ) thick wall with air fluid level, R sided loculated pleural effusion  ESBL Ecoli UTI  Pantoea bacteremia  COPD without acute exacerbation  CAD, HTN, HLD  Abdominal aortic ectasia  GERD  MADDI, panic disorder, depression   h/o Cdiff colitis  Obesity  Tobacco dependence     Plan of Care:  ID is managing antibiotics  Thoracic surgery consulted, stated they would not be able to do surgery and patient.  Recommended outpatient follow-up with interventional pulmonology for possible bronc.  Primary team will discuss with IR to see if they are able to collect biopsy inpatient.  Mass is believed to have been present for roughly a year making it more likely to be cancerous in etiology  Supplemental oxygen to keep sat 88 to 94%  DVT prophylaxis  Full PFTs       Marvin Mendoza MD   PGY-1, Internal Medicine  This is a preliminary note, please await attending attestation for final A/P    Subjective     Patient seen and examined. No acute overnight events.  Patient endorses occasional cough and pain around the lateral breast area.  Denies fevers, chills, or fatigue.  Discussion was had regarding patient's previous hospitalization and she was told that she had never been informed regarding the mass on the CT from a year ago.  Patient has active smoking history.      Objective       Physical Exam:  General:  Pleasant and cooperative. No apparent distress.  HEENT:  Normocephalic, atraumatic  Chest:  Clear to auscultation bilaterally. No wheezes, rales, or rhonchi.  CV:  Regular rate and rhythm. No murmurs    Abdomen: Abdomen is soft, non-tender, non-distended. BS +   Extremities:  No lower extremity edema or cyanosis.   Neurological:  AAOx3. No focal deficits.  Skin:  Warm and dry.    Last Recorded Vitals  Blood  "pressure 135/73, pulse 83, temperature 35.8 °C (96.4 °F), temperature source Temporal, resp. rate 18, height 1.549 m (5' 1\"), weight 60.5 kg (133 lb 6.1 oz), SpO2 91 %.  Intake/Output last 3 Shifts:  I/O last 3 completed shifts:  In: 700 (11.6 mL/kg) [P.O.:400; IV Piggyback:300]  Out: - (0 mL/kg)   Weight: 60.5 kg     Last CBC & BMP  Lab Results   Component Value Date    GLUCOSE 105 (H) 11/27/2023    CALCIUM 9.0 11/27/2023     11/27/2023    K 4.0 11/27/2023    CO2 27 11/27/2023     11/27/2023    BUN 12 11/27/2023    CREATININE 1.03 11/27/2023     Lab Results   Component Value Date    WBC 7.7 11/27/2023    HGB 8.8 (L) 11/27/2023    HCT 27.8 (L) 11/27/2023    MCV 82 11/27/2023     11/27/2023           "

## 2023-11-27 NOTE — PROCEDURES
Interventional Radiology Brief Postprocedure Note    Attending: Sharee Fink MD    Assistant:   Staff Role   Twan Link, RN Radiology Nurse   Janice Mendez, RN Radiology Nurse   Sharee Fink MD Radiologist       Diagnosis:   1. Cavitary lesion of lung            Description of procedure: CT guided percutaneous biopsy lung right lower lobe lung cavitary mass and aspiration of fluid contents. Sent to path and culture.     Timeout:  Yes    Procedure Area: Procedure Area     Anesthesia:   Local/Topical    Complications: None    Estimated Blood Loss: none    Medications (Filter: Administrations occurring from 1419 to 1419 on 11/27/23) As of 11/27/23 1419      None          No specimens collected      See detailed result report with images in PACS.    The patient tolerated the procedure well without incident or complication and is in stable condition.

## 2023-11-27 NOTE — PRE-PROCEDURE NOTE
Interventional Radiology Preprocedure Note    Indication for procedure: The encounter diagnosis was Cavitary lesion of lung.    Relevant review of systems: NA    Relevant Labs:   Lab Results   Component Value Date    CREATININE 1.03 11/27/2023    EGFR 57 (L) 11/27/2023    INR 1.3 (H) 11/25/2023    PROTIME 15.0 (H) 11/25/2023       Planned Sedation/Anesthesia: Minimal    Airway assessment: normal    Directed physical examination:    Aox3  No increased work of breathing.   No acute distress      Mallampati: II (hard and soft palate, upper portion of tonsils anduvula visible)    ASA Score: ASA 2 - Patient with mild systemic disease with no functional limitations    Benefits, risks and alternatives of procedure and planned sedation have been discussed with the patient and/or their representative. All questions answered and they agree to proceed.

## 2023-11-27 NOTE — CARE PLAN
Problem: Pain - Adult  Goal: Verbalizes/displays adequate comfort level or baseline comfort level  Outcome: Progressing     Problem: Safety - Adult  Goal: Free from fall injury  Outcome: Progressing     Problem: Discharge Planning  Goal: Discharge to home or other facility with appropriate resources  Outcome: Progressing     Problem: Chronic Conditions and Co-morbidities  Goal: Patient's chronic conditions and co-morbidity symptoms are monitored and maintained or improved  Outcome: Progressing     Problem: Pain  Goal: Takes deep breaths with improved pain control throughout the shift  Outcome: Progressing  Goal: Turns in bed with improved pain control throughout the shift  Outcome: Progressing  Goal: Walks with improved pain control throughout the shift  Outcome: Progressing  Goal: Performs ADL's with improved pain control throughout shift  Outcome: Progressing  Goal: Participates in PT with improved pain control throughout the shift  Outcome: Progressing  Goal: Free from opioid side effects throughout the shift  Outcome: Progressing  Goal: Free from acute confusion related to pain meds throughout the shift  Outcome: Progressing   The patient's goals for the shift include      The clinical goals for the shift include pain management    Over the shift, the patient did not make progress toward the following goals. Barriers to progression include none. Recommendations to address these barriers include n/a.

## 2023-11-27 NOTE — PROGRESS NOTES
Medication Adjustment    The following medication(s) was/were adjusted for Janee Maddox per protocol/policy due to indication for the medication .    Medication(s) adjusted:   Ciprofloxacin 250mg q12h to 500mg q12h    Curly Coburn RP

## 2023-11-28 VITALS
DIASTOLIC BLOOD PRESSURE: 72 MMHG | WEIGHT: 133.38 LBS | RESPIRATION RATE: 18 BRPM | OXYGEN SATURATION: 90 % | HEART RATE: 84 BPM | BODY MASS INDEX: 25.18 KG/M2 | SYSTOLIC BLOOD PRESSURE: 135 MMHG | HEIGHT: 61 IN | TEMPERATURE: 97.7 F

## 2023-11-28 PROBLEM — J98.4 CAVITARY LESION OF LUNG: Status: RESOLVED | Noted: 2023-11-25 | Resolved: 2023-11-28

## 2023-11-28 LAB
ANION GAP SERPL CALC-SCNC: 14 MMOL/L (ref 10–20)
BUN SERPL-MCNC: 10 MG/DL (ref 6–23)
CALCIUM SERPL-MCNC: 9.3 MG/DL (ref 8.6–10.3)
CHLORIDE SERPL-SCNC: 98 MMOL/L (ref 98–107)
CO2 SERPL-SCNC: 26 MMOL/L (ref 21–32)
CREAT SERPL-MCNC: 1.07 MG/DL (ref 0.5–1.05)
ERYTHROCYTE [DISTWIDTH] IN BLOOD BY AUTOMATED COUNT: 15 % (ref 11.5–14.5)
GFR SERPL CREATININE-BSD FRML MDRD: 54 ML/MIN/1.73M*2
GLUCOSE SERPL-MCNC: 92 MG/DL (ref 74–99)
HCT VFR BLD AUTO: 28.1 % (ref 36–46)
HGB BLD-MCNC: 8.8 G/DL (ref 12–16)
LDH SERPL L TO P-CCNC: 131 U/L (ref 84–246)
MCH RBC QN AUTO: 25.7 PG (ref 26–34)
MCHC RBC AUTO-ENTMCNC: 31.3 G/DL (ref 32–36)
MCV RBC AUTO: 82 FL (ref 80–100)
NRBC BLD-RTO: 0 /100 WBCS (ref 0–0)
PLATELET # BLD AUTO: 299 X10*3/UL (ref 150–450)
POTASSIUM SERPL-SCNC: 3.9 MMOL/L (ref 3.5–5.3)
PROT SERPL-MCNC: 6.2 G/DL (ref 6.4–8.2)
RBC # BLD AUTO: 3.43 X10*6/UL (ref 4–5.2)
SODIUM SERPL-SCNC: 134 MMOL/L (ref 136–145)
WBC # BLD AUTO: 7.9 X10*3/UL (ref 4.4–11.3)

## 2023-11-28 PROCEDURE — 36415 COLL VENOUS BLD VENIPUNCTURE: CPT

## 2023-11-28 PROCEDURE — 2500000001 HC RX 250 WO HCPCS SELF ADMINISTERED DRUGS (ALT 637 FOR MEDICARE OP)

## 2023-11-28 PROCEDURE — 84155 ASSAY OF PROTEIN SERUM: CPT

## 2023-11-28 PROCEDURE — 2500000004 HC RX 250 GENERAL PHARMACY W/ HCPCS (ALT 636 FOR OP/ED): Performed by: STUDENT IN AN ORGANIZED HEALTH CARE EDUCATION/TRAINING PROGRAM

## 2023-11-28 PROCEDURE — 83615 LACTATE (LD) (LDH) ENZYME: CPT

## 2023-11-28 PROCEDURE — 2500000001 HC RX 250 WO HCPCS SELF ADMINISTERED DRUGS (ALT 637 FOR MEDICARE OP): Performed by: STUDENT IN AN ORGANIZED HEALTH CARE EDUCATION/TRAINING PROGRAM

## 2023-11-28 PROCEDURE — 99238 HOSP IP/OBS DSCHRG MGMT 30/<: CPT

## 2023-11-28 PROCEDURE — 80048 BASIC METABOLIC PNL TOTAL CA: CPT

## 2023-11-28 PROCEDURE — 2500000004 HC RX 250 GENERAL PHARMACY W/ HCPCS (ALT 636 FOR OP/ED)

## 2023-11-28 PROCEDURE — 85027 COMPLETE CBC AUTOMATED: CPT

## 2023-11-28 PROCEDURE — 2500000001 HC RX 250 WO HCPCS SELF ADMINISTERED DRUGS (ALT 637 FOR MEDICARE OP): Performed by: INTERNAL MEDICINE

## 2023-11-28 PROCEDURE — 96372 THER/PROPH/DIAG INJ SC/IM: CPT | Performed by: STUDENT IN AN ORGANIZED HEALTH CARE EDUCATION/TRAINING PROGRAM

## 2023-11-28 RX ORDER — CIPROFLOXACIN 250 MG/1
250 TABLET, FILM COATED ORAL 2 TIMES DAILY
Qty: 2 TABLET | Refills: 0 | Status: SHIPPED | OUTPATIENT
Start: 2023-11-28 | End: 2023-11-28 | Stop reason: SDUPTHER

## 2023-11-28 RX ORDER — TRAMADOL HYDROCHLORIDE 50 MG/1
50 TABLET ORAL EVERY 6 HOURS PRN
Qty: 20 TABLET | Refills: 0 | Status: SHIPPED | OUTPATIENT
Start: 2023-11-28 | End: 2023-12-05 | Stop reason: ALTCHOICE

## 2023-11-28 RX ORDER — CIPROFLOXACIN 250 MG/1
250 TABLET, FILM COATED ORAL 2 TIMES DAILY
Qty: 2 TABLET | Refills: 0 | Status: SHIPPED | OUTPATIENT
Start: 2023-11-28 | End: 2023-11-29

## 2023-11-28 RX ADMIN — CYANOCOBALAMIN TAB 500 MCG 500 MCG: 500 TAB at 08:55

## 2023-11-28 RX ADMIN — CIPROFLOXACIN 500 MG: 500 TABLET, FILM COATED ORAL at 08:55

## 2023-11-28 RX ADMIN — KETOROLAC TROMETHAMINE 15 MG: 30 INJECTION, SOLUTION INTRAMUSCULAR; INTRAVENOUS at 04:50

## 2023-11-28 RX ADMIN — BUPROPION HYDROCHLORIDE 300 MG: 150 TABLET, FILM COATED, EXTENDED RELEASE ORAL at 08:55

## 2023-11-28 RX ADMIN — PANTOPRAZOLE SODIUM 20 MG: 20 TABLET, DELAYED RELEASE ORAL at 08:55

## 2023-11-28 RX ADMIN — HEPARIN SODIUM 5000 UNITS: 5000 INJECTION INTRAVENOUS; SUBCUTANEOUS at 06:00

## 2023-11-28 RX ADMIN — LORAZEPAM 1 MG: 0.5 TABLET ORAL at 08:55

## 2023-11-28 RX ADMIN — TRAMADOL HYDROCHLORIDE 50 MG: 50 TABLET, COATED ORAL at 15:27

## 2023-11-28 RX ADMIN — OXYBUTYNIN CHLORIDE 5 MG: 5 TABLET ORAL at 08:55

## 2023-11-28 ASSESSMENT — PAIN SCALES - GENERAL
PAINLEVEL_OUTOF10: 0 - NO PAIN
PAINLEVEL_OUTOF10: 3
PAINLEVEL_OUTOF10: 8
PAINLEVEL_OUTOF10: 8

## 2023-11-28 ASSESSMENT — PAIN - FUNCTIONAL ASSESSMENT
PAIN_FUNCTIONAL_ASSESSMENT: 0-10

## 2023-11-28 ASSESSMENT — COGNITIVE AND FUNCTIONAL STATUS - GENERAL
DAILY ACTIVITIY SCORE: 24
MOBILITY SCORE: 24

## 2023-11-28 NOTE — CARE PLAN
Problem: Pain - Adult  Goal: Verbalizes/displays adequate comfort level or baseline comfort level  Outcome: Progressing     Problem: Safety - Adult  Goal: Free from fall injury  Outcome: Progressing     Problem: Discharge Planning  Goal: Discharge to home or other facility with appropriate resources  Outcome: Progressing     Problem: Chronic Conditions and Co-morbidities  Goal: Patient's chronic conditions and co-morbidity symptoms are monitored and maintained or improved  Outcome: Progressing     Problem: Pain  Goal: Takes deep breaths with improved pain control throughout the shift  Outcome: Progressing  Goal: Turns in bed with improved pain control throughout the shift  Outcome: Progressing  Goal: Walks with improved pain control throughout the shift  Outcome: Progressing  Goal: Performs ADL's with improved pain control throughout shift  Outcome: Progressing  Goal: Participates in PT with improved pain control throughout the shift  Outcome: Progressing  Goal: Free from opioid side effects throughout the shift  Outcome: Progressing  Goal: Free from acute confusion related to pain meds throughout the shift  Outcome: Progressing   The patient's goals for the shift include      The clinical goals for the shift include pain control    Over the shift, the patient did not make progress toward the following goals. Barriers to progression include none. Recommendations to address these barriers include n/a.

## 2023-11-28 NOTE — DISCHARGE INSTRUCTIONS
You have been prescribed ciprofloxacin 250 mg for 1 more day, please take 1 tablet twice daily.  Please follow-up with your PCP within 1 week of discharge.  Please follow-up with infectious disease within 3 to 4 weeks of discharge.    Please follow-up with pulmonology within 2 to 3 weeks of discharge to follow-up on biopsy results.

## 2023-11-28 NOTE — PROGRESS NOTES
"Infectious disease progress note  Subjective   Right lung mass/abscess chronic for at least a year being followed at [facility]  Right loculated effusion status post aspiration by interventional radiology  Positive blood cultures Pantoea 11- at [facility]  UTI ESBL E. coli 11- at [facility]    Antibiotics  Meropenem discontinued  Cipro 250 p.o. twice daily for 1 more day; this is a corrected note    Objective   Range of Vitals (last 24 hours)  Heart Rate:  [86-99]   Temp:  [36.1 °C (97 °F)-36.6 °C (97.9 °F)]   Resp:  [14-18]   BP: (111-145)/(60-79)   SpO2:  [92 %-99 %]   Daily Weight  11/25/23 : 60.5 kg (133 lb 6.1 oz)    Body mass index is 25.2 kg/m².      Physical Exam  HEENT PERRLA  Chest Tw[place] bilaterally  CVS S1-S2 regular  Patient still complaining of right-sided back pain  Abdomen soft nontender positive bowel sounds  Extremities positive pulse bilaterally no pitting edema      Relevant Results  Labs  Lab Results   Component Value Date    WBC 7.9 11/28/2023    HGB 8.8 (L) 11/28/2023    HCT 28.1 (L) 11/28/2023    MCV 82 11/28/2023     11/28/2023     Lab Results   Component Value Date    GLUCOSE 92 11/28/2023    CALCIUM 9.3 11/28/2023     (L) 11/28/2023    K 3.9 11/28/2023    CO2 26 11/28/2023    CL 98 11/28/2023    BUN 10 11/28/2023    CREATININE 1.07 (H) 11/28/2023   ESR: --No results found for: \"SEDRATE\"No results found for: \"CRP\"  Lab Results   Component Value Date    ALT 13 11/25/2023    AST 13 11/25/2023    ALKPHOS 51 11/25/2023    BILITOT 0.5 11/25/2023       Microbiology    11- positive blood cultures Jorge at [facility]  11- UTI with ESBL E. coli  11- blood cultures no growth 1 day     Imaging  11- chest CT shows right lower lobe cavitary mass 6.1 x 5.0 cm.  The CT scan was reviewed with pulmonary consult in detail  11- status post aspiration in interventional " radiology    Assessment/Plan   1.  Continue 1 more day of Cipro  2.  Patient can follow-up in the next 3 to 4 weeks at my office; card was given to her.  She is okay to discharge home from an infectious disease standpoint    Other issues;  Agoraphobia,   diverticulitis without perforation results   hyperlipidemia chronic  COPD chronic  CAD chronic  Hypertension chronic  Hyperlipidemia chronic       I reviewed and interpreted all lab test imaging studies and documentations from other healthcare providers  I am monitoring for antibiotic side effects and toxicity     Edda Tobin MD

## 2023-11-28 NOTE — DISCHARGE SUMMARY
Discharge Diagnosis  Cavitary lesion of lung, right-sided  Right-sided loculated pleural effusion  ESBL E. coli UTI  Pantoea bacteremia    Issues Requiring Follow-Up  Follow-up for biopsy results with pulmonology  Follow-up with infectious disease    Discharge Meds     Your medication list        START taking these medications        Instructions Last Dose Given Next Dose Due   ciprofloxacin 250 mg tablet  Commonly known as: Cipro      Take 1 tablet (250 mg) by mouth 2 times a day for 1 day.              CHANGE how you take these medications        Instructions Last Dose Given Next Dose Due   LORazepam 1 mg tablet  Commonly known as: Ativan  What changed: additional instructions      Take 1 tablet (1 mg) by mouth 2 times a day.              CONTINUE taking these medications        Instructions Last Dose Given Next Dose Due   ALBUTEROL INHL           b complex vitamins capsule           buPROPion  mg 24 hr tablet  Commonly known as: Wellbutrin XL      Take 1 tablet (300 mg) by mouth once daily in the morning.       calcium-magnesium 300-300 mg tablet           echinacea 500 mg capsule           ELDERBERRY FRUIT ORAL           GINKGO BILOBA ORAL           ginseng 100 mg capsule           Lactobac. rhamnosus GG-inulin 20 billion cell -200 mg capsule           METAMUCIL (SUGAR) ORAL           omeprazole 20 mg DR capsule  Commonly known as: PriLOSEC           peppermint oiL liquid           rosuvastatin 10 mg tablet  Commonly known as: Crestor           solifenacin 5 mg tablet  Commonly known as: VESIcare           Ventolin HFA 90 mcg/actuation inhaler  Generic drug: albuterol           VITAMIN B-12 ORAL                     Where to Get Your Medications        Information about where to get these medications is not yet available    Ask your nurse or doctor about these medications  ciprofloxacin 250 mg tablet         Test Results Pending At Discharge  Pending Labs       Order Current Status    Fungal Culture/Smear  In process    Sterile Fluid Culture/Smear In process    Surgical Pathology Exam In process    Blood Culture Preliminary result    Blood Culture Preliminary result            Hospital Course  74 yo F with PMHx of CAD, HTN, HLD, abdominal aortic ectasia, COPD, Cdiff colitis, GERD, MADDI, panic disorder, depression, obesity, and tobacco dependence presents as a transfer from [facility] for a thoracic surgery evaluation. She was initially admitted to [facility] on 11/17 after being notified that she had positive blood cultures from a recent ED visit. She ultimately was found to have Pantoea bacteremia and an ESBL Ecoli UTI. She was being followed by ID and has been receiving IV abx Meropenem. In addition, she was being seen by pulmonary for a R sided cavitary lung lesion with a loculated effusion on CT. This has apparently had been seen on a prior CT and when her pulmonologist asked her about further workup she refused (pt denies ever knowing about a lung mass). She denies fever, chills, cough, sob, CP, unintentional weight loss, recent travel (did travel to [place] in May), or sick contacts. She was transferred for thoracic surgery evaluation as this service is not available at [facility].  Patient did complain of right rib cage pain.  During her hospital course, interventional radiology was able to biopsy/drain her cavitary lung lesion.  Infectious disease recommended 1 more day of ciprofloxacin 250 upon discharge.  Pulmonology was also consulted and will follow-up with patient.  Upon discharge, patient should follow-up with her PCP within 1 week.  She should follow-up with pulmonology within 7 to 10 days for biopsy results.  Patient should follow-up with infectious disease within 3 to 4 weeks.  Patient discharged on ciprofloxacin 250 mg for 1 day, tramadol every 6 hours as needed for pain for 5 days.  Patient should continue her home medications.    Pertinent Physical Exam At Time of Discharge  Physical  Exam  General:  Pleasant and cooperative. No apparent distress.  HEENT:  Normocephalic, atraumatic, mucus membranes moist.   Neck:  Trachea midline.  No JVD.    Chest:  Clear to auscultation bilaterally. No wheezes, rales, or rhonchi.  CV:  Regular rate and rhythm.  Positive S1/S2.   Abdomen: Bowel sounds present in all four quadrants, abdomen is soft, non-tender, non-distended.  Extremities:  No lower extremity edema or cyanosis.   Neurological:  AAOx3. No focal deficits.  Skin:  Warm and dry.  Outpatient Follow-Up  Future Appointments   Date Time Provider Department Center   12/5/2023  1:30 PM HATTIE [facility] [id number] [place]         Ishmael Lee DO

## 2023-11-28 NOTE — PROGRESS NOTES
"Janee Maddox is a 73 y.o. female on day 3 of admission presenting with Cavitary lesion of lung.    Subjective   Patient is awake and alert.  She is feeling well today.  Denies significant chest pain.  No fever or chills.  No nausea or vomiting.  Patient has mild nonproductive cough.  No hemoptysis.       Objective     Physical Exam    Head and face no deformities  Oropharynx normal mucosa  Neck is supple no thyromegaly  Chest is symmetric no crackles  Heart is regular no murmurs  Abdomen is soft and nontender  Skin is intact    Last Recorded Vitals  Blood pressure 132/62, pulse 86, temperature 36.6 °C (97.9 °F), temperature source Temporal, resp. rate 18, height 1.549 m (5' 1\"), weight 60.5 kg (133 lb 6.1 oz), SpO2 91 %.  Intake/Output last 3 Shifts:  I/O last 3 completed shifts:  In: 333.5 (5.5 mL/kg) [I.V.:33.5 (0.6 mL/kg); IV Piggyback:300]  Out: - (0 mL/kg)   Weight: 60.5 kg                       Assessment/Plan   Principal Problem:    Cavitary lesion of lung    Patient with cavitary lesion in the right lower lobe and small area of loculated pleural effusion.  Differential diagnosis considering necrotizing pneumonia/lung abscess versus necrotizing mass/malignancy.  Patient had CT-guided needle biopsy yesterday.    Plan:  Further management will depend on the results of the biopsy.  Patient can follow-up in our office in 1 week to 10 days to discuss results of the biopsy.  Hopefully by that time we are going to have most of the results.  Antibiotics as per infectious disease.  Ambulate as tolerated.  DVT prophylaxis.      Nathan lFores MD      "

## 2023-11-29 ENCOUNTER — PATIENT OUTREACH (OUTPATIENT)
Dept: CARE COORDINATION | Facility: CLINIC | Age: 73
End: 2023-11-29
Payer: MEDICARE

## 2023-11-29 LAB
BACTERIA BLD CULT: NORMAL
BACTERIA BLD CULT: NORMAL

## 2023-11-29 NOTE — PROGRESS NOTES
Patient returned CM call states she doing okay at home, has a dry cough, and some sob, takes breaks between home activities to assist with breathing.  Patient states appetite has not been good eats when she feels up to it.  Patient states she still has urgency with her UTI, Pt. Encouraged to outreach to PCP, ID and Pulmonology and make follow up appointments, Pt. Verbalized that she will make appointments herself.  Pt is able to complete ADLs and IADls.  Patient completed final dose of Cipro.  Patient had no question or concerns at this time, informed patient CM will continue to outreach for transitions of care, Pt. Verbally agreed.      ABEL JackN, RN.

## 2023-11-29 NOTE — PROGRESS NOTES
Outreach call to patient to support a smooth transition of care from recent admission.  Left voicemail message for patient with my contact information.    ABEL JackN, RN

## 2023-12-01 LAB
BACTERIA FLD CULT: ABNORMAL
BACTERIA FLD CULT: ABNORMAL
GRAM STN SPEC: ABNORMAL
GRAM STN SPEC: ABNORMAL
LAB AP ASR DISCLAIMER: NORMAL
LABORATORY COMMENT REPORT: NORMAL
PATH REPORT.COMMENTS IMP SPEC: NORMAL
PATH REPORT.FINAL DX SPEC: NORMAL
PATH REPORT.GROSS SPEC: NORMAL
PATH REPORT.RELEVANT HX SPEC: NORMAL
PATH REPORT.TOTAL CANCER: NORMAL

## 2023-12-05 ENCOUNTER — TELEMEDICINE (OUTPATIENT)
Dept: BEHAVIORAL HEALTH | Facility: CLINIC | Age: 73
End: 2023-12-05
Payer: MEDICARE

## 2023-12-05 ENCOUNTER — HOSPITAL ENCOUNTER (OUTPATIENT)
Dept: CARDIOLOGY | Facility: HOSPITAL | Age: 73
Discharge: HOME | End: 2023-12-05
Payer: MEDICARE

## 2023-12-05 DIAGNOSIS — F41.1 GAD (GENERALIZED ANXIETY DISORDER): ICD-10-CM

## 2023-12-05 DIAGNOSIS — F32.5 DEPRESSION, MAJOR, IN REMISSION (CMS-HCC): ICD-10-CM

## 2023-12-05 DIAGNOSIS — G47.09 OTHER INSOMNIA: ICD-10-CM

## 2023-12-05 LAB
ATRIAL RATE: 88 BPM
P AXIS: 48 DEGREES
P OFFSET: 189 MS
P ONSET: 130 MS
PR INTERVAL: 188 MS
Q ONSET: 224 MS
QRS COUNT: 14 BEATS
QRS DURATION: 106 MS
QT INTERVAL: 372 MS
QTC CALCULATION(BAZETT): 450 MS
QTC FREDERICIA: 422 MS
R AXIS: 44 DEGREES
T AXIS: 47 DEGREES
T OFFSET: 410 MS
VENTRICULAR RATE: 88 BPM

## 2023-12-05 PROCEDURE — 99214 OFFICE O/P EST MOD 30 MIN: CPT | Performed by: CLINICAL NURSE SPECIALIST

## 2023-12-05 PROCEDURE — 93005 ELECTROCARDIOGRAM TRACING: CPT

## 2023-12-05 RX ORDER — LORAZEPAM 1 MG/1
1 TABLET ORAL 2 TIMES DAILY
Qty: 60 TABLET | Refills: 2 | Status: SHIPPED | OUTPATIENT
Start: 2023-12-05 | End: 2024-04-17 | Stop reason: SDUPTHER

## 2023-12-05 RX ORDER — BUPROPION HYDROCHLORIDE 300 MG/1
300 TABLET ORAL EVERY MORNING
Qty: 90 TABLET | Refills: 1 | Status: SHIPPED | OUTPATIENT
Start: 2023-12-05 | End: 2024-04-17 | Stop reason: WASHOUT

## 2023-12-05 NOTE — PROGRESS NOTES
Outpatient Psychiatry      Subjective   Janee Maddox, a 73 y.o. female, presents as an established patient for an appointment with outpatient psychiatry for follow up/medication management . This is a virtual appointment     Diagnosis:   Generalized anxiety disorder  Depression major in remission    Other insomnia      Patient Active Problem List   Diagnosis    Abdominal aortic ectasia (CMS/HCC)    Abdominal pannus    Anxiety    Change in bowel habits    Chronic cough    Chronic cystitis    Cutaneous skin tags    Dependent edema    Depression, major, in remission (CMS/HCC)    Excess skin of abdomen    Genitourinary syndrome of menopause    GERD (gastroesophageal reflux disease)    Hypercholesteremia    Impaired fasting glucose    Nocturia    Numbness and tingling    Obesity    Osteoarthritis of wrist    Other insomnia    Rhinorrhea    Sensorineural hearing loss of both ears    Urge incontinence of urine    Urinary urgency    Vaginal itching    Class 2 obesity with body mass index (BMI) of 35.0 to 35.9 in adult    Long-term current use of benzodiazepine    ESBL (extended spectrum beta-lactamase) producing bacteria infection    Dysuria    Diverticulosis    Diverticulitis of intestine    COPD (chronic obstructive pulmonary disease) (CMS/HCC)    Collapse    Coronary atherosclerosis due to severely calcified coronary lesion    CAD (coronary artery disease)    C. difficile diarrhea    Agoraphobia    Abnormal CT scan, lung    Knee pain    Recurrent sinusitis    Panic attack    Otitis media    Open wound of anterior abdominal wall    Onychomycosis    Nicotine use disorder    Cigarette nicotine dependence with nicotine-induced disorder    Incisional hernia    ILD (interstitial lung disease) (CMS/HCC)    Generalized anxiety disorder with panic attacks    Anxiety and depression    Right knee pain    Right middle lobe pulmonary nodule    Tinnitus    Umbilical hernia    UTI (urinary tract infection)    Aortic ectasia,  "abdominal (CMS/[facility])    Diarrhea    Abscess    Obesity with body mass index 30 or greater    Obesity, Class I, BMI 30-34.9    Skin tag    Excess skin of abdominal wall    Gastroesophageal reflux disease    Pure hypercholesterolemia    Numbness and tingling sensation of skin    Insomnia    Sensorineural hearing loss, bilateral       Treatment Goals:  Specify outcomes written in observable, behavioral terms:   Anxiety: reducing negative automatic thoughts and reducing physical symptoms of anxiety     Treatment Plan/Recommendations:   Follow-up plan was discussed with patient.    can continue lorazepam 1 mg , twice a day , and can continue bupropion xl 300 mg , each morning , can continue melatonin 10 mg , at bedtime as needed. can call  , for treatment concerns or can call  for any treatment concerns.and scheduling concerns , can follow up for medications in 8 weeks. can continue self care and wellness efforts and maintain other appointments with other medical providers.     Review with patient: Treatment plan reviewed with the patient.  Medication risks/benefit reviewed with the patient    HPI:   no concerns with memory   reconciled medication list , in the [facility] , and psychoeducation provided   support provided   Reviewed notes in the [facility] pertaining to hospitalization , discharge was at the end of November 2023  , she was diagnosed with at discharge per the [facility] discharge paperwork :  \"Cavitary lesion of lung, right-sided  Right-sided loculated pleural effusion  ESBL E. coli UTI  Pantoea bacteremia\"  She has had difficulty scheduling follow up appointments , she agrees for me to reach out to sylvain farr ,  ,  whom spoke to her post discharge for transitional care post hospitalization in order for her to make some more follow up appointments , according to the note closer to the discharge date the patient was doing better than currently , the patient says she is not " doing well with her strength and she feels like her balance is off , encouraged her to also follow up with her pcp related to her symptoms   She is taking precautions so as to reduce falls risks and we discussed the need to use caution with lorazepam as that can increase falls risks   No issues with substance abuse   Support provided to patient for stressors  Mood is low due to stressors , no thoughts of harming herself or others      [facility] psych and medical : see above       I have personally reviewed the OARRS report for JIMENA SINHA. I have considered the risks of abuse, dependence, addiction and diversion.   I have the following concerns: no concerns on oarrs.   Is the patient prescribed a combination of a benzodiazepine and opioid? No.   Last urine drug screening date/ordered: uds 6/12/23   Date of the last Controlled Substance Agreement: signed paper copy , csa in person appointment on 10/24/23  BENZODIAZEPINES   What is the patient's goal of therapy? to manage pepe and support daily functioning.  Is this being achieved with current treatment? yes , she can attend to daily activities without interruption of intense anxiety.   PEPE-7   1. Feeling nervous, anxious or on edge- nearly every day  2. Not being able to stop or control worrying - nearly every day  3. Worrying too much about different things - nearly every day  4. Trouble relaxing - more than half the days  5. Being so restless that it is hard to sit still - not at all  6. Becoming easily annoyed or irritable - several days  7. Feeling afraid as if something awful might happen - not at all  Total Score = 12  Activities of Daily Living:   Yes, it is my opinion that this patient is benefitting from benzodiazepine therapy.   Physical functioning: Same   Family relationships: Same   Social relationships: Same   Mood: Same   Sleep patterns: Same   Overall functioning: Better   Current or Past Use of Non-Controlled Medication: Dopamine/Norepinephrine Reuptake  "Inhibitor.     Current Outpatient Medications:     ALBUTEROL INHL, 2 puffs, Inhalation, [id number], PRN PRN for wheezing, # 18 g, Refill(s) 2, Date: 10/12/2023 14:11:00 EDT, Pharmacy: [id number], 2 puffs Inhalation [id number],PRN:for wheezing, 154.94, 09/26/2023 19:59:00 EDT, Height/Length Dosing, cm, 86.8, 07/30/2023..., Disp: , Rfl:     cyanocobalamin, vitamin B-12, (VITAMIN B-12 ORAL), DAILY, Date: 08/14/2018 14:05:00 EDT, Disp: , Rfl:     ELDERBERRY FRUIT ORAL, Refill(s) 0, Date: 09/10/2020 16:16:00 EDT, Disp: , Rfl:     psyllium seed, with sugar, (METAMUCIL, SUGAR, ORAL), ORAL, Refill(s) 0, Date: 07/08/2021 15:08:00 EDT, Disp: , Rfl:     rosuvastatin (Crestor) 10 mg tablet, 1 tabs, ORAL, QHS, 90 tabs, Date: 08/14/2023 08:21:00 EDT, [facility] #[id number], 1 tabs ORAL QHS, 154.94, 07/30/2023 19:46:00 EDT, Height/Length Dosing, cm, 86.8, 07/30/2023 19:46:00 EDT, Weight Dosing, kg, Disp: , Rfl:     albuterol (Ventolin HFA) 90 mcg/actuation inhaler, Inhale 2 puffs if needed for wheezing or shortness of breath (every 4 to 6 hours as needed)., Disp: , Rfl:     b complex vitamins capsule, Take 1 capsule by mouth once daily., Disp: , Rfl:     buPROPion XL (Wellbutrin XL) 300 mg 24 hr tablet, Take 1 tablet (300 mg) by mouth once daily in the morning., Disp: 90 tablet, Rfl: 1    calcium-magnesium 300-300 mg tablet, Take 1 tablet by mouth once daily., Disp: , Rfl:     echinacea 500 mg capsule, Take 1 capsule by mouth 2 times a day., Disp: , Rfl:     GINKGO BILOBA ORAL, Take by mouth., Disp: , Rfl:     ginseng 100 mg capsule, Take by mouth.  TAKE AS DIRECTED., Disp: , Rfl:     Lactobac. rhamnosus GG-inulin 20 billion cell -200 mg capsule, Take by mouth., Disp: , Rfl:     LORazepam (Ativan) 1 mg tablet, Take 1 tablet (1 mg) by mouth 2 times a day. (Patient taking differently: Take 1 tablet (1 mg) by mouth 2 times a day. Pt takes it \"at 08:00 and 10:00 for 50 years\"), Disp: 60 tablet, Rfl: 1    omeprazole (PriLOSEC) 20 mg DR " capsule, Take 1 capsule (20 mg) by mouth once daily in the morning. Take before meals., Disp: , Rfl:     peppermint oiL liquid, Apply topically., Disp: , Rfl:     solifenacin (VESIcare) 5 mg tablet, Take 1 tablet (5 mg) by mouth once daily at bedtime. Swallow tablet whole; do not crush, chew, or split., Disp: , Rfl:   Medical History:  Past Medical History:   Diagnosis Date    Agoraphobia, unspecified 12/06/2016    Agoraphobia    Body mass index (BMI) 33.0-33.9, adult     BMI 33.0-33.9,adult    Chronic sinusitis, unspecified     Recurrent sinus infections    Cutaneous abscess, unspecified 06/01/2016    Abscess    Disruption of external operation (surgical) wound, not elsewhere classified, initial encounter 07/31/2017    Open abdominal incision with drainage    Diverticulitis of intestine, part unspecified, without perforation or abscess without bleeding 08/13/2018    Acute diverticulitis    Encounter for immunization 04/21/2021    Encounter for immunization    Encounter for screening for lipoid disorders 04/26/2016    Screening cholesterol level    Incisional hernia without obstruction or gangrene 09/11/2015    Incisional hernia    Infection following a procedure, other surgical site, initial encounter 04/20/2017    Incisional infection    Local infection of the skin and subcutaneous tissue, unspecified 05/01/2019    Skin infection    Other conditions influencing health status     Complete Colonoscopy    Other specified postprocedural states 05/15/2018    History of incision and drainage    Otitis media, unspecified, left ear 05/19/2014    Otitis media, left    Pain in right knee 02/16/2015    Bilateral knee pain    Personal history of other diseases of the circulatory system     History of hypertension    Personal history of other infectious and parasitic diseases 07/03/2018    History of onychomycosis    Personal history of other infectious and parasitic diseases 10/18/2019    History of Clostridioides difficile  colitis    Personal history of other specified conditions 04/20/2017    History of dysuria    Personal history of other specified conditions 02/02/2017    History of nocturia    Personal history of other specified conditions 10/18/2019    History of diarrhea    Personal history of other specified conditions 12/06/2019    History of dysuria    Personal history of other specified conditions 02/16/2015    History of dyspnea    Personal history of urinary (tract) infections 12/11/2019    History of urinary tract infection    Syncope and collapse 01/05/2018    Collapse    Tinnitus, left ear 08/29/2014    Tinnitus of left ear    Unspecified open wound of abdominal wall, unspecified quadrant without penetration into peritoneal cavity, initial encounter 08/23/2019    Wound, open, abdominal wall, anterior     Surgical History:  Past Surgical History:   Procedure Laterality Date    CARPAL TUNNEL RELEASE  05/20/2014    Neuroplasty Decompression Median Nerve At Carpal Tunnel    CHOLECYSTECTOMY  05/20/2014    Cholecystectomy    COLONOSCOPY  01/08/2021    Complete Colonoscopy    CT GUIDED ASPIRATION OF ABSCESS, HEMATOMA, CYST  11/27/2023    CT GUIDED ASPIRATION OF ABSCESS, HEMATOMA, CYST 11/27/2023 PAR CT    CT GUIDED PERCUTANEOUS BIOPSY LUNG  11/27/2023    CT GUIDED PERCUTANEOUS BIOPSY LUNG 11/27/2023 PAR CT    HYSTERECTOMY  05/20/2014    Hysterectomy    KNEE ARTHROSCOPY W/ DEBRIDEMENT  02/16/2015    Knee Arthroscopy (Therapeutic)    OTHER SURGICAL HISTORY  08/20/2019    Incisional Hernia Repair    RECTAL VAGINAL FISTULECTOMY  05/20/2014    Rectocele Repair     Family History:  Family History   Problem Relation Name Age of Onset    Hypertension Mother      Other (cardiac disorder) Father      Heart attack Maternal Grandfather       Social History:  Social History     Socioeconomic History    Marital status:      Spouse name: Not on file    Number of children: Not on file    Years of education: Not on file    Highest  education level: Not on file   Occupational History    Not on file   Tobacco Use    Smoking status: Every Day     Packs/day: 1.00     Years: 62.00     Additional pack years: 0.00     Total pack years: 62.00     Types: Cigarettes    Smokeless tobacco: Not on file   Substance and Sexual Activity    Alcohol use: Not on file    Drug use: Not on file    Sexual activity: Not on file   Other Topics Concern    Not on file   Social History Narrative    Not on file     Social Determinants of Health     Financial Resource Strain: High Risk (11/25/2023)    Overall Financial Resource Strain (CARDIA)     Difficulty of Paying Living Expenses: Hard   Food Insecurity: Not on file   Transportation Needs: No Transportation Needs (11/25/2023)    PRAPARE - Transportation     Lack of Transportation (Medical): No     Lack of Transportation (Non-Medical): No   Physical Activity: Not on file   Stress: Not on file   Social Connections: Not on file   Intimate Partner Violence: Not on file   Housing Stability: Low Risk  (11/25/2023)    Housing Stability Vital Sign     Unable to Pay for Housing in the Last Year: No     Number of Places Lived in the Last Year: 1     Unstable Housing in the Last Year: No     Record Review: brief     Vitals:  There were no vitals filed for this visit.    Maday uW, APRN-CNS

## 2023-12-06 LAB
ELECTRONICALLY SIGNED BY: NORMAL
FOCUSED SOLID TUMOR DNA/RNA RESULTS: NORMAL

## 2023-12-07 NOTE — DOCUMENTATION CLARIFICATION NOTE
"    PATIENT:               JIMENA SINHA  ACCT #:                  [id number]  MRN:                       [id number]  :                       1950  ADMIT DATE:       2023 1:40 AM  DISCH DATE:        2023 6:52 PM  RESPONDING PROVIDER #:        [id number]          PROVIDER RESPONSE TEXT:    I concur with the pathology report findings and they are clinically significant    CDI QUERY TEXT:    [facility]_Path Results Simple        Instruction:    Based on your assessment of the patient and the clinical information, please provide the requested documentation by clicking on the appropriate radio button and enter any additional information if prompted.    Question: Please document whether you concur or do not concur with the pathology report findings    When answering this query, please exercise your independent professional judgment. The fact that a question is being asked, does not imply that any particular answer is desired or expected.    The patient's clinical indicators include:  Clinical Information: Per H/P 23 by Dr. Prudencio Coreas \"74 yo F with PMHx of CAD, HTN, HLD, abdominal aortic ectasia, COPD, Cdiff colitis, GERD, MADDI, panic disorder, depression, obesity, and tobacco dependence presents as a transfer from [facility] for a thoracic surgery evaluation. She was initially admitted to [facility] on  after being notified that she had positive blood cultures from a recent ED visit. She ultimately was found to have Pantoea bacteremia and an ESBL Ecoli UTI. She was being followed by ID and has been receiving IV abx Meropenem.\"    Clinical Indicators and Pathology Findings: Surgical Pathology 23 A. LUNG, RIGHT BIOPSY  INVASIVE SQUAMOUS CELL CARCINOMA, KERATINIZING    Treatment: Pulmonology consult, biopsy    Risk Factors: Age, HTN, CAD, COPD  Options provided:  -- I concur with the pathology report findings and they are clinically significant  -- I do not concur with the pathology " report findings  -- Other - I will add my own diagnosis  -- Refer to Clinical Documentation Reviewer    Query created by: Kaci Lopez on 12/7/2023 1:28 PM      Electronically signed by:  MIGUEL PATIÑO DO 12/7/2023 2:11 PM

## 2023-12-08 ENCOUNTER — TUMOR BOARD CONFERENCE (OUTPATIENT)
Dept: HEMATOLOGY/ONCOLOGY | Facility: CLINIC | Age: 73
End: 2023-12-08
Payer: MEDICARE

## 2023-12-08 NOTE — TUMOR BOARD NOTE
Patient Name: JIMENA SINHA  MRN: [id number]  Physician:   Date of Collection: 11-  Report Date: 12.1.2023  Primary Location of Tumor: Right Lower Lobe  Histology: Invasive Squamous Cell Carcinoma  Stage: IV  Location of Metastasis: Pleura  PDL 1: 5%  Actionable Alteration:  None    Disease Relevant Alterations: TP53 p.G245V ([id number] c.734G>T)      Recommendations: Standard of Care:Chemotherapy+Immunotherapy    Clinical Trials (First line):   [id number]([id number]):A Phase 1b, Multicenter, 2-Part, Open-Label Study of Datopotamab Deruxtecan (Data-DXd) in Combination With Durvalumab With or Without [place] Chemotherapy in Subjects With Advanced or Metastatic Non-Small Cell Lung Cancer ([facility])    GSOH4R47(NCT):A Phase I/II Basket Trial of the EGF Vaccine CIMAvax in Combination With Anti-PD1 Therapy in Patients With Advanced NSCLC or Squamous Head and Neck Cancer

## 2023-12-15 ENCOUNTER — TELEPHONE (OUTPATIENT)
Dept: GASTROENTEROLOGY | Facility: HOSPITAL | Age: 73
End: 2023-12-15
Payer: MEDICARE

## 2023-12-15 NOTE — TELEPHONE ENCOUNTER
Dr. Flores sent us a referral for this patient to be seen by Dr. Gonsales yesterday. I called today and left a message for this patient to call me so she can get her an appointment.

## 2023-12-18 ENCOUNTER — TELEPHONE (OUTPATIENT)
Dept: GASTROENTEROLOGY | Facility: HOSPITAL | Age: 73
End: 2023-12-18
Payer: MEDICARE

## 2023-12-18 LAB
FUNGUS SPEC CULT: NORMAL
FUNGUS SPEC FUNGUS STN: NORMAL

## 2023-12-18 NOTE — TELEPHONE ENCOUNTER
I called patient to get her an appointment. I gave patient times and dates that I had but they did not work out for the patient. She took [facility]'s phone number and also Central Scheduling phone number to see what times and days they have open that will work for her schedule.

## 2024-01-02 ENCOUNTER — LAB (OUTPATIENT)
Dept: LAB | Facility: LAB | Age: 74
End: 2024-01-02
Payer: MEDICARE

## 2024-01-02 DIAGNOSIS — R06.02 SHORTNESS OF BREATH: Primary | ICD-10-CM

## 2024-01-02 PROCEDURE — 36415 COLL VENOUS BLD VENIPUNCTURE: CPT

## 2024-01-02 PROCEDURE — 85025 COMPLETE CBC W/AUTO DIFF WBC: CPT

## 2024-01-03 LAB
BASOPHILS # BLD AUTO: 0.12 X10*3/UL (ref 0–0.1)
BASOPHILS NFR BLD AUTO: 1.4 %
EOSINOPHIL # BLD AUTO: 0.5 X10*3/UL (ref 0–0.4)
EOSINOPHIL NFR BLD AUTO: 5.9 %
ERYTHROCYTE [DISTWIDTH] IN BLOOD BY AUTOMATED COUNT: 16.9 % (ref 11.5–14.5)
HCT VFR BLD AUTO: 30.5 % (ref 36–46)
HGB BLD-MCNC: 9 G/DL (ref 12–16)
IMM GRANULOCYTES # BLD AUTO: 0.06 X10*3/UL (ref 0–0.5)
IMM GRANULOCYTES NFR BLD AUTO: 0.7 % (ref 0–0.9)
LYMPHOCYTES # BLD AUTO: 1.05 X10*3/UL (ref 0.8–3)
LYMPHOCYTES NFR BLD AUTO: 12.3 %
MCH RBC QN AUTO: 24.1 PG (ref 26–34)
MCHC RBC AUTO-ENTMCNC: 29.5 G/DL (ref 32–36)
MCV RBC AUTO: 82 FL (ref 80–100)
MONOCYTES # BLD AUTO: 0.77 X10*3/UL (ref 0.05–0.8)
MONOCYTES NFR BLD AUTO: 9 %
NEUTROPHILS # BLD AUTO: 6.04 X10*3/UL (ref 1.6–5.5)
NEUTROPHILS NFR BLD AUTO: 70.7 %
NRBC BLD-RTO: 0 /100 WBCS (ref 0–0)
PLATELET # BLD AUTO: 298 X10*3/UL (ref 150–450)
RBC # BLD AUTO: 3.73 X10*6/UL (ref 4–5.2)
WBC # BLD AUTO: 8.5 X10*3/UL (ref 4.4–11.3)

## 2024-01-11 ENCOUNTER — HOSPITAL ENCOUNTER (OUTPATIENT)
Dept: RADIOLOGY | Facility: CLINIC | Age: 74
Discharge: HOME | End: 2024-01-11
Payer: MEDICARE

## 2024-01-11 DIAGNOSIS — C34.12 MALIGNANT NEOPLASM OF UPPER LOBE, LEFT BRONCHUS OR LUNG (MULTI): ICD-10-CM

## 2024-01-11 PROCEDURE — 78815 PET IMAGE W/CT SKULL-THIGH: CPT | Mod: PI

## 2024-01-11 PROCEDURE — 78815 PET IMAGE W/CT SKULL-THIGH: CPT | Mod: PET TUMOR INIT TX STRAT | Performed by: NUCLEAR MEDICINE

## 2024-01-11 PROCEDURE — 3430000001 HC RX 343 DIAGNOSTIC RADIOPHARMACEUTICALS: Mod: MUE | Performed by: INTERNAL MEDICINE

## 2024-01-11 PROCEDURE — A9552 F18 FDG: HCPCS | Mod: MUE | Performed by: INTERNAL MEDICINE

## 2024-01-11 RX ORDER — FLUDEOXYGLUCOSE F 18 200 MCI/ML
12.5 INJECTION, SOLUTION INTRAVENOUS
Status: COMPLETED | OUTPATIENT
Start: 2024-01-11 | End: 2024-01-11

## 2024-01-11 RX ADMIN — FLUDEOXYGLUCOSE F 18 12.5 MILLICURIE: 200 INJECTION, SOLUTION INTRAVENOUS at 10:14

## 2024-01-19 ENCOUNTER — PATIENT OUTREACH (OUTPATIENT)
Dept: CARE COORDINATION | Facility: CLINIC | Age: 74
End: 2024-01-19
Payer: MEDICARE

## 2024-01-24 ENCOUNTER — OFFICE VISIT (OUTPATIENT)
Dept: HEMATOLOGY/ONCOLOGY | Facility: CLINIC | Age: 74
End: 2024-01-24
Payer: MEDICARE

## 2024-01-24 ENCOUNTER — TELEPHONE (OUTPATIENT)
Dept: PALLIATIVE MEDICINE | Facility: HOSPITAL | Age: 74
End: 2024-01-24

## 2024-01-24 ENCOUNTER — SOCIAL WORK (OUTPATIENT)
Dept: HEMATOLOGY/ONCOLOGY | Facility: CLINIC | Age: 74
End: 2024-01-24
Payer: MEDICARE

## 2024-01-24 ENCOUNTER — LAB (OUTPATIENT)
Dept: LAB | Facility: CLINIC | Age: 74
End: 2024-01-24
Payer: MEDICARE

## 2024-01-24 VITALS
BODY MASS INDEX: 34.11 KG/M2 | WEIGHT: 173.72 LBS | TEMPERATURE: 96.8 F | RESPIRATION RATE: 18 BRPM | DIASTOLIC BLOOD PRESSURE: 69 MMHG | SYSTOLIC BLOOD PRESSURE: 122 MMHG | HEIGHT: 60 IN | OXYGEN SATURATION: 94 % | HEART RATE: 90 BPM

## 2024-01-24 DIAGNOSIS — C34.31 SQUAMOUS CELL CARCINOMA OF LOWER LOBE OF RIGHT LUNG (MULTI): Primary | ICD-10-CM

## 2024-01-24 DIAGNOSIS — C34.11 SQUAMOUS CELL CARCINOMA OF UPPER LOBE OF RIGHT LUNG (MULTI): ICD-10-CM

## 2024-01-24 LAB
ALBUMIN SERPL BCP-MCNC: 3.4 G/DL (ref 3.4–5)
ALP SERPL-CCNC: 64 U/L (ref 33–136)
ALT SERPL W P-5'-P-CCNC: 5 U/L (ref 7–45)
ANION GAP SERPL CALC-SCNC: 13 MMOL/L (ref 10–20)
APPEARANCE UR: ABNORMAL
AST SERPL W P-5'-P-CCNC: 7 U/L (ref 9–39)
BACTERIA #/AREA URNS AUTO: ABNORMAL /HPF
BASOPHILS # BLD AUTO: 0.09 X10*3/UL (ref 0–0.1)
BASOPHILS NFR BLD AUTO: 1.2 %
BILIRUB SERPL-MCNC: 0.3 MG/DL (ref 0–1.2)
BILIRUB UR STRIP.AUTO-MCNC: NEGATIVE MG/DL
BUN SERPL-MCNC: 17 MG/DL (ref 6–23)
CALCIUM SERPL-MCNC: 9.3 MG/DL (ref 8.6–10.3)
CHLORIDE SERPL-SCNC: 99 MMOL/L (ref 98–107)
CO2 SERPL-SCNC: 29 MMOL/L (ref 21–32)
COLOR UR: YELLOW
CREAT SERPL-MCNC: 1.41 MG/DL (ref 0.5–1.05)
EGFRCR SERPLBLD CKD-EPI 2021: 39 ML/MIN/1.73M*2
EOSINOPHIL # BLD AUTO: 0.7 X10*3/UL (ref 0–0.4)
EOSINOPHIL NFR BLD AUTO: 9 %
ERYTHROCYTE [DISTWIDTH] IN BLOOD BY AUTOMATED COUNT: 17.1 % (ref 11.5–14.5)
GLUCOSE SERPL-MCNC: 135 MG/DL (ref 74–99)
GLUCOSE UR STRIP.AUTO-MCNC: NEGATIVE MG/DL
HCT VFR BLD AUTO: 31.3 % (ref 36–46)
HGB BLD-MCNC: 9.2 G/DL (ref 12–16)
HOLD SPECIMEN: NORMAL
IMM GRANULOCYTES # BLD AUTO: 0.06 X10*3/UL (ref 0–0.5)
IMM GRANULOCYTES NFR BLD AUTO: 0.8 % (ref 0–0.9)
KETONES UR STRIP.AUTO-MCNC: NEGATIVE MG/DL
LEUKOCYTE ESTERASE UR QL STRIP.AUTO: ABNORMAL
LYMPHOCYTES # BLD AUTO: 1.16 X10*3/UL (ref 0.8–3)
LYMPHOCYTES NFR BLD AUTO: 14.9 %
MCH RBC QN AUTO: 24.4 PG (ref 26–34)
MCHC RBC AUTO-ENTMCNC: 29.4 G/DL (ref 32–36)
MCV RBC AUTO: 83 FL (ref 80–100)
MONOCYTES # BLD AUTO: 0.64 X10*3/UL (ref 0.05–0.8)
MONOCYTES NFR BLD AUTO: 8.2 %
MUCOUS THREADS #/AREA URNS AUTO: ABNORMAL /LPF
NEUTROPHILS # BLD AUTO: 5.13 X10*3/UL (ref 1.6–5.5)
NEUTROPHILS NFR BLD AUTO: 65.9 %
NITRITE UR QL STRIP.AUTO: POSITIVE
NRBC BLD-RTO: 0 /100 WBCS (ref 0–0)
PH UR STRIP.AUTO: 6 [PH]
PLATELET # BLD AUTO: 284 X10*3/UL (ref 150–450)
POTASSIUM SERPL-SCNC: 3 MMOL/L (ref 3.5–5.3)
PROT SERPL-MCNC: 6.8 G/DL (ref 6.4–8.2)
PROT UR STRIP.AUTO-MCNC: ABNORMAL MG/DL
RBC # BLD AUTO: 3.77 X10*6/UL (ref 4–5.2)
RBC # UR STRIP.AUTO: NEGATIVE /UL
RBC #/AREA URNS AUTO: ABNORMAL /HPF
SODIUM SERPL-SCNC: 138 MMOL/L (ref 136–145)
SP GR UR STRIP.AUTO: 1.01
SQUAMOUS #/AREA URNS AUTO: ABNORMAL /HPF
UROBILINOGEN UR STRIP.AUTO-MCNC: <2 MG/DL
WBC # BLD AUTO: 7.8 X10*3/UL (ref 4.4–11.3)
WBC #/AREA URNS AUTO: >50 /HPF
WBC CLUMPS #/AREA URNS AUTO: ABNORMAL /HPF

## 2024-01-24 PROCEDURE — 1159F MED LIST DOCD IN RCRD: CPT | Performed by: INTERNAL MEDICINE

## 2024-01-24 PROCEDURE — 1160F RVW MEDS BY RX/DR IN RCRD: CPT | Performed by: INTERNAL MEDICINE

## 2024-01-24 PROCEDURE — 85025 COMPLETE CBC W/AUTO DIFF WBC: CPT

## 2024-01-24 PROCEDURE — 80053 COMPREHEN METABOLIC PANEL: CPT

## 2024-01-24 PROCEDURE — 1125F AMNT PAIN NOTED PAIN PRSNT: CPT | Performed by: INTERNAL MEDICINE

## 2024-01-24 PROCEDURE — 87086 URINE CULTURE/COLONY COUNT: CPT | Mod: PARLAB

## 2024-01-24 PROCEDURE — 36415 COLL VENOUS BLD VENIPUNCTURE: CPT

## 2024-01-24 PROCEDURE — 99205 OFFICE O/P NEW HI 60 MIN: CPT | Performed by: INTERNAL MEDICINE

## 2024-01-24 PROCEDURE — 81001 URINALYSIS AUTO W/SCOPE: CPT

## 2024-01-24 PROCEDURE — 99215 OFFICE O/P EST HI 40 MIN: CPT | Performed by: INTERNAL MEDICINE

## 2024-01-24 RX ORDER — HEPARIN 100 UNIT/ML
500 SYRINGE INTRAVENOUS AS NEEDED
Status: CANCELLED | OUTPATIENT
Start: 2024-01-31

## 2024-01-24 RX ORDER — FAMOTIDINE 10 MG/ML
20 INJECTION INTRAVENOUS ONCE
Status: CANCELLED | OUTPATIENT
Start: 2024-02-19

## 2024-01-24 RX ORDER — DIPHENHYDRAMINE HYDROCHLORIDE 50 MG/ML
50 INJECTION INTRAMUSCULAR; INTRAVENOUS AS NEEDED
Status: CANCELLED | OUTPATIENT
Start: 2024-02-19

## 2024-01-24 RX ORDER — HEPARIN SODIUM,PORCINE/PF 10 UNIT/ML
50 SYRINGE (ML) INTRAVENOUS AS NEEDED
Status: CANCELLED | OUTPATIENT
Start: 2024-01-31

## 2024-01-24 RX ORDER — PROCHLORPERAZINE EDISYLATE 5 MG/ML
10 INJECTION INTRAMUSCULAR; INTRAVENOUS EVERY 6 HOURS PRN
Status: CANCELLED | OUTPATIENT
Start: 2024-02-19

## 2024-01-24 RX ORDER — PROCHLORPERAZINE MALEATE 5 MG
10 TABLET ORAL EVERY 6 HOURS PRN
Status: CANCELLED | OUTPATIENT
Start: 2024-02-19

## 2024-01-24 RX ORDER — DIPHENHYDRAMINE HCL 50 MG
50 CAPSULE ORAL ONCE
Status: CANCELLED | OUTPATIENT
Start: 2024-02-19

## 2024-01-24 RX ORDER — EPINEPHRINE 0.3 MG/.3ML
0.3 INJECTION SUBCUTANEOUS EVERY 5 MIN PRN
Status: CANCELLED | OUTPATIENT
Start: 2024-02-19

## 2024-01-24 RX ORDER — PALONOSETRON 0.05 MG/ML
0.25 INJECTION, SOLUTION INTRAVENOUS ONCE
Status: CANCELLED | OUTPATIENT
Start: 2024-02-19

## 2024-01-24 RX ORDER — ALBUTEROL SULFATE 0.83 MG/ML
3 SOLUTION RESPIRATORY (INHALATION) AS NEEDED
Status: CANCELLED | OUTPATIENT
Start: 2024-02-19

## 2024-01-24 RX ORDER — FAMOTIDINE 10 MG/ML
20 INJECTION INTRAVENOUS ONCE AS NEEDED
Status: CANCELLED | OUTPATIENT
Start: 2024-02-19

## 2024-01-24 ASSESSMENT — PAIN SCALES - GENERAL: PAINLEVEL: 8

## 2024-01-24 ASSESSMENT — COLUMBIA-SUICIDE SEVERITY RATING SCALE - C-SSRS
6. HAVE YOU EVER DONE ANYTHING, STARTED TO DO ANYTHING, OR PREPARED TO DO ANYTHING TO END YOUR LIFE?: NO
1. IN THE PAST MONTH, HAVE YOU WISHED YOU WERE DEAD OR WISHED YOU COULD GO TO SLEEP AND NOT WAKE UP?: NO
2. HAVE YOU ACTUALLY HAD ANY THOUGHTS OF KILLING YOURSELF?: NO

## 2024-01-24 ASSESSMENT — PATIENT HEALTH QUESTIONNAIRE - PHQ9
2. FEELING DOWN, DEPRESSED OR HOPELESS: NOT AT ALL
1. LITTLE INTEREST OR PLEASURE IN DOING THINGS: NOT AT ALL
SUM OF ALL RESPONSES TO PHQ9 QUESTIONS 1 AND 2: 0

## 2024-01-24 NOTE — PROGRESS NOTES
Pt and dtr had office visit today. Pt has been dx with lung cancer. Dtr with pt today explained that she has two sons who are autistic and need her home. Dtr also explained she herself is recovering from a brain aneurysm and cannot handle the stress. 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 Pt at this point seems to be functional but dtr worried she will need more help. Pt angry with the fact of what she is currently dealing with at this point. At first after reviewing income felt options program would be ideal but then learned they were not in [facility] but [place]. Gave them info on the [facility] to call and make an appt for an assessment to see what [place] has in place to assist pt at home. Pt has been on ativan and wellbutrin for a great part of her life. Talked further with pt about possibly needing additional help for someone to review current meds and see if there is something else that may help her. Pt at first seemed like she did not want to but then agreed after Onc NOELLEW-S explained she has a lot on her plate and any little bit right now of help would be good. Gave card to both dtr and pt and provided support.

## 2024-01-24 NOTE — PROGRESS NOTES
Patient ID: : Janee Maddox            Primary Care Provider: Frances Cervantes MD    LOCATION:  [facility]    REFERRING PHYSICIAN:  Dr. 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  T p53 mutated.    CHIEF COMPLAINTS:  Patient is in the clinic today for evaluation of newly diagnosed right lung squamous cell carcinoma.    HISTORY:  Janee Maddox is a 73 y.o. female with right lung squamous cell carcinoma.  She is a lifelong smoker and was admitted at [facility] in Nov 2023 with complaining of shortness of breath, chest discomfort and positive bacteremia.  During the evaluation she was noted to have a large cavitary lesion in the right lung along with pleural effusion.  CT scan at that time showed a 6.1 x 5 cm right lower lobe cavitary lung mass along with loculated right-sided pleural effusion and prominent mediastinal and hilar lymphadenopathy.  Overall findings are concerning for either abscess or baseline malignancy.  She was transferred to [facility] where a CT-guided biopsy confirmed Invasive squamous cell carcinoma.  PD-L1 expression was 5%.  Tumor NGS panel was mainly suggestive of TP53 mutation.  During hospitalization her clinical course was also complicated by Pantoea bacteremia and an ESBL Ecoli UTI.  She was treated with antibiotics and other supportive care.  A follow-up PET scan from January 2024 confirmed increased metabolic activity in the right lower lobe pleural-based cavitary mass along with mediastinal and right hilar lymphadenopathy and right-sided pleural disease consistent with known malignancy.  There was no evidence of distant metastatic disease.    INTERVAL HISTORY:  This is my first visit with her.  She appears very frustrated.  She is complaining of worsening shortness of breath and right-sided chest pain.  Her daughter has also noted her to be slightly unsteady  "at times.  She is having issues with persistent urinary frequency.    PAST MEDICAL HISTORY:  1.  Right lower lobe squamous cell lung cancer as detailed above.  2.  Coronary artery disease  3.  Hypertension  4.  Hyperlipidemia  5.  GERD  6.  Anxiety/depression  7.  History of C. difficile colitis  8..  E. coli UTI.  9.  History of complete hysterectomy, cholecystectomy and incisional hernia surgeries    SOCIAL HISTORY:  She lives alone in [place].  Quit smoking in 2023.  1 pack a day for 60 years prior smoking history.  Admit to occasional alcohol intake.  She work as a tax preparor.  Born and raised in [place].    FAMILY HISTORY:  Father  at age 86 from coronary artery disease. Mother also  at age 86 from multiple medical issues.  She had 1 sister and 4/2 siblings.  One of them  from myocardial infarction.  3 children, 7 grandkids and 1 great grandkids all alive and well.  No other family history of bleeding, clotting or malignant disorders in the family history.    REVIEW OF SYSTEMS:   Pertinent finding as per the history above.  All other systems have been reviewed and generally negative and noncontributory.    VITAL SIGNS  BSA: 1.83 meters squared  /69   Pulse 90   Temp 36 °C (96.8 °F)   Resp 18   Ht 1.529 m (5' 0.2\")   Wt 78.8 kg (173 lb 11.6 oz)   SpO2 94%   BMI 33.71 kg/m²     PHYSICAL EXAMINATION:  Detailed physical examination not done.    LAB DATA:  CBC from today shows a hemoglobin of 9.2 with MCV of 83.  White cell count is normal at 7.8 and platelets are 284 K.  Metabolic profile is unremarkable other than creatinine of 1.4.    UA from today shows a positive nitrite and leukocyte Estrace along with 1+ protein.  Follow-up urine cultures is pending.    RADIOLOGICAL DATA:  CT and PET scan results were reviewed and have been detailed in the history above.    PATHOLOGY DATA:  Surgical Pathology Exam: [id number]   FINAL DIAGNOSIS   A. LUNG, RIGHT; BIOPSY:    -- INVASIVE " SQUAMOUS CELL CARCINOMA, KERATINIZING. See comment   Electronically signed by Lino Carrillo MD on 12/1/2023      PD-L1 22C3  (KEYTRUDA)Tumor Proportion Score (TPS): 5% MICROSATELLITE STATUS: Microsatellite Instability-High (MSI-H) is NOT DETECTED.      DISEASE ASSOCIATED GENOMIC FINDINGS:   TP53 p.G245V ([id number] c.734G>T)     DISEASE RELEVANT ALTERATIONS NOT DETECTED:   Negative for ALK fusion.  Negative for BRAF V600E.  Negative for EGFR sensitizing mutation.  Negative for ERBB2 activating mutation  Negative for KRAS G12C.  Negative for MET exon 14 skipping mutation.  Negative for NTRK fusion.  Negative for RET fusion.  Negative for ROS1 fusion.    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  TP53   mutated.  Please have further details of initial presentation and management as outlined above.  In summary patient is a lifelong smoker and was noted to have a large cavitary right lower lung pleural-based lesion along with extensive mediastinal and hilar lymphadenopathy and loculated pleural effusion.  CT-guided biopsy from November 2023 was confirmatory of invasive squamous cell carcinoma.  PD-L1 TPS score is 5%.  NGS panel is unremarkable other than finding of TP53 mutation.    Long discussion with the patient and daughter and they were explained about the diagnosis, stage, prognosis and treatment/management option.  In view of the pleural-based involvement she has stage IV disease and is not a surgical candidate. Main treatment option is systemic therapy with combination of chemotherapy and immunotherapy.  If possible, I will try to try her on combination of carboplatin/Taxol/Keytruda.  Probable benefit and side effect profile of mainly myelosuppression, hair loss, weakness, fatigue, neurotoxicity, nephrotoxicity, colitis, pneumonitis, endocrinopathies etc. were explained to them in detail.  To complete the workup I will also schedule her for brain MRI and we will also  request interventional radiology help for Mediport placement.  Her overall prognosis is guarded.    2.  Chest pain.  Most likely secondary to baseline malignancy.  As per the patient she is allergic to codeine and similar drug and as such cannot try any narcotics.  It will be a issue to effectively manage pain.  I will request evaluation with our palliative care team and supportive oncology clinic as soon as possible.  Will appreciate their help and guidance.    3.  Anxiety/depression.  Patient seems to have significant baseline anxiety.   I think she will also benefit from psycho-oncology support.      A total of 60 minutes were spent in evaluation - performing physical examination, history taking, review of the previous extensive records/information and formulating current and future management plan. Majority of the time was spend in consultation and discussion.    This note has been transcribed using Dragon voice recognition system and there is a possibility of unintentional typing misprints.

## 2024-01-24 NOTE — TELEPHONE ENCOUNTER
Patient referred to supportive oncology/palliative care by Dr. Gonsales. Patient with new lung cancer experiencing cancer related pain, fatigue, GOC, mood. Patient was also referred to oncopsych. I called patient and left VM asking for a call back.

## 2024-01-26 ENCOUNTER — TELEPHONE (OUTPATIENT)
Dept: HEMATOLOGY/ONCOLOGY | Facility: CLINIC | Age: 74
End: 2024-01-26
Payer: MEDICARE

## 2024-01-26 DIAGNOSIS — C34.31 SQUAMOUS CELL CARCINOMA OF LOWER LOBE OF RIGHT LUNG (MULTI): Primary | ICD-10-CM

## 2024-01-26 DIAGNOSIS — N39.0 URINARY TRACT INFECTION WITHOUT HEMATURIA, SITE UNSPECIFIED: Primary | ICD-10-CM

## 2024-01-26 LAB — BACTERIA UR CULT: ABNORMAL

## 2024-01-26 RX ORDER — NITROFURANTOIN 25; 75 MG/1; MG/1
100 CAPSULE ORAL 2 TIMES DAILY
Qty: 20 CAPSULE | Refills: 2 | Status: SHIPPED | OUTPATIENT
Start: 2024-01-26 | End: 2024-01-26

## 2024-01-26 RX ORDER — HYDROCODONE BITARTRATE AND ACETAMINOPHEN 10; 325 MG/1; MG/1
1 TABLET ORAL EVERY 8 HOURS PRN
Qty: 30 TABLET | Refills: 0 | Status: SHIPPED | OUTPATIENT
Start: 2024-01-26 | End: 2024-02-25

## 2024-01-26 RX ORDER — TRAMADOL HYDROCHLORIDE 50 MG/1
50 TABLET ORAL EVERY 8 HOURS PRN
Qty: 30 TABLET | Refills: 0 | Status: SHIPPED
Start: 2024-01-26 | End: 2024-01-26

## 2024-01-26 NOTE — TELEPHONE ENCOUNTER
Patient with positive culture for UTI; unfortunately sensitivity came back with the only two oral medications able to be used patient is allergic to.  Advised, per DR. Gonsales, that she should go to ED today to start ATB and see if she needs admitted to finish course.  Patient with history of urosepsis so this was made clear it is urgent.  She verbalized understanding that she needs to go to ED for evaluation, as only ATB showing sensitivity are IV and we can't get those going in our outpatient setting.

## 2024-02-08 ENCOUNTER — TELEPHONE (OUTPATIENT)
Dept: SCHEDULING | Age: 74
End: 2024-02-08
Payer: MEDICARE

## 2024-02-10 ENCOUNTER — HOSPITAL ENCOUNTER (OUTPATIENT)
Dept: RADIOLOGY | Facility: HOSPITAL | Age: 74
Discharge: HOME | End: 2024-02-10
Payer: MEDICARE

## 2024-02-10 DIAGNOSIS — C34.31 SQUAMOUS CELL CARCINOMA OF LOWER LOBE OF RIGHT LUNG (MULTI): ICD-10-CM

## 2024-02-10 PROCEDURE — 2550000001 HC RX 255 CONTRASTS: Performed by: INTERNAL MEDICINE

## 2024-02-10 PROCEDURE — 70553 MRI BRAIN STEM W/O & W/DYE: CPT | Performed by: RADIOLOGY

## 2024-02-10 PROCEDURE — 70553 MRI BRAIN STEM W/O & W/DYE: CPT

## 2024-02-10 PROCEDURE — A9575 INJ GADOTERATE MEGLUMI 0.1ML: HCPCS | Performed by: INTERNAL MEDICINE

## 2024-02-10 RX ORDER — GADOTERATE MEGLUMINE 376.9 MG/ML
15 INJECTION INTRAVENOUS
Status: COMPLETED | OUTPATIENT
Start: 2024-02-10 | End: 2024-02-10

## 2024-02-10 RX ADMIN — GADOTERATE MEGLUMINE 15 ML: 376.9 INJECTION INTRAVENOUS at 12:37

## 2024-02-12 ENCOUNTER — HOSPITAL ENCOUNTER (OUTPATIENT)
Dept: RADIOLOGY | Facility: HOSPITAL | Age: 74
Discharge: HOME | End: 2024-02-12
Payer: MEDICARE

## 2024-02-12 VITALS
DIASTOLIC BLOOD PRESSURE: 57 MMHG | RESPIRATION RATE: 18 BRPM | SYSTOLIC BLOOD PRESSURE: 114 MMHG | OXYGEN SATURATION: 96 % | HEART RATE: 93 BPM | TEMPERATURE: 98.6 F

## 2024-02-12 DIAGNOSIS — C34.11 SQUAMOUS CELL CARCINOMA OF UPPER LOBE OF RIGHT LUNG (MULTI): ICD-10-CM

## 2024-02-12 PROCEDURE — C1788 PORT, INDWELLING, IMP: HCPCS

## 2024-02-12 PROCEDURE — 76937 US GUIDE VASCULAR ACCESS: CPT

## 2024-02-12 PROCEDURE — 99152 MOD SED SAME PHYS/QHP 5/>YRS: CPT | Performed by: RADIOLOGY

## 2024-02-12 PROCEDURE — 36561 INSERT TUNNELED CV CATH: CPT | Performed by: RADIOLOGY

## 2024-02-12 PROCEDURE — 2500000004 HC RX 250 GENERAL PHARMACY W/ HCPCS (ALT 636 FOR OP/ED): Performed by: RADIOLOGY

## 2024-02-12 PROCEDURE — 99153 MOD SED SAME PHYS/QHP EA: CPT

## 2024-02-12 PROCEDURE — 77001 FLUOROGUIDE FOR VEIN DEVICE: CPT | Performed by: RADIOLOGY

## 2024-02-12 PROCEDURE — 2780000003 HC OR 278 NO HCPCS

## 2024-02-12 PROCEDURE — 99152 MOD SED SAME PHYS/QHP 5/>YRS: CPT

## 2024-02-12 PROCEDURE — 2500000005 HC RX 250 GENERAL PHARMACY W/O HCPCS: Performed by: RADIOLOGY

## 2024-02-12 PROCEDURE — 76937 US GUIDE VASCULAR ACCESS: CPT | Performed by: RADIOLOGY

## 2024-02-12 PROCEDURE — 99153 MOD SED SAME PHYS/QHP EA: CPT | Performed by: RADIOLOGY

## 2024-02-12 PROCEDURE — 77001 FLUOROGUIDE FOR VEIN DEVICE: CPT

## 2024-02-12 PROCEDURE — 36561 INSERT TUNNELED CV CATH: CPT

## 2024-02-12 RX ORDER — MIDAZOLAM HYDROCHLORIDE 1 MG/ML
INJECTION INTRAMUSCULAR; INTRAVENOUS
Status: COMPLETED | OUTPATIENT
Start: 2024-02-12 | End: 2024-02-12

## 2024-02-12 RX ORDER — VANCOMYCIN HYDROCHLORIDE 500 MG/100ML
500 INJECTION, SOLUTION INTRAVENOUS ONCE
Status: COMPLETED | OUTPATIENT
Start: 2024-02-12 | End: 2024-02-12

## 2024-02-12 RX ORDER — FENTANYL CITRATE 50 UG/ML
INJECTION, SOLUTION INTRAMUSCULAR; INTRAVENOUS
Status: COMPLETED | OUTPATIENT
Start: 2024-02-12 | End: 2024-02-12

## 2024-02-12 RX ORDER — SODIUM CHLORIDE 9 MG/ML
INJECTION, SOLUTION INTRAVENOUS CONTINUOUS PRN
Status: COMPLETED | OUTPATIENT
Start: 2024-02-12 | End: 2024-02-12

## 2024-02-12 RX ADMIN — VANCOMYCIN HYDROCHLORIDE 500 MG: 500 INJECTION, SOLUTION INTRAVENOUS at 13:30

## 2024-02-12 RX ADMIN — SODIUM CHLORIDE 50 ML/HR: 9 INJECTION, SOLUTION INTRAVENOUS at 12:50

## 2024-02-12 RX ADMIN — FENTANYL CITRATE 50 MCG: 50 INJECTION, SOLUTION INTRAMUSCULAR; INTRAVENOUS at 13:00

## 2024-02-12 RX ADMIN — Medication: at 13:10

## 2024-02-12 RX ADMIN — MIDAZOLAM HYDROCHLORIDE 0.5 MG: 1 INJECTION, SOLUTION INTRAMUSCULAR; INTRAVENOUS at 13:00

## 2024-02-12 ASSESSMENT — PAIN - FUNCTIONAL ASSESSMENT
PAIN_FUNCTIONAL_ASSESSMENT: 0-10

## 2024-02-12 ASSESSMENT — PAIN SCALES - GENERAL

## 2024-02-12 NOTE — Clinical Note
Procedure complete; dr. Sheldon combs with 500mg of vanco. Bag almost complete; steri strips intact with telfa with tegaderm over site; no bleeding noted; patient tolerated well

## 2024-02-12 NOTE — PRE-PROCEDURE NOTE
Interventional Radiology Preprocedure Note    Indication for procedure: The encounter diagnosis was Squamous cell carcinoma of upper lobe of right lung (CMS/HCC).    Relevant review of systems: NA    Relevant Labs:   Lab Results   Component Value Date    CREATININE 1.41 (H) 01/24/2024    EGFR 39 (L) 01/24/2024    INR 1.3 (H) 11/25/2023    PROTIME 15.0 (H) 11/25/2023       Planned Sedation/Anesthesia: Moderate    Airway assessment: normal    Directed physical examination:    RRR, lungs CTA-Bilat    Mallampati: III (soft and hard palate and base of uvula visible)    ASA Score: ASA 3 - Patient with moderate systemic disease with functional limitations    Benefits, risks and alternatives of procedure and planned sedation have been discussed with the patient and/or their representative. All questions answered and they agree to proceed.

## 2024-02-12 NOTE — Clinical Note
Patient in room for ir procedure; vanco ordered per dr. Chung; positioned on the table; no complaints

## 2024-02-12 NOTE — POST-PROCEDURE NOTE
Interventional Radiology Brief Postprocedure Note    Attending: Stepan Chung MD    Assistant:   Staff Role   Stepan Chung MD Radiologist   Twan Link RN Radiology Nurse       Diagnosis:   1. Squamous cell carcinoma of upper lobe of right lung (CMS/HCC)  IR CVC port placement    IR CVC port placement          Description of procedure: right chest port placement.    Timeout:  Yes    Procedure Area: Procedure Area     Anesthesia:   Conscious Sedation    Complications: None    Estimated Blood Loss: minimal    Medications (Filter: Administrations occurring from 1250 to 1330 on 02/12/24) As of 02/12/24 1330      vancomycin (Vancocin) 400 mg in dextrose 5 % in water (D5W) 500 mL IV (mg) Total dose:  0 mg*   *Administration not included in total     Date/Time Rate/Dose/Volume Action       02/12/24  1311 *400 mg - 254 mL/hr (over 120 min) Missed               sodium chloride 0.9% infusion (mL/hr) Total volume:  Not documented*   *Total volume has not been documented. View each administration to see the amount administered.     Date/Time Rate/Dose/Volume Action       02/12/24  1250 50 mL/hr New Bag      1328  Stopped               oxygen (O2) therapy Total dose:  Cannot be calculated*   *Administration does not have dose documented     Date/Time Rate/Dose/Volume Action       02/12/24  1310  Start               fentaNYL PF (Sublimaze) injection (mcg) Total dose:  50 mcg      Date/Time Rate/Dose/Volume Action       02/12/24  1300 50 mcg Given               midazolam (Versed) injection (mg) Total dose:  0.5 mg      Date/Time Rate/Dose/Volume Action       02/12/24  1300 0.5 mg Given               vancomycin in dextrose 5 % (Vancocin) IVPB 500 mg (mL/hr) Total volume:  Not documented*   *Total volume has not been documented. View each administration to see the amount administered.     Date/Time Rate/Dose/Volume Action       02/12/24  1330 500 mg - 200 mL/hr (over 30 min) New Bag                   No specimens  collected      See detailed result report with images in PACS.    The patient tolerated the procedure well without incident or complication and is in stable condition.

## 2024-02-14 ENCOUNTER — APPOINTMENT (OUTPATIENT)
Dept: HEMATOLOGY/ONCOLOGY | Facility: CLINIC | Age: 74
End: 2024-02-14
Payer: MEDICARE

## 2024-02-15 ENCOUNTER — APPOINTMENT (OUTPATIENT)
Dept: HEMATOLOGY/ONCOLOGY | Facility: CLINIC | Age: 74
End: 2024-02-15
Payer: MEDICARE

## 2024-02-15 DIAGNOSIS — C34.31 SQUAMOUS CELL CARCINOMA OF LOWER LOBE OF RIGHT LUNG (MULTI): Primary | ICD-10-CM

## 2024-02-15 RX ORDER — ONDANSETRON HYDROCHLORIDE 8 MG/1
8 TABLET, FILM COATED ORAL EVERY 8 HOURS PRN
Qty: 30 TABLET | Refills: 2 | Status: SHIPPED | OUTPATIENT
Start: 2024-02-15

## 2024-02-15 RX ORDER — PROCHLORPERAZINE MALEATE 10 MG
10 TABLET ORAL EVERY 6 HOURS PRN
Qty: 30 TABLET | Refills: 2 | Status: SHIPPED | OUTPATIENT
Start: 2024-02-15

## 2024-02-16 ENCOUNTER — INFUSION (OUTPATIENT)
Dept: HEMATOLOGY/ONCOLOGY | Facility: CLINIC | Age: 74
End: 2024-02-16
Payer: MEDICARE

## 2024-02-16 ENCOUNTER — OFFICE VISIT (OUTPATIENT)
Dept: HEMATOLOGY/ONCOLOGY | Facility: CLINIC | Age: 74
End: 2024-02-16
Payer: MEDICARE

## 2024-02-16 VITALS
RESPIRATION RATE: 20 BRPM | DIASTOLIC BLOOD PRESSURE: 61 MMHG | BODY MASS INDEX: 31.15 KG/M2 | SYSTOLIC BLOOD PRESSURE: 115 MMHG | HEART RATE: 90 BPM | OXYGEN SATURATION: 95 % | WEIGHT: 165 LBS | TEMPERATURE: 96.8 F | HEIGHT: 61 IN

## 2024-02-16 DIAGNOSIS — C34.31 SQUAMOUS CELL CARCINOMA OF LOWER LOBE OF RIGHT LUNG (MULTI): ICD-10-CM

## 2024-02-16 LAB
ALBUMIN SERPL BCP-MCNC: 3.4 G/DL (ref 3.4–5)
ALP SERPL-CCNC: 49 U/L (ref 33–136)
ALT SERPL W P-5'-P-CCNC: 5 U/L (ref 7–45)
ANION GAP SERPL CALC-SCNC: 16 MMOL/L (ref 10–20)
AST SERPL W P-5'-P-CCNC: 7 U/L (ref 9–39)
BASOPHILS # BLD AUTO: 0.08 X10*3/UL (ref 0–0.1)
BASOPHILS NFR BLD AUTO: 0.8 %
BILIRUB SERPL-MCNC: 0.4 MG/DL (ref 0–1.2)
BUN SERPL-MCNC: 13 MG/DL (ref 6–23)
CALCIUM SERPL-MCNC: 11.1 MG/DL (ref 8.6–10.3)
CHLORIDE SERPL-SCNC: 93 MMOL/L (ref 98–107)
CO2 SERPL-SCNC: 29 MMOL/L (ref 21–32)
CORTIS AM PEAK SERPL-MSCNC: 29 UG/DL (ref 5–20)
CREAT SERPL-MCNC: 1.17 MG/DL (ref 0.5–1.05)
EGFRCR SERPLBLD CKD-EPI 2021: 48 ML/MIN/1.73M*2
EOSINOPHIL # BLD AUTO: 0.56 X10*3/UL (ref 0–0.4)
EOSINOPHIL NFR BLD AUTO: 5.5 %
ERYTHROCYTE [DISTWIDTH] IN BLOOD BY AUTOMATED COUNT: 16.6 % (ref 11.5–14.5)
GLUCOSE SERPL-MCNC: 230 MG/DL (ref 74–99)
HBV CORE AB SER QL: NONREACTIVE
HBV SURFACE AB SER-ACNC: <3.1 MIU/ML
HBV SURFACE AG SERPL QL IA: NONREACTIVE
HCT VFR BLD AUTO: 30 % (ref 36–46)
HGB BLD-MCNC: 9 G/DL (ref 12–16)
IMM GRANULOCYTES # BLD AUTO: 0.08 X10*3/UL (ref 0–0.5)
IMM GRANULOCYTES NFR BLD AUTO: 0.8 % (ref 0–0.9)
LYMPHOCYTES # BLD AUTO: 0.96 X10*3/UL (ref 0.8–3)
LYMPHOCYTES NFR BLD AUTO: 9.4 %
MCH RBC QN AUTO: 24.6 PG (ref 26–34)
MCHC RBC AUTO-ENTMCNC: 30 G/DL (ref 32–36)
MCV RBC AUTO: 82 FL (ref 80–100)
MONOCYTES # BLD AUTO: 0.73 X10*3/UL (ref 0.05–0.8)
MONOCYTES NFR BLD AUTO: 7.1 %
NEUTROPHILS # BLD AUTO: 7.8 X10*3/UL (ref 1.6–5.5)
NEUTROPHILS NFR BLD AUTO: 76.4 %
NRBC BLD-RTO: 0 /100 WBCS (ref 0–0)
PLATELET # BLD AUTO: 278 X10*3/UL (ref 150–450)
POTASSIUM SERPL-SCNC: 2.6 MMOL/L (ref 3.5–5.3)
PROT SERPL-MCNC: 7.2 G/DL (ref 6.4–8.2)
RBC # BLD AUTO: 3.66 X10*6/UL (ref 4–5.2)
SODIUM SERPL-SCNC: 135 MMOL/L (ref 136–145)
TSH SERPL-ACNC: 0.54 MIU/L (ref 0.44–3.98)
WBC # BLD AUTO: 10.2 X10*3/UL (ref 4.4–11.3)

## 2024-02-16 PROCEDURE — 36591 DRAW BLOOD OFF VENOUS DEVICE: CPT

## 2024-02-16 PROCEDURE — 2500000004 HC RX 250 GENERAL PHARMACY W/ HCPCS (ALT 636 FOR OP/ED): Performed by: INTERNAL MEDICINE

## 2024-02-16 PROCEDURE — 99214 OFFICE O/P EST MOD 30 MIN: CPT | Performed by: INTERNAL MEDICINE

## 2024-02-16 PROCEDURE — 87340 HEPATITIS B SURFACE AG IA: CPT | Mod: PARLAB

## 2024-02-16 PROCEDURE — 96523 IRRIG DRUG DELIVERY DEVICE: CPT

## 2024-02-16 PROCEDURE — 1125F AMNT PAIN NOTED PAIN PRSNT: CPT | Performed by: INTERNAL MEDICINE

## 2024-02-16 PROCEDURE — 86706 HEP B SURFACE ANTIBODY: CPT | Mod: PARLAB

## 2024-02-16 PROCEDURE — 1159F MED LIST DOCD IN RCRD: CPT | Performed by: INTERNAL MEDICINE

## 2024-02-16 PROCEDURE — 82024 ASSAY OF ACTH: CPT

## 2024-02-16 PROCEDURE — 82533 TOTAL CORTISOL: CPT | Mod: PARLAB

## 2024-02-16 PROCEDURE — 80053 COMPREHEN METABOLIC PANEL: CPT

## 2024-02-16 PROCEDURE — 85025 COMPLETE CBC W/AUTO DIFF WBC: CPT

## 2024-02-16 PROCEDURE — 84443 ASSAY THYROID STIM HORMONE: CPT

## 2024-02-16 PROCEDURE — 86704 HEP B CORE ANTIBODY TOTAL: CPT | Mod: PARLAB

## 2024-02-16 RX ORDER — HEPARIN SODIUM,PORCINE/PF 10 UNIT/ML
50 SYRINGE (ML) INTRAVENOUS AS NEEDED
Status: CANCELLED | OUTPATIENT
Start: 2024-02-16

## 2024-02-16 RX ORDER — HEPARIN 100 UNIT/ML
500 SYRINGE INTRAVENOUS AS NEEDED
Status: CANCELLED | OUTPATIENT
Start: 2024-02-16

## 2024-02-16 RX ORDER — HEPARIN 100 UNIT/ML
500 SYRINGE INTRAVENOUS AS NEEDED
Status: DISCONTINUED | OUTPATIENT
Start: 2024-02-16 | End: 2024-02-16 | Stop reason: HOSPADM

## 2024-02-16 RX ADMIN — HEPARIN 500 UNITS: 100 SYRINGE at 15:04

## 2024-02-16 ASSESSMENT — PAIN SCALES - GENERAL: PAINLEVEL: 10-WORST PAIN EVER

## 2024-02-16 NOTE — PROGRESS NOTES
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  TP53 mutated.        a. Ist line therapy with carboplatin/Taxol/Keytruda beginning Feb 2024.    CHIEF COMPLAINTS:  Patient is in the clinic today for evaluation of right lung squamous cell carcinoma and for continuation of the chemotherapy and management of therapy-related effects. .    HISTORY:  Janee Maddox is a 74 y.o. female with right lung squamous cell carcinoma.  She is a lifelong smoker and was admitted at [facility] in Nov 2023 with complaining of shortness of breath, chest discomfort and positive bacteremia.  During the evaluation she was noted to have a large cavitary lesion in the right lung along with pleural effusion.  CT scan at that time showed a 6.1 x 5 cm right lower lobe cavitary lung mass along with loculated right-sided pleural effusion and prominent mediastinal and hilar lymphadenopathy.  Overall findings are concerning for either abscess or baseline malignancy.  She was transferred to [facility] where a CT-guided biopsy confirmed Invasive squamous cell carcinoma.  PD-L1 expression was 5%.  Tumor NGS panel was mainly suggestive of TP53 mutation.  During hospitalization her clinical course was also complicated by Pantoea bacteremia and an ESBL Ecoli UTI.  She was treated with antibiotics and other supportive care.  A follow-up PET scan from Jan 2024 confirmed increased metabolic activity in the right lower lobe pleural-based cavitary mass along with mediastinal and right hilar lymphadenopathy and right-sided pleural disease consistent with known malignancy.  There was no evidence of distant metastatic disease.    INTERVAL HISTORY:  She is complaining of worsening shortness of breath and right-sided chest pain.  She is also having  "issues with increased urinary frequency.  Since her last visit she also had a brain MRI which showed 3 mm focus of enhancement within the posterior frontal lobe concerning for metastatic lesion.    PAST MEDICAL HISTORY:  1.  Right lower lobe squamous cell lung cancer as detailed above.  2.  Coronary artery disease  3.  Hypertension  4.  Hyperlipidemia  5.  GERD  6.  Anxiety/depression  7.  History of C. difficile colitis  8.  E. coli UTI.  9.  History of complete hysterectomy, cholecystectomy and incisional hernia surgeries    SOCIAL HISTORY:  She lives alone in [place].  Quit smoking in 2023.  1 pack a day for 60 years prior smoking history.  Admit to occasional alcohol intake.  She work as a tax preparor.  Born and raised in [place].    FAMILY HISTORY:  Father  at age 86 from coronary artery disease. Mother also  at age 86 from multiple medical issues.  She had 1 sister and 4/2 siblings.  One of them  from myocardial infarction.  3 children, 7 grandkids and 1 great grand kid ~ all alive and well.  No other family history of bleeding, clotting or malignant disorders in the family history.    REVIEW OF SYSTEMS:   Pertinent finding as per the history above.  All other systems have been reviewed and generally negative and noncontributory.    VITAL SIGNS  BSA: 1.79 meters squared  Resp 20   Ht 1.55 m (5' 1.02\")   Wt 74.8 kg (165 lb) Comment: stated, unable to stand  BMI 31.15 kg/m²     PHYSICAL EXAMINATION:  Detailed physical examination not done.    LAB DATA:  CBC from today shows a hemoglobin of 9.2 with MCV of 83.  White cell count is normal at 7.8 and platelets are 284 K.  Metabolic profile is unremarkable other than creatinine of 1.4.    UA from today shows a positive nitrite and leukocyte Estrace along with 1+ protein.  Follow-up urine cultures is pending.    RADIOLOGICAL DATA:  CT and PET scan results were reviewed and have been detailed in the history above.    Brain MRI " 02/10/2024  Impression:  There is a 3 mm focus of enhancement within the posterior frontal lobe on the left worrisome for an intracranial metastatic lesion. There is no associated mass effect. No midline shift.      There is a 1 mm focus of increased signal within the right parietal lobe on the diffusion-weighted images which may represent a recent infarct versus artifact. No definite associated enhancement.      Volume loss and mild degree of nonspecific white matter signal compatible with microangiopathy. 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    PATHOLOGY DATA:  Surgical Pathology Exam: [id number]   FINAL DIAGNOSIS   A. LUNG, RIGHT; BIOPSY:    -- INVASIVE SQUAMOUS CELL CARCINOMA, KERATINIZING. See comment   Electronically signed by Lino Carrillo MD on 12/1/2023      PD-L1 22C3  (KEYTRUDA)Tumor Proportion Score (TPS): 5% MICROSATELLITE STATUS: Microsatellite Instability-High (MSI-H) is NOT DETECTED.      DISEASE ASSOCIATED GENOMIC FINDINGS:   TP53 p.G245V ([id number] c.734G>T)     DISEASE RELEVANT ALTERATIONS NOT DETECTED:   Negative for ALK fusion.  Negative for BRAF V600E.  Negative for EGFR sensitizing mutation.  Negative for ERBB2 activating mutation  Negative for KRAS G12C.  Negative for MET exon 14 skipping mutation.  Negative for NTRK fusion.  Negative for RET fusion.  Negative for ROS1 fusion.    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  TP53 mutated.  Please have   further details of initial presentation and management as outlined above.  In summary patient is a lifelong smoker and was noted to have a large cavitary right lower lung pleural-based lesion along with extensive mediastinal and hilar lymphadenopathy and loculated pleural effusion.  CT-guided biopsy from November 2023 was confirmatory of invasive squamous cell carcinoma.  PD-L1 TPS score is 5%.  NGS panel was unremarkable other than finding of TP53 mutation.  Brain MRI showed a small 3 mm left frontal  lobe lesion concerning for metastatic disease.    Again, long discussion with the patient and she is explained about the diagnosis, stage, prognosis and treatment/management option.  In view of the pleural-based involvement she has stage IV disease and is not a surgical candidate. Main treatment option is systemic therapy with combination of chemotherapy and immunotherapy.  We will start her on combination of carboplatin/Taxol/Keytruda.  Probable benefit and side effect profile of mainly myelosuppression, hair loss, weakness, fatigue, neurotoxicity, nephrotoxicity, colitis, pneumonitis, endocrinopathies etc. were explained to them in detail.  Her overall prognosis is guarded.    2.  Brain mets.  MRI results are suggestive of small solid 3 left parietal lobe lesion.  There is no edema and clinically she is asymptomatic as far as brain mets is concerned.  I think for now we can monitor her closely.  Nonetheless I will try to schedule her for initial evaluation with our radiation therapy team for probable consideration for stereotactic radiation therapy in the future.    3.  Chest pain.  Most likely secondary to baseline malignancy.  She has prescription for both Norco and Percocet.  Depending upon the response and tolerability we will be continuing with 1 of those agents.  In the future she may also benefit from evaluation and follow-up with our palliative care team    3.  Anxiety/depression.  Patient seems to have significant baseline anxiety. I think she will also benefit from psycho-oncology support.    4.  Follow-up.  She will a follow-up visit in about 3 weeks.  In the meantime she will come to the clinic for start of chemotherapy as detailed above.      This note has been transcribed using Dragon voice recognition system and there is a possibility of unintentional typing misprints.

## 2024-02-16 NOTE — SIGNIFICANT EVENT
Brand new port placed this week per pt. Tegaderm & gauze drsg removed to access the port, area very tender for pt. Steri-strip dry & intact to upper site from port. Pt instructed on use of Aspercreme to port for trt next Monday.

## 2024-02-19 ENCOUNTER — SOCIAL WORK (OUTPATIENT)
Dept: HEMATOLOGY/ONCOLOGY | Facility: CLINIC | Age: 74
End: 2024-02-19
Payer: MEDICARE

## 2024-02-19 ENCOUNTER — INFUSION (OUTPATIENT)
Dept: HEMATOLOGY/ONCOLOGY | Facility: CLINIC | Age: 74
End: 2024-02-19
Payer: MEDICARE

## 2024-02-19 VITALS
HEART RATE: 88 BPM | OXYGEN SATURATION: 92 % | DIASTOLIC BLOOD PRESSURE: 72 MMHG | TEMPERATURE: 97.3 F | WEIGHT: 162.26 LBS | BODY MASS INDEX: 30.63 KG/M2 | RESPIRATION RATE: 18 BRPM | SYSTOLIC BLOOD PRESSURE: 143 MMHG

## 2024-02-19 DIAGNOSIS — C34.31 SQUAMOUS CELL CARCINOMA OF LOWER LOBE OF RIGHT LUNG (MULTI): Primary | ICD-10-CM

## 2024-02-19 LAB — ACTH PLAS-MCNC: 26.8 PG/ML (ref 7.2–63.3)

## 2024-02-19 PROCEDURE — 96375 TX/PRO/DX INJ NEW DRUG ADDON: CPT | Mod: INF

## 2024-02-19 PROCEDURE — 2500000001 HC RX 250 WO HCPCS SELF ADMINISTERED DRUGS (ALT 637 FOR MEDICARE OP): Performed by: INTERNAL MEDICINE

## 2024-02-19 PROCEDURE — 96367 TX/PROPH/DG ADDL SEQ IV INF: CPT

## 2024-02-19 PROCEDURE — 96366 THER/PROPH/DIAG IV INF ADDON: CPT | Mod: INF

## 2024-02-19 PROCEDURE — 2500000004 HC RX 250 GENERAL PHARMACY W/ HCPCS (ALT 636 FOR OP/ED): Performed by: INTERNAL MEDICINE

## 2024-02-19 PROCEDURE — 2500000004 HC RX 250 GENERAL PHARMACY W/ HCPCS (ALT 636 FOR OP/ED): Mod: JZ | Performed by: INTERNAL MEDICINE

## 2024-02-19 PROCEDURE — 2500000002 HC RX 250 W HCPCS SELF ADMINISTERED DRUGS (ALT 637 FOR MEDICARE OP, ALT 636 FOR OP/ED): Performed by: INTERNAL MEDICINE

## 2024-02-19 PROCEDURE — 96415 CHEMO IV INFUSION ADDL HR: CPT

## 2024-02-19 PROCEDURE — 96417 CHEMO IV INFUS EACH ADDL SEQ: CPT

## 2024-02-19 PROCEDURE — 96413 CHEMO IV INFUSION 1 HR: CPT

## 2024-02-19 PROCEDURE — 96368 THER/DIAG CONCURRENT INF: CPT

## 2024-02-19 RX ORDER — HEPARIN SODIUM,PORCINE/PF 10 UNIT/ML
50 SYRINGE (ML) INTRAVENOUS AS NEEDED
Status: DISCONTINUED | OUTPATIENT
Start: 2024-02-19 | End: 2024-02-19 | Stop reason: HOSPADM

## 2024-02-19 RX ORDER — PROCHLORPERAZINE EDISYLATE 5 MG/ML
10 INJECTION INTRAMUSCULAR; INTRAVENOUS EVERY 6 HOURS PRN
Status: DISCONTINUED | OUTPATIENT
Start: 2024-02-19 | End: 2024-02-19 | Stop reason: HOSPADM

## 2024-02-19 RX ORDER — HEPARIN 100 UNIT/ML
500 SYRINGE INTRAVENOUS AS NEEDED
Status: DISCONTINUED | OUTPATIENT
Start: 2024-02-19 | End: 2024-02-19 | Stop reason: HOSPADM

## 2024-02-19 RX ORDER — DIPHENHYDRAMINE HYDROCHLORIDE 50 MG/ML
50 INJECTION INTRAMUSCULAR; INTRAVENOUS AS NEEDED
Status: DISCONTINUED | OUTPATIENT
Start: 2024-02-19 | End: 2024-02-19 | Stop reason: HOSPADM

## 2024-02-19 RX ORDER — PALONOSETRON 0.05 MG/ML
0.25 INJECTION, SOLUTION INTRAVENOUS ONCE
Status: COMPLETED | OUTPATIENT
Start: 2024-02-19 | End: 2024-02-19

## 2024-02-19 RX ORDER — HEPARIN SODIUM,PORCINE/PF 10 UNIT/ML
50 SYRINGE (ML) INTRAVENOUS AS NEEDED
Status: CANCELLED | OUTPATIENT
Start: 2024-02-19

## 2024-02-19 RX ORDER — POTASSIUM CHLORIDE 750 MG/1
20 TABLET, FILM COATED, EXTENDED RELEASE ORAL ONCE
Status: COMPLETED | OUTPATIENT
Start: 2024-02-19 | End: 2024-02-19

## 2024-02-19 RX ORDER — ALBUTEROL SULFATE 0.83 MG/ML
3 SOLUTION RESPIRATORY (INHALATION) AS NEEDED
Status: DISCONTINUED | OUTPATIENT
Start: 2024-02-19 | End: 2024-02-19 | Stop reason: HOSPADM

## 2024-02-19 RX ORDER — PROCHLORPERAZINE MALEATE 10 MG
10 TABLET ORAL EVERY 6 HOURS PRN
Status: DISCONTINUED | OUTPATIENT
Start: 2024-02-19 | End: 2024-02-19 | Stop reason: HOSPADM

## 2024-02-19 RX ORDER — GRANULES FOR ORAL 3 G/1
3 POWDER ORAL ONCE
Qty: 3 G | Refills: 0 | Status: SHIPPED | OUTPATIENT
Start: 2024-02-19 | End: 2024-02-22 | Stop reason: SDUPTHER

## 2024-02-19 RX ORDER — EPINEPHRINE 0.3 MG/.3ML
0.3 INJECTION SUBCUTANEOUS EVERY 5 MIN PRN
Status: DISCONTINUED | OUTPATIENT
Start: 2024-02-19 | End: 2024-02-19 | Stop reason: HOSPADM

## 2024-02-19 RX ORDER — DIPHENHYDRAMINE HCL 50 MG
50 CAPSULE ORAL ONCE
Status: COMPLETED | OUTPATIENT
Start: 2024-02-19 | End: 2024-02-19

## 2024-02-19 RX ORDER — FAMOTIDINE 10 MG/ML
20 INJECTION INTRAVENOUS ONCE
Status: COMPLETED | OUTPATIENT
Start: 2024-02-19 | End: 2024-02-19

## 2024-02-19 RX ORDER — FAMOTIDINE 10 MG/ML
20 INJECTION INTRAVENOUS ONCE AS NEEDED
Status: DISCONTINUED | OUTPATIENT
Start: 2024-02-19 | End: 2024-02-19 | Stop reason: HOSPADM

## 2024-02-19 RX ORDER — POTASSIUM CHLORIDE 1.5 G/1.58G
20 POWDER, FOR SOLUTION ORAL 2 TIMES DAILY
Status: CANCELLED | OUTPATIENT
Start: 2024-02-19

## 2024-02-19 RX ORDER — POTASSIUM CHLORIDE 1.5 G/1.58G
20 POWDER, FOR SOLUTION ORAL 2 TIMES DAILY
Status: DISCONTINUED | OUTPATIENT
Start: 2024-02-19 | End: 2024-02-19 | Stop reason: ENTERED-IN-ERROR

## 2024-02-19 RX ORDER — ALBUTEROL SULFATE 90 UG/1
2 AEROSOL, METERED RESPIRATORY (INHALATION) AS NEEDED
Qty: 18 G | Refills: 2 | Status: SHIPPED | OUTPATIENT
Start: 2024-02-19

## 2024-02-19 RX ORDER — HEPARIN 100 UNIT/ML
500 SYRINGE INTRAVENOUS AS NEEDED
Status: CANCELLED | OUTPATIENT
Start: 2024-02-19

## 2024-02-19 RX ADMIN — POTASSIUM CHLORIDE 20 MEQ: 750 TABLET, EXTENDED RELEASE ORAL at 09:16

## 2024-02-19 RX ADMIN — DEXAMETHASONE SODIUM PHOSPHATE 20 MG: 4 INJECTION, SOLUTION INTRA-ARTICULAR; INTRALESIONAL; INTRAMUSCULAR; INTRAVENOUS; SOFT TISSUE at 09:27

## 2024-02-19 RX ADMIN — SODIUM CHLORIDE 150 MG: 9 INJECTION, SOLUTION INTRAVENOUS at 09:50

## 2024-02-19 RX ADMIN — SODIUM CHLORIDE 200 MG: 9 INJECTION, SOLUTION INTRAVENOUS at 10:23

## 2024-02-19 RX ADMIN — CARBOPLATIN 320 MG: 10 INJECTION, SOLUTION INTRAVENOUS at 14:14

## 2024-02-19 RX ADMIN — PALONOSETRON 250 MCG: 0.05 INJECTION, SOLUTION INTRAVENOUS at 10:19

## 2024-02-19 RX ADMIN — DEXTROSE MONOHYDRATE 366 MG: 50 INJECTION, SOLUTION INTRAVENOUS at 11:00

## 2024-02-19 RX ADMIN — DIPHENHYDRAMINE HYDROCHLORIDE 50 MG: 50 CAPSULE ORAL at 10:18

## 2024-02-19 RX ADMIN — POTASSIUM CHLORIDE 40 MEQ: 149 INJECTION, SOLUTION, CONCENTRATE INTRAVENOUS at 09:32

## 2024-02-19 RX ADMIN — FAMOTIDINE 20 MG: 10 INJECTION INTRAVENOUS at 10:19

## 2024-02-19 ASSESSMENT — COLUMBIA-SUICIDE SEVERITY RATING SCALE - C-SSRS
6. HAVE YOU EVER DONE ANYTHING, STARTED TO DO ANYTHING, OR PREPARED TO DO ANYTHING TO END YOUR LIFE?: NO
2. HAVE YOU ACTUALLY HAD ANY THOUGHTS OF KILLING YOURSELF?: NO
1. IN THE PAST MONTH, HAVE YOU WISHED YOU WERE DEAD OR WISHED YOU COULD GO TO SLEEP AND NOT WAKE UP?: NO

## 2024-02-19 ASSESSMENT — PAIN SCALES - GENERAL: PAINLEVEL: 6

## 2024-02-19 ASSESSMENT — PATIENT HEALTH QUESTIONNAIRE - PHQ9
SUM OF ALL RESPONSES TO PHQ9 QUESTIONS 1 AND 2: 0
2. FEELING DOWN, DEPRESSED OR HOPELESS: NOT AT ALL
1. LITTLE INTEREST OR PLEASURE IN DOING THINGS: NOT AT ALL

## 2024-02-19 NOTE — SIGNIFICANT EVENT
02/19/24 0903   Prechemo Checklist   Has the patient been in the hospital, ED, or urgent care since last date of service No   Chemo/Immuno Consent Signed Yes   Protocol/Indications Verified Yes   Confirmed to previous date/time of medication Yes   Compared to previous dose Yes   All medications are dated accurately Yes   Pregnancy Test Negative Not applicable   Parameters Met Yes   BSA/Weight-Height Verified Yes   Dose Calculations Verified Yes

## 2024-02-19 NOTE — PROGRESS NOTES
ONCOLOGY/HEMATOLOGY PSYCHOSOCIAL ASSESSMENT     Demographic Information  Janee Maddox  1950  [id number]  Assessment Type:    Date of assessment: 02/19/24  Person(s) present during assessment: Pt and her dtr Ronna  Did patient participate in assessment: yes  If no, why not:  Primary language: English  Interpretive services used:   Medical Oncologist: Dru  Diagnosis:squamous cell carcinoma R lung                Marital Status / Family / Household  Status:    Family composition: lives alone  Support systems: dtr and family  Primary caregiver:  self  Current employment status:  employed  Work history/type of work:   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     Environmental Supports  Current living situation:   apartment  Patient's home environment: lives alone, only one step to get into apt building, laundry in own apt.     Functional Status   Functional status:  Minimum assistance   Other health issues that the patient is experiencing: fatigue and weakness  What supports are in place to assist the patient: Pt is not using any DME at this time  What community resources have been offered: [facility] and [facility] numbers  Transportation: dtr driving  Psychosocial risk factors impacting the patient:   Pt adjusting to dx and treatment     Assistive Devices  Patient has the following equipment:   Patient currently ambulates:  independently when she is able     Finances/Insurance  Primary insurance: [employer]  Patient's current income source:  continues to work and social security  Does the patient have any financial concerns:  Pt lives on a fixed income per pt    Comments:      Advance Directives  None on file     Mental Health  Mental health history and status details:  anxiety and panic attacks  What is the patient doing to manage their mental health: wellburtrin and ativan  Does the patient find this treatment effective for managing  their mental health issues:  at this time  What does the patient do for enjoyment: 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    Depression Screen        Social Determinants of Health     Tobacco Use: Medium Risk (2/16/2024)    Patient History     Smoking Tobacco Use: Former     Smokeless Tobacco Use: Unknown     Passive Exposure: Not on file   Alcohol Use: Not At Risk (11/25/2023)    AUDIT-C     Frequency of Alcohol Consumption: Never     Average Number of Drinks: Patient does not drink     Frequency of Binge Drinking: Never   Financial Resource Strain: High Risk (11/25/2023)    Overall Financial Resource Strain (CARDIA)     Difficulty of Paying Living Expenses: Hard   Food Insecurity: Not on file   Transportation Needs: No Transportation Needs (11/25/2023)    PRAPARE - Transportation     Lack of Transportation (Medical): No     Lack of Transportation (Non-Medical): No   Physical Activity: Not on file   Stress: Not on file   Social Connections: Not on file   Intimate Partner Violence: Not on file   Depression: Not at risk (2/19/2024)    PHQ-2     PHQ-2 Score: 0   Housing Stability: Low Risk  (11/25/2023)    Housing Stability Vital Sign     Unable to Pay for Housing in the Last Year: No     Number of Places Lived in the Last Year: 1     Unstable Housing in the Last Year: No   Utilities: Not on file   Digital Equity: Not on file       Assessment/Plan    Pt is a 73 y/o female dx with squamous cell R lung cancer. Pt starting treatment today keytruda, taxol and carbo. Pt is having some worry but seems to be ready to start. Pt's dtr is here with her and trying to go over to pt's apt a few times a week to help and ensure she has her groceries. Pt is complaining of the taste of food even prior to starting treatment. Onc LISA met pt and dtr prior to this visit at office visit. At this time dtr is still trying to find help in the home in [facility]. Uncertain if pt will qualify for medicaid but  per pt she is on a fixed income. Dtr wondering if passport could help. At this time pt is able to care for adls and manage at her apt which is all on one level including the laundry in her own apt. Dtr has been trying to sign pt up for medicaid on line but is having no luck. Will assist pt in signing up for medicaid and snap to see if she qualifies for anything at this time. They both have Onc LISW-S card.

## 2024-02-20 DIAGNOSIS — E87.6 HYPOKALEMIA: Primary | ICD-10-CM

## 2024-02-20 RX ORDER — POTASSIUM CHLORIDE 20 MEQ/1
20 TABLET, EXTENDED RELEASE ORAL 2 TIMES DAILY
Qty: 60 TABLET | Refills: 3 | Status: SHIPPED | OUTPATIENT
Start: 2024-02-20 | End: 2024-03-21

## 2024-02-21 ENCOUNTER — TELEPHONE (OUTPATIENT)
Dept: PALLIATIVE MEDICINE | Facility: CLINIC | Age: 74
End: 2024-02-21
Payer: MEDICARE

## 2024-02-21 ENCOUNTER — OFFICE VISIT (OUTPATIENT)
Dept: PALLIATIVE MEDICINE | Facility: CLINIC | Age: 74
End: 2024-02-21
Payer: MEDICARE

## 2024-02-21 ENCOUNTER — LAB (OUTPATIENT)
Dept: LAB | Facility: CLINIC | Age: 74
End: 2024-02-21
Payer: MEDICARE

## 2024-02-21 ENCOUNTER — DOCUMENTATION (OUTPATIENT)
Dept: PALLIATIVE MEDICINE | Facility: HOSPITAL | Age: 74
End: 2024-02-21
Payer: MEDICARE

## 2024-02-21 ENCOUNTER — APPOINTMENT (OUTPATIENT)
Dept: PALLIATIVE MEDICINE | Facility: CLINIC | Age: 74
End: 2024-02-21
Payer: MEDICARE

## 2024-02-21 VITALS
HEART RATE: 94 BPM | BODY MASS INDEX: 30.47 KG/M2 | RESPIRATION RATE: 16 BRPM | WEIGHT: 161.38 LBS | DIASTOLIC BLOOD PRESSURE: 62 MMHG | OXYGEN SATURATION: 95 % | TEMPERATURE: 97.7 F | SYSTOLIC BLOOD PRESSURE: 107 MMHG

## 2024-02-21 DIAGNOSIS — Z51.81 ENCOUNTER FOR MONITORING OPIOID MAINTENANCE THERAPY: Primary | ICD-10-CM

## 2024-02-21 DIAGNOSIS — N30.00 ACUTE CYSTITIS WITHOUT HEMATURIA: ICD-10-CM

## 2024-02-21 DIAGNOSIS — Z79.891 ENCOUNTER FOR MONITORING OPIOID MAINTENANCE THERAPY: ICD-10-CM

## 2024-02-21 DIAGNOSIS — Z51.81 ENCOUNTER FOR MONITORING OPIOID MAINTENANCE THERAPY: ICD-10-CM

## 2024-02-21 DIAGNOSIS — Z79.891 ENCOUNTER FOR MONITORING OPIOID MAINTENANCE THERAPY: Primary | ICD-10-CM

## 2024-02-21 DIAGNOSIS — C34.11 SQUAMOUS CELL CARCINOMA OF UPPER LOBE OF RIGHT LUNG (MULTI): ICD-10-CM

## 2024-02-21 LAB
AMPHETAMINES UR QL SCN: NORMAL
BARBITURATES UR QL SCN: NORMAL
BENZODIAZ UR QL SCN: NORMAL
BZE UR QL SCN: NORMAL
CANNABINOIDS UR QL SCN: NORMAL
FENTANYL+NORFENTANYL UR QL SCN: NORMAL
OPIATES UR QL SCN: NORMAL
OXYCODONE+OXYMORPHONE UR QL SCN: NORMAL
PCP UR QL SCN: NORMAL

## 2024-02-21 PROCEDURE — 99215 OFFICE O/P EST HI 40 MIN: CPT

## 2024-02-21 PROCEDURE — 99205 OFFICE O/P NEW HI 60 MIN: CPT

## 2024-02-21 PROCEDURE — 80307 DRUG TEST PRSMV CHEM ANLYZR: CPT

## 2024-02-21 PROCEDURE — 1159F MED LIST DOCD IN RCRD: CPT

## 2024-02-21 PROCEDURE — 1126F AMNT PAIN NOTED NONE PRSNT: CPT

## 2024-02-21 ASSESSMENT — PAIN SCALES - GENERAL: PAINLEVEL: 0-NO PAIN

## 2024-02-21 NOTE — PROGRESS NOTES
SUPPORTIVE AND PALLIATIVE ONCOLOGY CONSULT - OUTPATIENT      SERVICE DATE: 2/21/2024    Referred by:  Dr. Gonsales  Medical Oncologist: MD Shalom Alvarenga MD   Radiation Oncologist: No care team member to display  Primary Physician: Frances Cervantes  [phone]    REASON FOR CONSULT/CHIEF CONSULT COMPLAINT: pain management    Subjective   HISTORY OF PRESENT ILLNESS: Janee Maddox is a 74 y.o. 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 Started treatment with Pembrolizumab, Paclitaxel, and Carboplatin 2/19.     Pain Assessment:  Location: abdomen and right side/mid-back  Description: throbbing pain that is constant. States that the pain has improved over the last 3 days and she has not required any pain medications. Continue to have pain r/t UTI symptoms.     Symptom Assessment:  Pain:somewhat  Headache: none  Dizziness:none  Lack of energy: a little  Difficulty sleeping: somewhat due to pain  Worrying: a little  Anxiety: a little  Depression: a little  Pain in mouth/swallowing: none  Dry mouth: none  Taste changes: a little  Shortness of breath: none  Lack of appetite: a little   Nausea: none  Vomiting: none  Constipation: a little  Diarrhea: none  Sore muscles: none  Numbness or tingling in hands/feet/other: none  Weight loss: none  Other:  UTI symptoms that are chronic including pain, burning, urgency and frequency. Has not followed up with urology recently.       Information obtained from: chart review, interview of patient, and interview of family  ______________________________________________________________________     Oncology History   Squamous cell carcinoma of lower lobe of right lung (CMS/HCC)   1/24/2024 Initial Diagnosis    Squamous cell carcinoma of lower lobe of right lung (CMS/HCC)     2/19/2024 -  Chemotherapy    Pembrolizumab + PACLitaxel / CARBOplatin, 21 Day Cycles         Past Medical History:    Diagnosis Date    Agoraphobia, unspecified 12/06/2016    Agoraphobia    Body mass index (BMI) 33.0-33.9, adult     BMI 33.0-33.9,adult    Chronic sinusitis, unspecified     Recurrent sinus infections    Cutaneous abscess, unspecified 06/01/2016    Abscess    Disruption of external operation (surgical) wound, not elsewhere classified, initial encounter 07/31/2017    Open abdominal incision with drainage    Diverticulitis of intestine, part unspecified, without perforation or abscess without bleeding 08/13/2018    Acute diverticulitis    Encounter for immunization 04/21/2021    Encounter for immunization    Encounter for screening for lipoid disorders 04/26/2016    Screening cholesterol level    Incisional hernia without obstruction or gangrene 09/11/2015    Incisional hernia    Infection following a procedure, other surgical site, initial encounter 04/20/2017    Incisional infection    Local infection of the skin and subcutaneous tissue, unspecified 05/01/2019    Skin infection    Other conditions influencing health status     Complete Colonoscopy    Other specified postprocedural states 05/15/2018    History of incision and drainage    Otitis media, unspecified, left ear 05/19/2014    Otitis media, left    Pain in right knee 02/16/2015    Bilateral knee pain    Personal history of other diseases of the circulatory system     History of hypertension    Personal history of other infectious and parasitic diseases 07/03/2018    History of onychomycosis    Personal history of other infectious and parasitic diseases 10/18/2019    History of Clostridioides difficile colitis    Personal history of other specified conditions 04/20/2017    History of dysuria    Personal history of other specified conditions 02/02/2017    History of nocturia    Personal history of other specified conditions 10/18/2019    History of diarrhea    Personal history of other specified conditions 12/06/2019    History of dysuria    Personal history  of other specified conditions 02/16/2015    History of dyspnea    Personal history of urinary (tract) infections 12/11/2019    History of urinary tract infection    Squamous cell carcinoma of lower lobe of right lung (CMS/HCC) 01/24/2024    Syncope and collapse 01/05/2018    Collapse    Tinnitus, left ear 08/29/2014    Tinnitus of left ear    Unspecified open wound of abdominal wall, unspecified quadrant without penetration into peritoneal cavity, initial encounter 08/23/2019    Wound, open, abdominal wall, anterior     Past Surgical History:   Procedure Laterality Date    CARPAL TUNNEL RELEASE  05/20/2014    Neuroplasty Decompression Median Nerve At Carpal Tunnel    CHOLECYSTECTOMY  05/20/2014    Cholecystectomy    COLONOSCOPY  01/08/2021    Complete Colonoscopy    CT GUIDED ASPIRATION OF ABSCESS, HEMATOMA, CYST  11/27/2023    CT GUIDED ASPIRATION OF ABSCESS, HEMATOMA, CYST 11/27/2023 PAR CT    CT GUIDED PERCUTANEOUS BIOPSY LUNG  11/27/2023    CT GUIDED PERCUTANEOUS BIOPSY LUNG 11/27/2023 PAR CT    HYSTERECTOMY  05/20/2014    Hysterectomy    KNEE ARTHROSCOPY W/ DEBRIDEMENT  02/16/2015    Knee Arthroscopy (Therapeutic)    OTHER SURGICAL HISTORY  08/20/2019    Incisional Hernia Repair    RECTAL VAGINAL FISTULECTOMY  05/20/2014    Rectocele Repair     Family History   Problem Relation Name Age of Onset    Hypertension Mother      Other (cardiac disorder) Father      Heart attack Maternal Grandfather          SOCIAL HISTORY  Children 3, Grandchildren 7 and 1 great grandkid, Support system family and friends, 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 Her smoking use included cigarettes. She has a 62.00 pack-year smoking history. She does not have any smokeless tobacco history on file. She reports that she does not currently use alcohol. She reports that she does not  use drugs. Smoked 1 PPD until November 2023.     REVIEW OF SYSTEMS  Review of systems negative unless noted in HPI.       Objective     Current Outpatient Medications   Medication Instructions    albuterol (Ventolin HFA) 90 mcg/actuation inhaler 2 puffs, inhalation, As needed    ALBUTEROL INHL 2 puffs, Inhalation, [id number], PRN PRN for wheezing, # 18 g, Refill(s) 2, Date: 10/12/2023 14:11:00 EDT, Pharmacy: [facility] #[id number], 2 puffs Inhalation [id number],PRN:for wheezing, 154.94, 09/26/2023 19:59:00 EDT, Height/Length Dosing, cm, 86.8, 07/30/2023...    b complex vitamins capsule 1 capsule, oral, Daily    buPROPion XL (WELLBUTRIN XL) 300 mg, oral, Every morning    calcium-magnesium 300-300 mg tablet 1 tablet, oral, Daily    cyanocobalamin, vitamin B-12, (VITAMIN B-12 ORAL) DAILY, Date: 08/14/2018 14:05:00 EDT    echinacea 500 mg capsule 1 capsule, oral, 2 times daily    ELDERBERRY FRUIT ORAL Refill(s) 0, Date: 09/10/2020 16:16:00 EDT    GINKGO BILOBA ORAL oral    ginseng 100 mg capsule oral,  TAKE AS DIRECTED.    HYDROcodone-acetaminophen (Norco)  mg tablet 1 tablet, oral, Every 8 hours PRN    Lactobac. rhamnosus GG-inulin 20 billion cell -200 mg capsule oral    LORazepam (ATIVAN) 1 mg, oral, 2 times daily    LORazepam (ATIVAN) 1 mg, oral, 2 times daily    omeprazole (PRILOSEC) 20 mg, oral, Daily before breakfast    ondansetron (ZOFRAN) 8 mg, oral, Every 8 hours PRN    peppermint oiL liquid Topical    potassium chloride CR (Klor-Con M20) 20 mEq ER tablet 20 mEq, oral, 2 times daily, Do not crush or chew.    prochlorperazine (COMPAZINE) 10 mg, oral, Every 6 hours PRN    psyllium seed, with sugar, (METAMUCIL, SUGAR, ORAL) ORAL, Refill(s) 0, Date: 07/08/2021 15:08:00 EDT    rosuvastatin (Crestor) 10 mg tablet 1 tabs, ORAL, QHS, 90 tabs, Date: 08/14/2023 08:21:00 EDT, [facility] #[id number], 1 tabs ORAL QHS, 154.94, 07/30/2023 19:46:00 EDT, Height/Length Dosing, cm, 86.8, 07/30/2023 19:46:00 EDT, Weight Dosing, kg     solifenacin (VESICARE) 5 mg, oral, Nightly, Swallow tablet whole; do not crush, chew, or split.       Allergies:   Allergies   Allergen Reactions    Nitrofurantoin Monohyd/M-Cryst Anaphylaxis    Codeine Unknown    Nitrofurantoin Itching    Penicillin Hives and Other     pt tolerates cephalosporins    Penicillin G Unknown     Tolerated cefepime during 10/2019 admission   Tolerated ceftriaxone 7/2018 admission    Penicillins Rash    Tetracyclines Rash     MR brain w and wo IV contrast    Result Date: 2/12/2024  Interpreted By:  Estee Rueda, STUDY: MR BRAIN W AND WO IV CONTRAST;  2/10/2024 12:37 pm   INDICATION: Signs/Symptoms:Newly diagnosedï¿½advanced lung cancer..   COMPARISON: None.   ACCESSION NUMBER(S): [id number]   ORDERING CLINICIAN: IRWIN COBB   TECHNIQUE: Axial T2, FLAIR, DWI, gradient echo T2 and sagittal and coronal T1 weighted images of brain were acquired. Post contrast T1 weighted images were acquired after administration of 15 mL Dotarem gadolinium based intravenous contrast.   FINDINGS: CSF Spaces: The ventricles, sulci and basal cisterns are prominent reflecting age related involutional changes and mild volume loss.   Parenchyma: There is a 3 mm rounded focus of enhancement within the posterior frontal lobe on the left (image 133 of 160 series 13) worrisome for an intracranial metastatic lesion. No definite additional abnormal intraparenchymal enhancement is noted.   There is a punctate 1 mm focus of increased signal within the right parietal lobe on the diffusion-weighted images  (image 50 of 80 series 9) which may represent a recent infarct versus artifact. There is a mild degree of nonspecific patchy white matter signal compatible with microangiopathy. There is no mass effect or midline shift. There is no abnormal susceptibility artifact. Encephalomalacia and gliosis within the right cerebellum compatible with an old infarct. Cerebellar tonsils are above the foramen magnum. Pituitary and  sella are not enlarged.   Paranasal Sinuses and Mastoids: Visualized paranasal sinuses and mastoid air cells are predominantly clear. Evidence of prior left lens surgery. Major intracranial flow voids at the skull base are unremarkable.       There is a 3 mm focus of enhancement within the posterior frontal lobe on the left worrisome for an intracranial metastatic lesion. There is no associated mass effect. No midline shift.   There is a 1 mm focus of increased signal within the right parietal lobe on the diffusion-weighted images which may represent a recent infarct versus artifact. No definite associated enhancement.   Volume loss and mild degree of nonspecific white matter signal compatible with microangiopathy. 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   MACRO: Critical Finding:  See findings. Notification was initiated on 2/12/2024 at 9:18 am by  Estee Rueda.  (**-YCF-**)   Signed by: Estee Rueda 2/12/2024 9:18 AM Dictation workstation:   [id number]     Infusion on 02/16/2024   Component Date Value Ref Range Status    WBC 02/16/2024 10.2  4.4 - 11.3 x10*3/uL Final    nRBC 02/16/2024 0.0  0.0 - 0.0 /100 WBCs Final    RBC 02/16/2024 3.66 (L)  4.00 - 5.20 x10*6/uL Final    Hemoglobin 02/16/2024 9.0 (L)  12.0 - 16.0 g/dL Final    Hematocrit 02/16/2024 30.0 (L)  36.0 - 46.0 % Final    MCV 02/16/2024 82  80 - 100 fL Final    MCH 02/16/2024 24.6 (L)  26.0 - 34.0 pg Final    MCHC 02/16/2024 30.0 (L)  32.0 - 36.0 g/dL Final    RDW 02/16/2024 16.6 (H)  11.5 - 14.5 % Final    Platelets 02/16/2024 278  150 - 450 x10*3/uL Final    Neutrophils % 02/16/2024 76.4  40.0 - 80.0 % Final    Immature Granulocytes %, Automated 02/16/2024 0.8  0.0 - 0.9 % Final    Immature Granulocyte Count (IG) includes promyelocytes, myelocytes and metamyelocytes but does not include bands. Percent differential counts (%) should be interpreted in the context of the absolute cell counts (cells/UL).    Lymphocytes % 02/16/2024 9.4  13.0 - 44.0 %  Final    Monocytes % 02/16/2024 7.1  2.0 - 10.0 % Final    Eosinophils % 02/16/2024 5.5  0.0 - 6.0 % Final    Basophils % 02/16/2024 0.8  0.0 - 2.0 % Final    Neutrophils Absolute 02/16/2024 7.80 (H)  1.60 - 5.50 x10*3/uL Final    Percent differential counts (%) should be interpreted in the context of the absolute cell counts (cells/uL).    Immature Granulocytes Absolute, Au* 02/16/2024 0.08  0.00 - 0.50 x10*3/uL Final    Lymphocytes Absolute 02/16/2024 0.96  0.80 - 3.00 x10*3/uL Final    Monocytes Absolute 02/16/2024 0.73  0.05 - 0.80 x10*3/uL Final    Eosinophils Absolute 02/16/2024 0.56 (H)  0.00 - 0.40 x10*3/uL Final    Basophils Absolute 02/16/2024 0.08  0.00 - 0.10 x10*3/uL Final    Glucose 02/16/2024 230 (H)  74 - 99 mg/dL Final    Sodium 02/16/2024 135 (L)  136 - 145 mmol/L Final    Potassium 02/16/2024 2.6 (LL)  3.5 - 5.3 mmol/L Final    Chloride 02/16/2024 93 (L)  98 - 107 mmol/L Final    Bicarbonate 02/16/2024 29  21 - 32 mmol/L Final    Anion Gap 02/16/2024 16  10 - 20 mmol/L Final    Urea Nitrogen 02/16/2024 13  6 - 23 mg/dL Final    Creatinine 02/16/2024 1.17 (H)  0.50 - 1.05 mg/dL Final    eGFR 02/16/2024 48 (L)  >60 mL/min/1.73m*2 Final    Calculations of estimated GFR are performed using the 2021 CKD-EPI Study Refit equation without the race variable for the IDMS-Traceable creatinine methods.  https://jasn.asnjournals.org/content/early/2021/09/22/ASN.5651461102    Calcium 02/16/2024 11.1 (H)  8.6 - 10.3 mg/dL Final    Albumin 02/16/2024 3.4  3.4 - 5.0 g/dL Final    Alkaline Phosphatase 02/16/2024 49  33 - 136 U/L Final    Total Protein 02/16/2024 7.2  6.4 - 8.2 g/dL Final    AST 02/16/2024 7 (L)  9 - 39 U/L Final    Bilirubin, Total 02/16/2024 0.4  0.0 - 1.2 mg/dL Final    ALT 02/16/2024 5 (L)  7 - 45 U/L Final    Patients treated with Sulfasalazine may generate falsely decreased results for ALT.    Hepatitis B Surface AG 02/16/2024 Nonreactive  Nonreactive Final    Biotin interference may cause  falsely decreased results. Patients taking a Biotin dose of up to 5 mg/day should refrain from taking Biotin for 24 hours before sample collection. Providers may contact their local laboratory for  further information.    Hepatitis B Core AB- Total 02/16/2024 Nonreactive  Nonreactive Final    Hepatitis B Surface AB 02/16/2024 <3.1  <10.0 mIU/mL Final    Interpretive Criteria:                         <10 mIU/mL    Nonreactive                          >=10 mIU/mL    Reactive     Biotin interference may cause falsely decreased results. Patients taking a Biotin dose of up to 5 mg/day should refrain from taking Biotin for 24 hours before sample collection. Providers may contact their local laboratory for further information.    Adrenocorticotropic Hormone (ACTH) 02/16/2024 26.8  7.2 - 63.3 pg/mL Final    INTERPRETIVE INFORMATION: Adrenocorticotropic Hormone    Reference interval based on samples collected between 7 a.m. and   10 a.m.  No reference intervals established for p.m. collections.    Pediatric reference values are the same as adults (Acta Paediatr   Scand 1981;70:341-345).  This assay measures intact ACTH 1-39;   some types of synthetic ACTH and ACTH fragments are not detected   by this assay.  Performed By: [facility]  [address] [place] [id number]  : Yonas Mendez MD, PhD  IA Number: [id number]    Cortisol  A.M. 02/16/2024 29.0 (H)  5.0 - 20.0 ug/dL Final    Thyroid Stimulating Hormone 02/16/2024 0.54  0.44 - 3.98 mIU/L Final       PHYSICAL EXAMINATION  Vital Signs:   Vitals:    02/21/24 1050   BP: 107/62   Pulse: 94   Resp: 16   Temp: 36.5 °C (97.7 °F)   SpO2: 95%   Vital signs reviewed       Physical Exam  HENT:      Head: Normocephalic.      Nose: Nose normal.   Pulmonary:      Effort: Pulmonary effort is normal.   Musculoskeletal:      Comments: In a wheelchair    Skin:     General: Skin is warm.   Neurological:      Mental Status: She is alert and oriented to  person, place, and time.   Psychiatric:         Mood and Affect: Mood normal.         Behavior: Behavior normal.       ASSESSMENT/PLAN    Pain  Pain is: cancer related pain  Type: somatic  Pain control: well-controlled  Home regimen:   - Continue Norco 5-325 mg every 4-6 hours as needed for pain - this has been effective  - Has Tramadol 50 mg tabs at home - discussed not using concurrently with Norco  - Was prescribed Percocet 5-325 mg by the ER but has not used  - Allergy to codeine but has tolerated medications listed above    Opioid Use  Medication Management:   - OARRS report reviewed with no aberrant behavior; consistent with  prescriptions/records and patient history  - MED 00.  Overdose Risk Score 120.   This has been discussed with patient.   - We will continue to closely monitor the patient for signs of prescription misuse including UDS, OARRS review and subjective reports at each visit.  - concurrent benzodiazepine use with Lorazepam   - I am a provider who either is or has consulted and collaborated with a provider certified in Hospice and Palliative Medicine and have conducted a face-face visit and examination for this patient.  - Routine Urine Drug Screen completed 2/21/24 results pending  - Controlled Substance Agreement completed 2/21/24  - Specifically discussed that controlled substance prescriptions will only be provided by our group as outlined in the completed agreement  - Prescribed naloxone discuss sending next visit  - Red Flags: none     Nausea   At risk for nausea without vomiting related to chemotherapy   - Continue Ondansetron 8 mg every 8 hours as needed  - Continue prochlorperazine 10 mg every 6 hours as needed    Constipation   At risk for constipation related to opioids,  currently not constipated   Usual bowel pattern: varies multiple times per day or every 3-4 days   Current regimen: n/a   LBM 2/21/24     Altered Mood  Chronic anxiety and depression  controlled with home  regimen  Current regimen:   - Continue Wellbutrin 300 mg daily  - Continue Lorazepam 1 mg BID  - Follows with a psychiatrist     Sleeping Difficulty:  Impaired sleep related to pain  Current regimen:    - Continue pain medications as indicated above    Decreased appetite  Related to malignancy  Nutrition consult  Current regimen:    - Encouraged smaller, more frequent meals  - Continue to drink ensure  - Will reach out to Jenny Abbasi to look into     - Will place referral to urology today for ongoing UTI symptoms that are chronic in nature    Advance Directives  Existence of Advance Directives:Unknown- patient going to look for these documents at home and bring them in  Decision maker: HCPOA is Ronnaalyson Simpson  Code Status: DNR    Next Follow-Up Visit:  Return to clinic in 1 month    Signature and billing  Thank you for allowing us to participate in the care of this patient. Recommendations will be communicated back to the consulting service by way of shared electronic medical record or face-to-face.    Medical complexity was high level due to due to complexity of problems, extensive data review, and high risk of management/treatment.  Time was spent on the following: Prep Time, Time Directly with Patient/Family/Caregiver, Documentation Time. Total time spent: 60      DATA   Diagnostic tests and information reviewed for today's visit:  Most recent labs, Most recent imaging, Medications       Plan of Care discussed with: Patient and Family/Significant Other: daughter      SIGNATURE: HUANG Govea    Contact information:  [facility]  Monday-Friday 8 AM-5 PM  Phone:  [phone], press option #5, then option #1.   Or Epic Secure Chat

## 2024-02-21 NOTE — PROGRESS NOTES
Controlled Substance Agreement for Palliative Care reviewed and completed with patient. Education provided to outline both the expectations of the provider and the patient about the proper and safe use of controlled substances. Take Charge [place] and Know the signs of a drug overdose handouts reviewed and provided to patient.

## 2024-02-22 ENCOUNTER — NUTRITION (OUTPATIENT)
Dept: RADIATION ONCOLOGY | Facility: CLINIC | Age: 74
End: 2024-02-22
Payer: MEDICARE

## 2024-02-22 DIAGNOSIS — C34.31 SQUAMOUS CELL CARCINOMA OF LOWER LOBE OF RIGHT LUNG (MULTI): ICD-10-CM

## 2024-02-22 RX ORDER — GRANULES FOR ORAL 3 G/1
3 POWDER ORAL ONCE
Qty: 3 G | Refills: 0 | Status: SHIPPED | OUTPATIENT
Start: 2024-02-22 | End: 2024-02-22

## 2024-02-22 NOTE — PROGRESS NOTES
NUTRITION Assessment NOTE    Nutrition Assessment     Reason for Visit:  Janee Maddox is a 74 y.o. 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 Treatment with carboplatin/taxol/keytruda.     Visit Type: Clinical Nutrition, Oncology, Telephone Visit:  A telephone visit (audio only) between the patient (at the distant site) and the provider (at the originating site) was utilized to provide this telehealth service.    Verbal Consent for Encounter: Verbal consent was requested and obtained from patient on this date for a telehealth visit.      RD reached out to patient/daughter to discuss nutrition status. Has questions regarding coverage for nutritional supplements. Explained that Medicare will not cover ONS, but RD will send coupons. Attempted to discuss healthy diet, ways to increase intake. Patient with limited interest in nutrition interventions at this time. Is accepting to try appetite stimulant, will reach out to MD.     Patient Active Problem List   Diagnosis    Abdominal aortic ectasia (CMS/HCC)    Abdominal pannus    Anxiety    Change in bowel habits    Chronic cough    Chronic cystitis    Cutaneous skin tags    Dependent edema    Depression, major, in remission (CMS/HCC)    Excess skin of abdomen    Genitourinary syndrome of menopause    GERD (gastroesophageal reflux disease)    Hypercholesteremia    Impaired fasting glucose    Nocturia    Numbness and tingling    Obesity    Osteoarthritis of wrist    Other insomnia    Rhinorrhea    Sensorineural hearing loss of both ears    Urge incontinence of urine    Urinary urgency    Vaginal itching    Class 2 obesity with body mass index (BMI) of 35.0 to 35.9 in adult    Long-term current use of benzodiazepine    ESBL (extended spectrum beta-lactamase) producing bacteria infection    Dysuria    Diverticulosis    Diverticulitis of intestine    COPD (chronic obstructive pulmonary disease) (CMS/HCC)    Collapse    Coronary  atherosclerosis due to severely calcified coronary lesion    CAD (coronary artery disease)    C. difficile diarrhea    Agoraphobia    Abnormal CT scan, lung    Knee pain    Recurrent sinusitis    Panic attack    Otitis media    Open wound of anterior abdominal wall    Onychomycosis    Nicotine use disorder    Cigarette nicotine dependence with nicotine-induced disorder    Incisional hernia    ILD (interstitial lung disease) (CMS/HCC)    Generalized anxiety disorder with panic attacks    Anxiety and depression    Right knee pain    Right middle lobe pulmonary nodule    Tinnitus    Umbilical hernia    UTI (urinary tract infection)    Aortic ectasia, abdominal (CMS/HCC)    Diarrhea    Abscess    Obesity with body mass index 30 or greater    Obesity, Class I, BMI 30-34.9    Skin tag    Excess skin of abdominal wall    Gastroesophageal reflux disease    Pure hypercholesterolemia    Numbness and tingling sensation of skin    Insomnia    Sensorineural hearing loss, bilateral    Squamous cell carcinoma of lower lobe of right lung (CMS/HCC)         Lab Results   Component Value Date/Time    GLUCOSE 230 (H) 02/16/2024 1502     (L) 02/16/2024 1502    K 2.6 (LL) 02/16/2024 1502    CL 93 (L) 02/16/2024 1502    CO2 29 02/16/2024 1502    ANIONGAP 16 02/16/2024 1502    BUN 13 02/16/2024 1502    CREATININE 1.17 (H) 02/16/2024 1502    EGFR 48 (L) 02/16/2024 1502    CALCIUM 11.1 (H) 02/16/2024 1502    ALBUMIN 3.4 02/16/2024 1502    ALKPHOS 49 02/16/2024 1502    PROT 7.2 02/16/2024 1502    AST 7 (L) 02/16/2024 1502    BILITOT 0.4 02/16/2024 1502    ALT 5 (L) 02/16/2024 1502     Anthropometrics:    Wt Readings from Last 10 Encounters:   02/26/24 73.2 kg (161 lb 6 oz)   02/21/24 73.2 kg (161 lb 6 oz)   02/19/24 73.6 kg (162 lb 4.1 oz)   02/16/24 74.8 kg (165 lb)   01/24/24 78.8 kg (173 lb 11.6 oz)   11/25/23 60.5 kg (133 lb 6.1 oz)   09/13/22 87.2 kg (192 lb 4 oz)   07/25/22 85.8 kg (189 lb 1 oz)   09/28/21 85.7 kg (189 lb)  "  08/24/21 85.8 kg (189 lb 3.2 oz)        Food And Nutrient Intake:  Food and Nutrient History  Food and Nutrient History: Patient and daughter report patient is historically a very picky eater.  Energy Intake: Fair 50-75 %  Fluid Intake: drinking 64+oz fluid/day  Food Allergy: NKFA  GI Symptoms: early satiety  GI Symptoms greater than 2 weeks: yes  Oral Problems: dysgeusia     Food Intake  Meal 1: bowl of cereal with milk, yogurt, banana  Meal 2: lunchable  Meal 3: 1/2 bottle Ensure Plus    Food Preparation  Cooking: Patient, Family  Grocery Shopping: Patient, Family    Medication and Complementary/Alternative Medicine Use  Prescription Medication Use: compazine, zofran, ativan    Nutrition Focused Physical Exam Findings:  defer: phone visit    Energy Needs  Calculated Energy Needs Using Equations  Height: 155 cm (5' 1.02\")  Based on ABW: 57.9kg    1448-1737kcals (25-30kcals/kg)  58-69g protein (1-1.2g/kg)  1448-1737mL fluid (25-30mL/kg)    Nutrition Diagnosis   Malnutrition Diagnosis  Patient has Malnutrition Diagnosis: No    Nutrition Diagnosis  Patient has Nutrition Diagnosis: Yes  Diagnosis Status (1): New  Nutrition Diagnosis 1: Increased nutrient needs  Related to (1): catabolic disease and treatment    Nutrition Interventions/Recommendations   Nutrition Prescription  Individualized Nutrition Prescription Provided for : diet for cancer care    Food and Nutrition Delivery  Food and Nutrition Delivery  Meals & Snacks: Energy-modified diet, Protein-modified diet  Goals: Patient will focus on healthy/whole foods based diet, but ensure she is getting adequate protein and calories to meet nutrient needs.  Other:: Fluid  Goals: Patient will meet RD recommended fluid needs through fluids and high fluid foods.    Nutrition Education  Nutrition Education  Nutrition Education Content: Content related nutrition education  Goals: patient will understand the role diet plays in cancer care, recovery, and " survivorship    Coordination of Care  Coordination of Nutrition Care by a Nutrition Professional  Collaboration and referral of nutrition care: Collaboration by nutrition professional with other providers  Goals: RD will continue to work with oncology team to ensure best outcomes possible      Nutrition Monitoring and Evaluation   Food/Nutrient Related History Monitoring  Monitoring and Evaluation Plan: Energy intake, Protein intake, Meal/snack pattern  Energy Intake: Estimated energy intake  Criteria: patient will meet RD calculated caloric needs to support nutrition during treatment  Meal/Snack Pattern: Estimated meal and snack pattern  Criteria: patient will meet caloric needs by modifiying time and amount of food consumed (i.e. 5-6 small meals per day)  Estimated protein intake: Estimated protein intake  Criteria: patient will meet RD calculated protein needs via foods and adding ONS if needed  Body Composition/Growth/Weight History  Monitoring and Evaluation Plan: Weight change  Weight Change: Weight change percentage  Criteria: patient will maintain weight throughout treatment  Biochemical Data, Medical Tests and Procedures  Monitoring and Evaluation Plan: Electrolyte/renal panel  Electrolyte and Renal Panel: BUN, Calcium, serum, Chloride, Creatinine, Magnesium, Phosphorus, Potassium, Sodium  Nutrition Focused Physical Findings  Monitoring and Evaluation Plan: Muscles  Muscles: Muscle atrophy  Criteria: patient will maintain muscle mass throughout treatment    Time Spent  Prep time on day of patient encounter: 10 minutes  Time spent directly with patient, family or caregiver: 20 minutes  Documentation Time: 10 minutes      Readiness to Change : Fair  Level of Understanding : Fair  Anticipated Compliant : Fair

## 2024-02-26 VITALS — HEIGHT: 61 IN | BODY MASS INDEX: 30.47 KG/M2 | WEIGHT: 161.38 LBS

## 2024-02-27 DIAGNOSIS — C34.31 SQUAMOUS CELL CARCINOMA OF LOWER LOBE OF RIGHT LUNG (MULTI): Primary | ICD-10-CM

## 2024-02-27 RX ORDER — MIRTAZAPINE 15 MG/1
15 TABLET, FILM COATED ORAL NIGHTLY
Qty: 30 TABLET | Refills: 3 | Status: SHIPPED | OUTPATIENT
Start: 2024-02-27

## 2024-03-05 ENCOUNTER — HOSPITAL ENCOUNTER (OUTPATIENT)
Dept: RADIATION ONCOLOGY | Facility: HOSPITAL | Age: 74
Setting detail: RADIATION/ONCOLOGY SERIES
Discharge: HOME | End: 2024-03-05
Payer: MEDICARE

## 2024-03-05 VITALS
BODY MASS INDEX: 31.08 KG/M2 | RESPIRATION RATE: 18 BRPM | TEMPERATURE: 97.3 F | DIASTOLIC BLOOD PRESSURE: 69 MMHG | WEIGHT: 164.6 LBS | SYSTOLIC BLOOD PRESSURE: 102 MMHG | OXYGEN SATURATION: 95 % | HEART RATE: 101 BPM | HEIGHT: 61 IN

## 2024-03-05 DIAGNOSIS — C79.31 MALIGNANT NEOPLASM METASTATIC TO BRAIN (MULTI): Primary | ICD-10-CM

## 2024-03-05 DIAGNOSIS — C34.31 SQUAMOUS CELL CARCINOMA OF LOWER LOBE OF RIGHT LUNG (MULTI): ICD-10-CM

## 2024-03-05 PROCEDURE — 99215 OFFICE O/P EST HI 40 MIN: CPT | Performed by: STUDENT IN AN ORGANIZED HEALTH CARE EDUCATION/TRAINING PROGRAM

## 2024-03-05 PROCEDURE — 99205 OFFICE O/P NEW HI 60 MIN: CPT | Performed by: STUDENT IN AN ORGANIZED HEALTH CARE EDUCATION/TRAINING PROGRAM

## 2024-03-05 ASSESSMENT — ENCOUNTER SYMPTOMS
DECREASED CONCENTRATION: 1
DEPRESSION: 0
CARDIOVASCULAR NEGATIVE: 1
BACK PAIN: 1
OCCASIONAL FEELINGS OF UNSTEADINESS: 1
SHORTNESS OF BREATH: 1
FATIGUE: 1
CHILLS: 1
GASTROINTESTINAL NEGATIVE: 1
LIGHT-HEADEDNESS: 1
DEPRESSION: 1
UNEXPECTED WEIGHT CHANGE: 1
SLEEP DISTURBANCE: 1
EYES NEGATIVE: 1
LOSS OF SENSATION IN FEET: 0
FEVER: 1
NERVOUS/ANXIOUS: 1

## 2024-03-05 ASSESSMENT — PATIENT HEALTH QUESTIONNAIRE - PHQ9
2. FEELING DOWN, DEPRESSED OR HOPELESS: SEVERAL DAYS
SUM OF ALL RESPONSES TO PHQ9 QUESTIONS 1 AND 2: 2
10. IF YOU CHECKED OFF ANY PROBLEMS, HOW DIFFICULT HAVE THESE PROBLEMS MADE IT FOR YOU TO DO YOUR WORK, TAKE CARE OF THINGS AT HOME, OR GET ALONG WITH OTHER PEOPLE: NOT DIFFICULT AT ALL
1. LITTLE INTEREST OR PLEASURE IN DOING THINGS: SEVERAL DAYS

## 2024-03-05 ASSESSMENT — PAIN SCALES - GENERAL: PAINLEVEL: 5

## 2024-03-05 NOTE — PROGRESS NOTES
Radiation Oncology Outpatient Consult    Patient Name:  Jnaee Maddox  MRN:  [id number]  :  1950    Referring Provider: Prasanth Gonsales MD  Primary Care Provider: Frances Cervantes MD  Care Team: Patient Care Team:  Frances Cervantes MD as PCP - General (Internal Medicine)  Frances Cervantes MD as PCP - [employer] Advantage PCP  Prasanth Gonsales MD as Consulting Physician (Hematology and Oncology)  Shalom Wen MD as Consulting Physician (Hematology and Oncology)  Kenia Jackson RN as Registered Nurse (Hematology and Oncology)    Date of Service: 3/5/2024     SUBJECTIVE  History of Present Illness:  Janee Maddox is a 74 y.o. female who was referred by Prasanth Gonsales MD, for a consultation to the [facility] Department of Radiation Oncology.  She is presenting for evaluation and management of brain metastasis.    Ms. Maddox is a 74 year old woman with significant smoking history who presented to OSH with SOB, and sepsis. She was found to have a large cavitary lesion in the right lung along with pleural effusion. She underwent a CT guided biopsy which showed NSCLC - squamous cell carcinoma - PDL1 of 5%. She underwent a brain MRI, which showed 3 mm focus of enhancement within the posterior frontal lobe concerning for brain metastasis.    She has back pain by the shoulders. She denies any focal neurological symptoms.    Prior Radiotherapy:  No  Radiation Treatments       No radiation treatments to show. (Treatments may have been administered in another system.)          Current Systemic Treatment:  No     Presence of Pacemaker or ICD:  No    Past Medical History:    Past Medical History:   Diagnosis Date    Agoraphobia, unspecified 2016    Agoraphobia    Body mass index (BMI) 33.0-33.9, adult     BMI 33.0-33.9,adult    Chronic sinusitis, unspecified     Recurrent sinus infections    Coronary artery disease     Cutaneous abscess, unspecified 2016     Abscess    Disruption of external operation (surgical) wound, not elsewhere classified, initial encounter 07/31/2017    Open abdominal incision with drainage    Diverticulitis of intestine, part unspecified, without perforation or abscess without bleeding 08/13/2018    Acute diverticulitis    Encounter for immunization 04/21/2021    Encounter for immunization    Encounter for screening for lipoid disorders 04/26/2016    Screening cholesterol level    Hypercholesteremia     Hypertension     Incisional hernia without obstruction or gangrene 09/11/2015    Incisional hernia    Infection following a procedure, other surgical site, initial encounter 04/20/2017    Incisional infection    Local infection of the skin and subcutaneous tissue, unspecified 05/01/2019    Skin infection    Other conditions influencing health status     Complete Colonoscopy    Other specified postprocedural states 05/15/2018    History of incision and drainage    Otitis media, unspecified, left ear 05/19/2014    Otitis media, left    Pain in right knee 02/16/2015    Bilateral knee pain    Personal history of other diseases of the circulatory system     History of hypertension    Personal history of other infectious and parasitic diseases 07/03/2018    History of onychomycosis    Personal history of other infectious and parasitic diseases 10/18/2019    History of Clostridioides difficile colitis    Personal history of other specified conditions 04/20/2017    History of dysuria    Personal history of other specified conditions 02/02/2017    History of nocturia    Personal history of other specified conditions 10/18/2019    History of diarrhea    Personal history of other specified conditions 12/06/2019    History of dysuria    Personal history of other specified conditions 02/16/2015    History of dyspnea    Personal history of urinary (tract) infections 12/11/2019    History of urinary tract infection    Squamous cell carcinoma of lower lobe of  right lung (CMS/HCC) 2024    Syncope and collapse 2018    Collapse    Tinnitus, left ear 2014    Tinnitus of left ear    Unspecified open wound of abdominal wall, unspecified quadrant without penetration into peritoneal cavity, initial encounter 2019    Wound, open, abdominal wall, anterior        Past Surgical History:    Past Surgical History:   Procedure Laterality Date    CARPAL TUNNEL RELEASE  2014    Neuroplasty Decompression Median Nerve At Carpal Tunnel    CHOLECYSTECTOMY  2014    Cholecystectomy    COLONOSCOPY  2021    Complete Colonoscopy    CT GUIDED ASPIRATION OF ABSCESS, HEMATOMA, CYST  2023    CT GUIDED ASPIRATION OF ABSCESS, HEMATOMA, CYST 2023 PAR CT    CT GUIDED PERCUTANEOUS BIOPSY LUNG  2023    CT GUIDED PERCUTANEOUS BIOPSY LUNG 2023 PAR CT    HYSTERECTOMY  2014    Hysterectomy    KNEE ARTHROSCOPY W/ DEBRIDEMENT  2015    Knee Arthroscopy (Therapeutic)    OTHER SURGICAL HISTORY  2019    Incisional Hernia Repair    RECTAL VAGINAL FISTULECTOMY  2014    Rectocele Repair        Family History:  Cancer-related family history is not on file.    Social History:    Social History     Tobacco Use    Smoking status: Former     Packs/day: 1.00     Years: 62.00     Additional pack years: 0.00     Total pack years: 62.00     Types: Cigarettes     Quit date: 2023     Years since quittin.3   Substance Use Topics    Alcohol use: Not Currently    Drug use: Never       Allergies:    Allergies   Allergen Reactions    Nitrofurantoin Monohyd/M-Cryst Anaphylaxis    Codeine Unknown    Nitrofurantoin Itching    Penicillin Hives and Other     pt tolerates cephalosporins    Penicillin G Unknown     Tolerated cefepime during 10/2019 admission   Tolerated ceftriaxone 2018 admission    Penicillins Rash    Tetracyclines Rash        Medications:    Current Outpatient Medications:     albuterol (Ventolin HFA) 90 mcg/actuation  "inhaler, Inhale 2 puffs if needed for wheezing or shortness of breath (every 4 to 6 hours as needed)., Disp: 18 g, Rfl: 2    ALBUTEROL INHL, 2 puffs, Inhalation, [id number], PRN PRN for wheezing, # 18 g, Refill(s) 2, Date: 10/12/2023 14:11:00 EDT, Pharmacy: [id number], 2 puffs Inhalation [id number],PRN:for wheezing, 154.94, 09/26/2023 19:59:00 EDT, Height/Length Dosing, cm, 86.8, 07/30/2023..., Disp: , Rfl:     b complex vitamins capsule, Take 1 capsule by mouth once daily., Disp: , Rfl:     buPROPion XL (Wellbutrin XL) 300 mg 24 hr tablet, Take 1 tablet (300 mg) by mouth once daily in the morning., Disp: 90 tablet, Rfl: 1    calcium-magnesium 300-300 mg tablet, Take 1 tablet by mouth once daily., Disp: , Rfl:     cyanocobalamin, vitamin B-12, (VITAMIN B-12 ORAL), DAILY, Date: 08/14/2018 14:05:00 EDT, Disp: , Rfl:     LORazepam (Ativan) 1 mg tablet, Take 1 tablet (1 mg) by mouth 2 times a day. (Patient taking differently: Take 1 tablet (1 mg) by mouth 2 times a day. Pt takes it \"at 08:00 and 10:00 for 50 years\"), Disp: 60 tablet, Rfl: 1    omeprazole (PriLOSEC) 20 mg DR capsule, Take 1 capsule (20 mg) by mouth once daily in the morning. Take before meals., Disp: , Rfl:     potassium chloride CR (Klor-Con M20) 20 mEq ER tablet, Take 1 tablet (20 mEq) by mouth 2 times a day. Do not crush or chew., Disp: 60 tablet, Rfl: 3    prochlorperazine (Compazine) 10 mg tablet, Take 1 tablet (10 mg) by mouth every 6 hours if needed for nausea or vomiting., Disp: 30 tablet, Rfl: 2    echinacea 500 mg capsule, Take 1 capsule by mouth 2 times a day., Disp: , Rfl:     ELDERBERRY FRUIT ORAL, Refill(s) 0, Date: 09/10/2020 16:16:00 EDT, Disp: , Rfl:     GINKGO BILOBA ORAL, Take by mouth., Disp: , Rfl:     ginseng 100 mg capsule, Take by mouth.  TAKE AS DIRECTED., Disp: , Rfl:     Lactobac. rhamnosus GG-inulin 20 billion cell -200 mg capsule, Take by mouth., Disp: , Rfl:     LORazepam (Ativan) 1 mg tablet, Take 1 tablet (1 mg) by mouth 2 " times a day., Disp: 60 tablet, Rfl: 2    mirtazapine (Remeron) 15 mg tablet, Take 1 tablet (15 mg) by mouth once daily at bedtime. Take half tablet every night for 7 days and then 1 tablet every night, Disp: 30 tablet, Rfl: 3    ondansetron (Zofran) 8 mg tablet, Take 1 tablet (8 mg) by mouth every 8 hours if needed for nausea or vomiting., Disp: 30 tablet, Rfl: 2    peppermint oiL liquid, Apply topically., Disp: , Rfl:     psyllium seed, with sugar, (METAMUCIL, SUGAR, ORAL), ORAL, Refill(s) 0, Date: 07/08/2021 15:08:00 EDT, Disp: , Rfl:     rosuvastatin (Crestor) 10 mg tablet, 1 tabs, ORAL, QHS, 90 tabs, Date: 08/14/2023 08:21:00 EDT, [facility] #[id number], 1 tabs ORAL QHS, 154.94, 07/30/2023 19:46:00 EDT, Height/Length Dosing, cm, 86.8, 07/30/2023 19:46:00 EDT, Weight Dosing, kg, Disp: , Rfl:     solifenacin (VESIcare) 5 mg tablet, Take 1 tablet (5 mg) by mouth once daily at bedtime. Swallow tablet whole; do not crush, chew, or split., Disp: , Rfl:       Review of Systems:  Review of Systems   Constitutional:  Positive for chills, fatigue, fever and unexpected weight change.   HENT:   Positive for mouth sores.    Eyes: Negative.    Respiratory:  Positive for shortness of breath.    Cardiovascular: Negative.    Gastrointestinal: Negative.    Genitourinary: Negative.     Musculoskeletal:  Positive for back pain and gait problem.   Skin: Negative.    Neurological:  Positive for gait problem and light-headedness.   Psychiatric/Behavioral:  Positive for decreased concentration, depression and sleep disturbance. The patient is nervous/anxious.      The patient's current pain level was assessed.  They report currently having a pain of 0 out of 10.  They feel their pain is under control without the use of pain medications.    Performance Status:  The Karnofsky performance scale today is 60, Requires occasional assistance, but is able to care for most of her personal needs (ECOG equivalent 2).        OBJECTIVE  Physical Exam:  BP  "102/69   Pulse 101   Temp 36.3 °C (97.3 °F) (Temporal)   Resp 18   Ht 1.549 m (5' 1\")   Wt 74.7 kg (164 lb 9.6 oz)   SpO2 95%   BMI 31.10 kg/m²    Physical Exam  Constitutional:       General: She is not in acute distress.  HENT:      Head: Normocephalic and atraumatic.   Eyes:      Extraocular Movements: Extraocular movements intact.      Pupils: Pupils are equal, round, and reactive to light.   Cardiovascular:      Rate and Rhythm: Normal rate and regular rhythm.      Heart sounds: No murmur heard.  Pulmonary:      Effort: Pulmonary effort is normal. No respiratory distress.      Breath sounds: Normal breath sounds. No wheezing.   Abdominal:      General: Bowel sounds are normal. There is no distension.      Palpations: Abdomen is soft. There is no mass.      Tenderness: There is no abdominal tenderness.   Musculoskeletal:         General: Normal range of motion.   Skin:     Findings: No rash.   Neurological:      General: No focal deficit present.      Mental Status: She is alert and oriented to person, place, and time.      Cranial Nerves: No cranial nerve deficit.      Sensory: No sensory deficit.      Motor: No weakness.      Gait: Gait normal.   Psychiatric:         Mood and Affect: Mood normal.          Laboratory Review:  There are no laboratory contraindications to radiation therapy.    The pertinent lab results were reviewed and discussed with the patient.      Pathology Review:  The pertinent pathology results were reviewed and discussed with the patient.  Squamous cell carcinoma. PDL1 - 5%    Imaging:  The pertinent imaging results were reviewed and discussed with the patient.      MR brain w and wo IV contrast    Result Date: 2/12/2024  Interpreted By:  Estee Rueda, STUDY: MR BRAIN W AND WO IV CONTRAST;  2/10/2024 12:37 pm   INDICATION: Signs/Symptoms:Newly diagnosedï¿½advanced lung cancer..   COMPARISON: None.   ACCESSION NUMBER(S): [id number]   ORDERING CLINICIAN: IRWIN COBB" TECHNIQUE: Axial T2, FLAIR, DWI, gradient echo T2 and sagittal and coronal T1 weighted images of brain were acquired. Post contrast T1 weighted images were acquired after administration of 15 mL Dotarem gadolinium based intravenous contrast.   FINDINGS: CSF Spaces: The ventricles, sulci and basal cisterns are prominent reflecting age related involutional changes and mild volume loss.   Parenchyma: There is a 3 mm rounded focus of enhancement within the posterior frontal lobe on the left (image 133 of 160 series 13) worrisome for an intracranial metastatic lesion. No definite additional abnormal intraparenchymal enhancement is noted.   There is a punctate 1 mm focus of increased signal within the right parietal lobe on the diffusion-weighted images  (image 50 of 80 series 9) which may represent a recent infarct versus artifact. There is a mild degree of nonspecific patchy white matter signal compatible with microangiopathy. There is no mass effect or midline shift. There is no abnormal susceptibility artifact. Encephalomalacia and gliosis within the right cerebellum compatible with an old infarct. Cerebellar tonsils are above the foramen magnum. Pituitary and sella are not enlarged.   Paranasal Sinuses and Mastoids: Visualized paranasal sinuses and mastoid air cells are predominantly clear. Evidence of prior left lens surgery. Major intracranial flow voids at the skull base are unremarkable.       There is a 3 mm focus of enhancement within the posterior frontal lobe on the left worrisome for an intracranial metastatic lesion. There is no associated mass effect. No midline shift.   There is a 1 mm focus of increased signal within the right parietal lobe on the diffusion-weighted images which may represent a recent infarct versus artifact. No definite associated enhancement.   Volume loss and mild degree of nonspecific white matter signal compatible with microangiopathy. 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    MACRO: Critical Finding:  See findings. Notification was initiated on 2/12/2024 at 9:18 am by  Estee Rueda.  (**-YCF-**)   Signed by: Estee Rueda 2/12/2024 9:18 AM Dictation workstation:   [id number]         ASSESSMENT:  Janee Maddox is a 74 y.o. female with metastatic NSCLC with single brain metastasis. She is here for consideration of brain directed radiation.     PLAN:  I had a detailed discussion with Ms. Maddox regarding her clinical and pathological findings. I explained to her the different treatment options for brain metastases including whole brain RT vs SRS vs observation. We recommend gamma knife SRS based on the limited CNS involvement, her age and her performance status.    The potential side effects of Gamma Knife radiosurgery were discussed including short-term adverse effects associated with stereotactic frame placement such as headache following frame removal, bleeding or infection at the sites of pin insertion and a few days of periorbital swelling. Adverse effects specific to radiosurgery include short term sequelae such as fatigue and a small risk of alopecia approximately 3 weeks after treatment which would likely regrow within 3 months. Long-term risks associated with radiosurgery include fatigue, and treatment-associated brain edema and/or radionecrosis potentially causing headaches or other neurological symptoms requiring corticosteroids, anti-angiogenic agents, or even surgical intervention to control. We also discussed that there is a risk of additional brain metastases being found on the day of Gamma Knife treatment; in this scenario, additional brain lesions may be treated, or if the additional disease is extensive, the procedure may need to be aborted and  alternative treatments may be recommended.

## 2024-03-06 ENCOUNTER — APPOINTMENT (OUTPATIENT)
Dept: HEMATOLOGY/ONCOLOGY | Facility: CLINIC | Age: 74
End: 2024-03-06
Payer: MEDICARE

## 2024-03-06 PROBLEM — C79.31 MALIGNANT NEOPLASM METASTATIC TO BRAIN (MULTI): Status: ACTIVE | Noted: 2024-03-06

## 2024-03-06 RX ORDER — FAMOTIDINE 10 MG/ML
20 INJECTION INTRAVENOUS ONCE
Status: CANCELLED | OUTPATIENT
Start: 2024-03-25

## 2024-03-06 RX ORDER — PROCHLORPERAZINE EDISYLATE 5 MG/ML
10 INJECTION INTRAMUSCULAR; INTRAVENOUS EVERY 6 HOURS PRN
Status: CANCELLED | OUTPATIENT
Start: 2024-03-25

## 2024-03-06 RX ORDER — PALONOSETRON 0.05 MG/ML
0.25 INJECTION, SOLUTION INTRAVENOUS ONCE
Status: CANCELLED | OUTPATIENT
Start: 2024-03-25

## 2024-03-06 RX ORDER — ALBUTEROL SULFATE 0.83 MG/ML
3 SOLUTION RESPIRATORY (INHALATION) AS NEEDED
Status: CANCELLED | OUTPATIENT
Start: 2024-03-25

## 2024-03-06 RX ORDER — DIPHENHYDRAMINE HYDROCHLORIDE 50 MG/ML
50 INJECTION INTRAMUSCULAR; INTRAVENOUS AS NEEDED
Status: CANCELLED | OUTPATIENT
Start: 2024-03-25

## 2024-03-06 RX ORDER — DEXAMETHASONE IN 0.9 % SOD CHL 20 MG/50ML
20 INTRAVENOUS SOLUTION, PIGGYBACK (ML) INTRAVENOUS ONCE
Status: CANCELLED | OUTPATIENT
Start: 2024-03-25

## 2024-03-06 RX ORDER — DIPHENHYDRAMINE HCL 50 MG
50 CAPSULE ORAL ONCE
Status: CANCELLED | OUTPATIENT
Start: 2024-03-25

## 2024-03-06 RX ORDER — FAMOTIDINE 10 MG/ML
20 INJECTION INTRAVENOUS ONCE AS NEEDED
Status: CANCELLED | OUTPATIENT
Start: 2024-03-25

## 2024-03-06 RX ORDER — PROCHLORPERAZINE MALEATE 10 MG
10 TABLET ORAL EVERY 6 HOURS PRN
Status: CANCELLED | OUTPATIENT
Start: 2024-03-25

## 2024-03-06 RX ORDER — EPINEPHRINE 0.3 MG/.3ML
0.3 INJECTION SUBCUTANEOUS EVERY 5 MIN PRN
Status: CANCELLED | OUTPATIENT
Start: 2024-03-25

## 2024-03-07 ENCOUNTER — APPOINTMENT (OUTPATIENT)
Dept: HEMATOLOGY/ONCOLOGY | Facility: CLINIC | Age: 74
End: 2024-03-07
Payer: MEDICARE

## 2024-03-07 ENCOUNTER — APPOINTMENT (OUTPATIENT)
Dept: RADIATION ONCOLOGY | Facility: HOSPITAL | Age: 74
End: 2024-03-07
Payer: MEDICARE

## 2024-03-07 PROCEDURE — 77263 THER RADIOLOGY TX PLNG CPLX: CPT | Performed by: STUDENT IN AN ORGANIZED HEALTH CARE EDUCATION/TRAINING PROGRAM

## 2024-03-08 ENCOUNTER — TELEPHONE (OUTPATIENT)
Dept: HEMATOLOGY/ONCOLOGY | Facility: CLINIC | Age: 74
End: 2024-03-08
Payer: MEDICARE

## 2024-03-08 ENCOUNTER — APPOINTMENT (OUTPATIENT)
Dept: HEMATOLOGY/ONCOLOGY | Facility: CLINIC | Age: 74
End: 2024-03-08
Payer: MEDICARE

## 2024-03-08 NOTE — TELEPHONE ENCOUNTER
Pt granddaughter called to alert staff that pt would not be coming to appointment today. She is not feeling well/faint- 911 called Staff made aware.

## 2024-03-11 ENCOUNTER — INFUSION (OUTPATIENT)
Dept: HEMATOLOGY/ONCOLOGY | Facility: CLINIC | Age: 74
End: 2024-03-11
Payer: MEDICARE

## 2024-03-11 ENCOUNTER — OFFICE VISIT (OUTPATIENT)
Dept: HEMATOLOGY/ONCOLOGY | Facility: CLINIC | Age: 74
End: 2024-03-11
Payer: MEDICARE

## 2024-03-11 ENCOUNTER — SOCIAL WORK (OUTPATIENT)
Dept: HEMATOLOGY/ONCOLOGY | Facility: CLINIC | Age: 74
End: 2024-03-11
Payer: MEDICARE

## 2024-03-11 ENCOUNTER — APPOINTMENT (OUTPATIENT)
Dept: HEMATOLOGY/ONCOLOGY | Facility: CLINIC | Age: 74
End: 2024-03-11
Payer: MEDICARE

## 2024-03-11 VITALS
HEART RATE: 102 BPM | OXYGEN SATURATION: 99 % | DIASTOLIC BLOOD PRESSURE: 55 MMHG | RESPIRATION RATE: 22 BRPM | BODY MASS INDEX: 30.41 KG/M2 | WEIGHT: 160.94 LBS | TEMPERATURE: 96.4 F | SYSTOLIC BLOOD PRESSURE: 100 MMHG

## 2024-03-11 DIAGNOSIS — C79.31 MALIGNANT NEOPLASM METASTATIC TO BRAIN (MULTI): ICD-10-CM

## 2024-03-11 DIAGNOSIS — C34.31 SQUAMOUS CELL CARCINOMA OF LOWER LOBE OF RIGHT LUNG (MULTI): ICD-10-CM

## 2024-03-11 DIAGNOSIS — C79.31 MALIGNANT NEOPLASM METASTATIC TO BRAIN (MULTI): Primary | ICD-10-CM

## 2024-03-11 LAB
ALBUMIN SERPL BCP-MCNC: 3.3 G/DL (ref 3.4–5)
ALP SERPL-CCNC: 69 U/L (ref 33–136)
ALT SERPL W P-5'-P-CCNC: 7 U/L (ref 7–45)
ANION GAP SERPL CALC-SCNC: 13 MMOL/L (ref 10–20)
AST SERPL W P-5'-P-CCNC: 9 U/L (ref 9–39)
BASOPHILS # BLD AUTO: 0.11 X10*3/UL (ref 0–0.1)
BASOPHILS NFR BLD AUTO: 1.4 %
BILIRUB SERPL-MCNC: 0.3 MG/DL (ref 0–1.2)
BUN SERPL-MCNC: 19 MG/DL (ref 6–23)
CALCIUM SERPL-MCNC: 8.9 MG/DL (ref 8.6–10.3)
CHLORIDE SERPL-SCNC: 105 MMOL/L (ref 98–107)
CO2 SERPL-SCNC: 26 MMOL/L (ref 21–32)
CREAT SERPL-MCNC: 1.44 MG/DL (ref 0.5–1.05)
EGFRCR SERPLBLD CKD-EPI 2021: 38 ML/MIN/1.73M*2
EOSINOPHIL # BLD AUTO: 0.37 X10*3/UL (ref 0–0.4)
EOSINOPHIL NFR BLD AUTO: 4.8 %
ERYTHROCYTE [DISTWIDTH] IN BLOOD BY AUTOMATED COUNT: 18 % (ref 11.5–14.5)
GLUCOSE SERPL-MCNC: 125 MG/DL (ref 74–99)
HCT VFR BLD AUTO: 27.1 % (ref 36–46)
HGB BLD-MCNC: 8.1 G/DL (ref 12–16)
IMM GRANULOCYTES # BLD AUTO: 0.21 X10*3/UL (ref 0–0.5)
IMM GRANULOCYTES NFR BLD AUTO: 2.7 % (ref 0–0.9)
LYMPHOCYTES # BLD AUTO: 0.98 X10*3/UL (ref 0.8–3)
LYMPHOCYTES NFR BLD AUTO: 12.7 %
MCH RBC QN AUTO: 25.1 PG (ref 26–34)
MCHC RBC AUTO-ENTMCNC: 29.9 G/DL (ref 32–36)
MCV RBC AUTO: 84 FL (ref 80–100)
MONOCYTES # BLD AUTO: 0.35 X10*3/UL (ref 0.05–0.8)
MONOCYTES NFR BLD AUTO: 4.5 %
NEUTROPHILS # BLD AUTO: 5.68 X10*3/UL (ref 1.6–5.5)
NEUTROPHILS NFR BLD AUTO: 73.9 %
NRBC BLD-RTO: 0 /100 WBCS (ref 0–0)
PLATELET # BLD AUTO: 304 X10*3/UL (ref 150–450)
POTASSIUM SERPL-SCNC: 4 MMOL/L (ref 3.5–5.3)
PROT SERPL-MCNC: 6.6 G/DL (ref 6.4–8.2)
RBC # BLD AUTO: 3.23 X10*6/UL (ref 4–5.2)
SODIUM SERPL-SCNC: 140 MMOL/L (ref 136–145)
WBC # BLD AUTO: 7.7 X10*3/UL (ref 4.4–11.3)

## 2024-03-11 PROCEDURE — 85025 COMPLETE CBC W/AUTO DIFF WBC: CPT

## 2024-03-11 PROCEDURE — 2500000004 HC RX 250 GENERAL PHARMACY W/ HCPCS (ALT 636 FOR OP/ED): Performed by: INTERNAL MEDICINE

## 2024-03-11 PROCEDURE — 96361 HYDRATE IV INFUSION ADD-ON: CPT | Mod: INF

## 2024-03-11 PROCEDURE — 99214 OFFICE O/P EST MOD 30 MIN: CPT | Performed by: INTERNAL MEDICINE

## 2024-03-11 PROCEDURE — 1126F AMNT PAIN NOTED NONE PRSNT: CPT | Performed by: INTERNAL MEDICINE

## 2024-03-11 PROCEDURE — 96360 HYDRATION IV INFUSION INIT: CPT | Mod: INF

## 2024-03-11 PROCEDURE — 1159F MED LIST DOCD IN RCRD: CPT | Performed by: INTERNAL MEDICINE

## 2024-03-11 PROCEDURE — 80053 COMPREHEN METABOLIC PANEL: CPT

## 2024-03-11 RX ORDER — ALBUTEROL SULFATE 0.83 MG/ML
3 SOLUTION RESPIRATORY (INHALATION) AS NEEDED
Status: CANCELLED | OUTPATIENT
Start: 2024-03-11

## 2024-03-11 RX ORDER — DIPHENHYDRAMINE HYDROCHLORIDE 50 MG/ML
50 INJECTION INTRAMUSCULAR; INTRAVENOUS AS NEEDED
Status: CANCELLED | OUTPATIENT
Start: 2024-03-11

## 2024-03-11 RX ORDER — SODIUM CHLORIDE 9 MG/ML
500 INJECTION, SOLUTION INTRAVENOUS CONTINUOUS
Status: CANCELLED | OUTPATIENT
Start: 2024-03-11

## 2024-03-11 RX ORDER — EPINEPHRINE 0.3 MG/.3ML
0.3 INJECTION SUBCUTANEOUS EVERY 5 MIN PRN
Status: CANCELLED | OUTPATIENT
Start: 2024-03-11

## 2024-03-11 RX ORDER — FAMOTIDINE 10 MG/ML
20 INJECTION INTRAVENOUS ONCE AS NEEDED
Status: CANCELLED | OUTPATIENT
Start: 2024-03-11

## 2024-03-11 RX ORDER — HEPARIN 100 UNIT/ML
500 SYRINGE INTRAVENOUS AS NEEDED
Status: DISCONTINUED | OUTPATIENT
Start: 2024-03-11 | End: 2024-03-11 | Stop reason: HOSPADM

## 2024-03-11 RX ORDER — HEPARIN 100 UNIT/ML
500 SYRINGE INTRAVENOUS AS NEEDED
Status: CANCELLED | OUTPATIENT
Start: 2024-03-11

## 2024-03-11 RX ORDER — HEPARIN SODIUM,PORCINE/PF 10 UNIT/ML
50 SYRINGE (ML) INTRAVENOUS AS NEEDED
Status: CANCELLED | OUTPATIENT
Start: 2024-03-11

## 2024-03-11 RX ORDER — SODIUM CHLORIDE 9 MG/ML
500 INJECTION, SOLUTION INTRAVENOUS ONCE
Status: COMPLETED | OUTPATIENT
Start: 2024-03-11 | End: 2024-03-11

## 2024-03-11 RX ADMIN — SODIUM CHLORIDE 500 ML/HR: 9 INJECTION, SOLUTION INTRAVENOUS at 10:45

## 2024-03-11 RX ADMIN — HEPARIN 500 UNITS: 100 SYRINGE at 12:45

## 2024-03-11 ASSESSMENT — PAIN SCALES - GENERAL: PAINLEVEL: 0-NO PAIN

## 2024-03-11 NOTE — PROGRESS NOTES
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  TP53 mutated.        a. Ist line therapy with carboplatin/Taxol/Keytruda beginning Feb 2024.    CHIEF COMPLAINTS:  Patient is in the clinic today for evaluation of right lung squamous cell carcinoma and for continuation of the chemotherapy and management of therapy-related effects. .    HISTORY:  Janee Maddox is a 74 y.o. female with right lung squamous cell carcinoma.  She is a lifelong smoker and was admitted at [facility] in Nov 2023 with complaining of shortness of breath, chest discomfort and positive bacteremia.  During the evaluation she was noted to have a large cavitary lesion in the right lung along with pleural effusion.  CT scan at that time showed a 6.1 x 5 cm right lower lobe cavitary lung mass along with loculated right-sided pleural effusion and prominent mediastinal and hilar lymphadenopathy.  Overall findings are concerning for either abscess or baseline malignancy.  She was transferred to [facility] where a CT-guided biopsy confirmed Invasive squamous cell carcinoma.  PD-L1 expression was 5%.  Tumor NGS panel was mainly suggestive of TP53 mutation.  During hospitalization her clinical course was also complicated by Pantoea bacteremia and an ESBL Ecoli UTI.  She was treated with antibiotics and other supportive care.  A follow-up PET scan from Jan 2024 confirmed increased metabolic activity in the right lower lobe pleural-based cavitary mass along with mediastinal and right hilar lymphadenopathy and right-sided pleural disease consistent with known malignancy.  There was no evidence of distant metastatic disease.  Brain MRI showed small solitary left parietal metastatic lesion.  She is currently being treated with first-line therapy  with carbo/Taxol/Keytruda.  There are also plans for her to have stereotactic brain radiation.    INTERVAL HISTORY:  So far she has received 1 cycle of chemotherapy.  Clinical course has been complicated by worsening weakness, fatigue, dehydration and concern regarding recurrent UTIs.  She was admitted at [facility] twice and was supported with IV fluids, antibiotics, IV iron infusion and other supportive care.  Today she is feeling slightly better but still has issues with persistent weakness and fatigue.  Since her last visit she has also been evaluated by Dr. Albert in radiation oncology and there are plans for her to have stereotactic radiation to the brain lesion next week.    PAST MEDICAL HISTORY:  1.  Right lower lobe squamous cell lung cancer as detailed above.  2.  Coronary artery disease  3.  Hypertension  4.  Hyperlipidemia  5.  GERD  6.  Anxiety/depression  7.  History of C. difficile colitis  8.  E. coli UTI.  9.  History of complete hysterectomy, cholecystectomy and incisional hernia surgeries    SOCIAL HISTORY:  She lives alone in [place].  Quit smoking in 2023.  1 pack a day for 60 years prior smoking history.  Admit to occasional alcohol intake.  She work as a tax preparor.  Born and raised in [place].    FAMILY HISTORY:  Father  at age 86 from coronary artery disease. Mother also  at age 86 from multiple medical issues.  She had 1 sister and 4/2 siblings.  One of them  from myocardial infarction.  3 children, 7 grandkids and 1 great grand kid ~ all alive and well.  No other family history of bleeding, clotting or malignant disorders in the family history.    REVIEW OF SYSTEMS:   Pertinent finding as per the history above.  All other systems have been reviewed and generally negative and noncontributory.    VITAL SIGNS  BSA: 1.77 meters squared  /55   Pulse 102   Temp 35.8 °C (96.4 °F) (Temporal)   Resp 22   Wt 73 kg (160 lb 15 oz)   SpO2 99%    BMI 30.41 kg/m²     PHYSICAL EXAMINATION:  Detailed physical examination not done.    LAB DATA:  CBC from today shows a hemoglobin of 9.2 with MCV of 83.  White cell count is normal at 7.8 and platelets are 284 K.  Metabolic profile is unremarkable other than creatinine of 1.4.    UA from today shows a positive nitrite and leukocyte Estrace along with 1+ protein.  Follow-up urine cultures is pending.    RADIOLOGICAL DATA:  CT and PET scan results were reviewed and have been detailed in the history above.    Brain MRI 02/10/2024  Impression:  There is a 3 mm focus of enhancement within the posterior frontal lobe on the left worrisome for an intracranial metastatic lesion. There is no associated mass effect. No midline shift.      There is a 1 mm focus of increased signal within the right parietal lobe on the diffusion-weighted images which may represent a recent infarct versus artifact. No definite associated enhancement.      Volume loss and mild degree of nonspecific white matter signal compatible with microangiopathy. 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    PATHOLOGY DATA:  Surgical Pathology Exam: [id number]   FINAL DIAGNOSIS   A. LUNG, RIGHT; BIOPSY:    -- INVASIVE SQUAMOUS CELL CARCINOMA, KERATINIZING. See comment   Electronically signed by Lino Carrillo MD on 12/1/2023      PD-L1 22C3  (KEYTRUDA)Tumor Proportion Score (TPS): 5% MICROSATELLITE STATUS: Microsatellite Instability-High (MSI-H) is NOT DETECTED.      DISEASE ASSOCIATED GENOMIC FINDINGS:   TP53 p.G245V ([id number] c.734G>T)     DISEASE RELEVANT ALTERATIONS NOT DETECTED:   Negative for ALK fusion.  Negative for BRAF V600E.  Negative for EGFR sensitizing mutation.  Negative for ERBB2 activating mutation  Negative for KRAS G12C.  Negative for MET exon 14 skipping mutation.  Negative for NTRK fusion.  Negative for RET fusion.  Negative for ROS1 fusion.    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  TP53 mutated.   Please have   further details of initial presentation and management as outlined above.  In summary patient is a lifelong smoker and was noted to have a large cavitary right lower lung pleural-based lesion along with extensive mediastinal and hilar lymphadenopathy and loculated pleural effusion.  CT-guided biopsy from November 2023 was confirmatory of invasive squamous cell carcinoma.  PD-L1 TPS score is 5%.  NGS panel was unremarkable other than finding of TP53 mutation.  Brain MRI showed a small 3 mm left frontal lobe lesion concerning for metastatic disease.  She is currently being treated with first-line therapy with carbo/Taxol/Keytruda.  There are also plans for her to have stereotactic brain radiation.    Again, long discussion with the patient and she is explained about the diagnosis, stage, prognosis and treatment/management option.  In view of the pleural-based involvement and brain mets she has stage IV disease and is not a surgical candidate.  For now she will continue with combination of carboplatin/Taxol/Keytruda.  Probable benefit and side effect profile of mainly myelosuppression, hair loss, weakness, fatigue, neurotoxicity, nephrotoxicity, colitis, pneumonitis, endocrinopathies etc. were explained to them in detail.  Her overall prognosis is guarded.    Review of problems with dehydration, weakness, fatigue requiring frequent hospitalization I will delay the start of second cycle of chemotherapy by 1 week.  She will be supported with IV hydration in the clinic today.    2.  Brain mets.  MRI results are suggestive of small solitary left parietal lobe lesion.  There is no edema and clinically she is asymptomatic as far as brain mets is concerned.  She has been evaluated by radiation oncology and there are plans for her to have stereotactic radiation treatment next week.    3.  Chest pain.  Most likely secondary to baseline malignancy.  She has prescription for both Norco and Percocet.  Depending upon  the response and tolerability we will be continuing with 1 of those agents.  In the future she may also benefit from evaluation and follow-up with our palliative care team    3.  Anxiety/depression.  Patient seems to have significant baseline anxiety. I think she will also benefit from evaluation with psycho-oncology team.    4.  Follow-up.  She will return to the clinic next week for restart of chemotherapy.    This note has been transcribed using Dragon voice recognition system and there is a possibility of unintentional typing misprints.

## 2024-03-11 NOTE — PROGRESS NOTES
Followed up with pt and her dtr. [employer] apps needed to be signed and proof of income sent over to [employer]. All signatures gathered and needed proof of income all emailed to  [employer]. Pt also heard back from [facility] and they received her medicaid bernadette and are now asking for more info for pt's case per dtr. Advised dtr to call number on the back of [facility]'s bills and have an application sent out to have assist for those bills as well.

## 2024-03-11 NOTE — PROGRESS NOTES
1015 Pt received into trt area via w/c by RN, after office vs w/Dr. Gonsales this am, chemo cx d/t time constraint, for hydration only today.  1255 Pt renetta IV hydration well, voided qs in bathroom, disch via w/c by her daughter.

## 2024-03-13 ENCOUNTER — TELEMEDICINE (OUTPATIENT)
Dept: PALLIATIVE MEDICINE | Facility: CLINIC | Age: 74
End: 2024-03-13
Payer: MEDICARE

## 2024-03-13 DIAGNOSIS — G89.3 CANCER RELATED PAIN: Primary | ICD-10-CM

## 2024-03-13 PROCEDURE — 99213 OFFICE O/P EST LOW 20 MIN: CPT

## 2024-03-13 PROCEDURE — 1159F MED LIST DOCD IN RCRD: CPT

## 2024-03-13 NOTE — PROGRESS NOTES
SUPPORTIVE AND PALLIATIVE ONCOLOGY CONSULT - OUTPATIENT      SERVICE DATE: 3/13/2024    Virtual or Telephone Consent    An interactive audio and video telecommunication system which permits real time communications between the patient (at the originating site) and provider (at the distant site) was utilized to provide this telehealth service.   Verbal consent was requested and obtained from Janee Maddox on this date, 03/13/24 for a telehealth visit.       Subjective   HISTORY OF PRESENT ILLNESS: Janee Maddox is a 74 y.o. 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 Started treatment with Pembrolizumab, Paclitaxel, and Carboplatin 2/19.     Second cycle delayed due to hospital admission and dehydration.     Pain Assessment:  Location: abdomen and right side/mid-back  Description: left side/back pain that is sharp when she bends over. She has a dull ache as well. Feels that she pulled something yesterday. She has been taking Norco at night if needed and Tylenol 500 mg during the day.    Symptom Assessment:  Pain:somewhat  Headache: none  Dizziness:none  Lack of energy: a little  Difficulty sleeping: none   Worrying: a little  Anxiety: a little  Depression: a little. Good and bad days  Pain in mouth/swallowing: none  Dry mouth: none  Taste changes: a little  Shortness of breath: none  Lack of appetite: a little. Feels it has improved recently. Drinking 1 boost per day. Trying to drink plenty of fluids including body armour.   Nausea: none  Vomiting: none  Constipation: none  Diarrhea: a little. States it comes and goes. 1-2 times per day at most, but not everyday.   Sore muscles: none  Numbness or tingling in hands/feet/other: none  Weight loss: none  Other:  UTI symptoms have improved.       Information obtained from: chart review, interview of patient, and interview of  family  ______________________________________________________________________     Oncology History   Squamous cell carcinoma of lower lobe of right lung (CMS/HCC)   1/24/2024 Initial Diagnosis    Squamous cell carcinoma of lower lobe of right lung (CMS/HCC)     2/19/2024 -  Chemotherapy    Pembrolizumab + PACLitaxel / CARBOplatin, 21 Day Cycles         Past Medical History:   Diagnosis Date    Agoraphobia, unspecified 12/06/2016    Agoraphobia    Body mass index (BMI) 33.0-33.9, adult     BMI 33.0-33.9,adult    Chronic sinusitis, unspecified     Recurrent sinus infections    Coronary artery disease     Cutaneous abscess, unspecified 06/01/2016    Abscess    Disruption of external operation (surgical) wound, not elsewhere classified, initial encounter 07/31/2017    Open abdominal incision with drainage    Diverticulitis of intestine, part unspecified, without perforation or abscess without bleeding 08/13/2018    Acute diverticulitis    Encounter for immunization 04/21/2021    Encounter for immunization    Encounter for screening for lipoid disorders 04/26/2016    Screening cholesterol level    Hypercholesteremia     Hypertension     Incisional hernia without obstruction or gangrene 09/11/2015    Incisional hernia    Infection following a procedure, other surgical site, initial encounter 04/20/2017    Incisional infection    Local infection of the skin and subcutaneous tissue, unspecified 05/01/2019    Skin infection    Other conditions influencing health status     Complete Colonoscopy    Other specified postprocedural states 05/15/2018    History of incision and drainage    Otitis media, unspecified, left ear 05/19/2014    Otitis media, left    Pain in right knee 02/16/2015    Bilateral knee pain    Personal history of other diseases of the circulatory system     History of hypertension    Personal history of other infectious and parasitic diseases 07/03/2018    History of onychomycosis    Personal history of other  infectious and parasitic diseases 10/18/2019    History of Clostridioides difficile colitis    Personal history of other specified conditions 04/20/2017    History of dysuria    Personal history of other specified conditions 02/02/2017    History of nocturia    Personal history of other specified conditions 10/18/2019    History of diarrhea    Personal history of other specified conditions 12/06/2019    History of dysuria    Personal history of other specified conditions 02/16/2015    History of dyspnea    Personal history of urinary (tract) infections 12/11/2019    History of urinary tract infection    Squamous cell carcinoma of lower lobe of right lung (CMS/HCC) 01/24/2024    Syncope and collapse 01/05/2018    Collapse    Tinnitus, left ear 08/29/2014    Tinnitus of left ear    Unspecified open wound of abdominal wall, unspecified quadrant without penetration into peritoneal cavity, initial encounter 08/23/2019    Wound, open, abdominal wall, anterior     Past Surgical History:   Procedure Laterality Date    CARPAL TUNNEL RELEASE  05/20/2014    Neuroplasty Decompression Median Nerve At Carpal Tunnel    CHOLECYSTECTOMY  05/20/2014    Cholecystectomy    COLONOSCOPY  01/08/2021    Complete Colonoscopy    CT GUIDED ASPIRATION OF ABSCESS, HEMATOMA, CYST  11/27/2023    CT GUIDED ASPIRATION OF ABSCESS, HEMATOMA, CYST 11/27/2023 PAR CT    CT GUIDED PERCUTANEOUS BIOPSY LUNG  11/27/2023    CT GUIDED PERCUTANEOUS BIOPSY LUNG 11/27/2023 PAR CT    HYSTERECTOMY  05/20/2014    Hysterectomy    KNEE ARTHROSCOPY W/ DEBRIDEMENT  02/16/2015    Knee Arthroscopy (Therapeutic)    OTHER SURGICAL HISTORY  08/20/2019    Incisional Hernia Repair    RECTAL VAGINAL FISTULECTOMY  05/20/2014    Rectocele Repair     Family History   Problem Relation Name Age of Onset    Hypertension Mother      Other (cardiac disorder) Father      Heart attack Maternal Grandfather          SOCIAL HISTORY  Children 3, Grandchildren 7 and 1 great grandkid, Support  system family and friends, 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 Her smoking use included cigarettes. She has a 62.00 pack-year smoking history. She does not have any smokeless tobacco history on file. She reports that she does not currently use alcohol. She reports that she does not use drugs. Smoked 1 PPD until November 2023.     REVIEW OF SYSTEMS  Review of systems negative unless noted in HPI.       Objective     Current Outpatient Medications   Medication Instructions    albuterol (Ventolin HFA) 90 mcg/actuation inhaler 2 puffs, inhalation, As needed    ALBUTEROL INHL 2 puffs, Inhalation, [id number], PRN PRN for wheezing, # 18 g, Refill(s) 2, Date: 10/12/2023 14:11:00 EDT, Pharmacy: [id number], 2 puffs Inhalation [id number],PRN:for wheezing, 154.94, 09/26/2023 19:59:00 EDT, Height/Length Dosing, cm, 86.8, 07/30/2023...    buPROPion XL (WELLBUTRIN XL) 300 mg, oral, Every morning    Lactobac. rhamnosus GG-inulin 20 billion cell -200 mg capsule oral    LORazepam (ATIVAN) 1 mg, oral, 2 times daily    LORazepam (ATIVAN) 1 mg, oral, 2 times daily    mirtazapine (REMERON) 15 mg, oral, Nightly, Take half tablet every night for 7 days and then 1 tablet every night    omeprazole (PRILOSEC) 20 mg, oral, Daily before breakfast    ondansetron (ZOFRAN) 8 mg, oral, Every 8 hours PRN    peppermint oiL liquid Topical    potassium chloride CR (Klor-Con M20) 20 mEq ER tablet 20 mEq, oral, 2 times daily, Do not crush or chew.    prochlorperazine (COMPAZINE) 10 mg, oral, Every 6 hours PRN    psyllium seed, with sugar, (METAMUCIL, SUGAR, ORAL) ORAL, Refill(s) 0, Date: 07/08/2021 15:08:00 EDT       Allergies:   Allergies   Allergen Reactions    Nitrofurantoin Monohyd/M-Cryst Anaphylaxis    Codeine Unknown    Nitrofurantoin Itching    Penicillin Hives and Other     pt tolerates  cephalosporins    Penicillin G Unknown     Tolerated cefepime during 10/2019 admission   Tolerated ceftriaxone 7/2018 admission    Penicillins Rash    Tetracyclines Rash       Infusion on 03/11/2024   Component Date Value Ref Range Status    WBC 03/11/2024 7.7  4.4 - 11.3 x10*3/uL Final    nRBC 03/11/2024 0.0  0.0 - 0.0 /100 WBCs Final    RBC 03/11/2024 3.23 (L)  4.00 - 5.20 x10*6/uL Final    Hemoglobin 03/11/2024 8.1 (L)  12.0 - 16.0 g/dL Final    Hematocrit 03/11/2024 27.1 (L)  36.0 - 46.0 % Final    MCV 03/11/2024 84  80 - 100 fL Final    MCH 03/11/2024 25.1 (L)  26.0 - 34.0 pg Final    MCHC 03/11/2024 29.9 (L)  32.0 - 36.0 g/dL Final    RDW 03/11/2024 18.0 (H)  11.5 - 14.5 % Final    Platelets 03/11/2024 304  150 - 450 x10*3/uL Final    Neutrophils % 03/11/2024 73.9  40.0 - 80.0 % Final    Immature Granulocytes %, Automated 03/11/2024 2.7 (H)  0.0 - 0.9 % Final    Immature Granulocyte Count (IG) includes promyelocytes, myelocytes and metamyelocytes but does not include bands. Percent differential counts (%) should be interpreted in the context of the absolute cell counts (cells/UL).    Lymphocytes % 03/11/2024 12.7  13.0 - 44.0 % Final    Monocytes % 03/11/2024 4.5  2.0 - 10.0 % Final    Eosinophils % 03/11/2024 4.8  0.0 - 6.0 % Final    Basophils % 03/11/2024 1.4  0.0 - 2.0 % Final    Neutrophils Absolute 03/11/2024 5.68 (H)  1.60 - 5.50 x10*3/uL Final    Percent differential counts (%) should be interpreted in the context of the absolute cell counts (cells/uL).    Immature Granulocytes Absolute, Au* 03/11/2024 0.21  0.00 - 0.50 x10*3/uL Final    Lymphocytes Absolute 03/11/2024 0.98  0.80 - 3.00 x10*3/uL Final    Monocytes Absolute 03/11/2024 0.35  0.05 - 0.80 x10*3/uL Final    Eosinophils Absolute 03/11/2024 0.37  0.00 - 0.40 x10*3/uL Final    Basophils Absolute 03/11/2024 0.11 (H)  0.00 - 0.10 x10*3/uL Final    Glucose 03/11/2024 125 (H)  74 - 99 mg/dL Final    Sodium 03/11/2024 140  136 - 145 mmol/L Final     Potassium 03/11/2024 4.0  3.5 - 5.3 mmol/L Final    Chloride 03/11/2024 105  98 - 107 mmol/L Final    Bicarbonate 03/11/2024 26  21 - 32 mmol/L Final    Anion Gap 03/11/2024 13  10 - 20 mmol/L Final    Urea Nitrogen 03/11/2024 19  6 - 23 mg/dL Final    Creatinine 03/11/2024 1.44 (H)  0.50 - 1.05 mg/dL Final    eGFR 03/11/2024 38 (L)  >60 mL/min/1.73m*2 Final    Calculations of estimated GFR are performed using the 2021 CKD-EPI Study Refit equation without the race variable for the IDMS-Traceable creatinine methods.  https://jasn.asnjournals.org/content/early/2021/09/22/ASN.5001810562    Calcium 03/11/2024 8.9  8.6 - 10.3 mg/dL Final    Albumin 03/11/2024 3.3 (L)  3.4 - 5.0 g/dL Final    Alkaline Phosphatase 03/11/2024 69  33 - 136 U/L Final    Total Protein 03/11/2024 6.6  6.4 - 8.2 g/dL Final    AST 03/11/2024 9  9 - 39 U/L Final    Bilirubin, Total 03/11/2024 0.3  0.0 - 1.2 mg/dL Final    ALT 03/11/2024 7  7 - 45 U/L Final    Patients treated with Sulfasalazine may generate falsely decreased results for ALT.       PHYSICAL EXAMINATION  Vital Signs: not completed due to virtual visit       Physical Exam not completed due to virtual visit    ASSESSMENT/PLAN    Pain  Pain is: cancer related pain  Type: somatic  Pain control: well-controlled  Home regimen:   - Continue Norco 5-325 mg every 4-6 hours as needed for pain - this has been effective  - Continue Acetaminophen 500-1000 mg every 8 hours - watch with acetaminophen included in norco   - Has Tramadol 50 mg tabs at home - discussed not using concurrently with Norco  - Was prescribed Percocet 5-325 mg by the ER but has not used  - Allergy to codeine but has tolerated medications listed above    Opioid Use  Medication Management:   - OARRS report reviewed with no aberrant behavior; consistent with  prescriptions/records and patient history  - MED 00.  Overdose Risk Score 120.   This has been discussed with patient.   - We will continue to closely monitor the  patient for signs of prescription misuse including UDS, OARRS review and subjective reports at each visit.  - concurrent benzodiazepine use with Lorazepam   - I am a provider who either is or has consulted and collaborated with a provider certified in Hospice and Palliative Medicine and have conducted a face-face visit and examination for this patient.  - Routine Urine Drug Screen completed 2/21/24 negative for illicit substances  - Controlled Substance Agreement completed 2/21/24  - Specifically discussed that controlled substance prescriptions will only be provided by our group as outlined in the completed agreement  - Prescribed naloxone discuss sending next visit  - Red Flags: none     Nausea   At risk for nausea without vomiting related to chemotherapy   - Continue Ondansetron 8 mg every 8 hours as needed  - Continue prochlorperazine 10 mg every 6 hours as needed    Constipation   At risk for constipation related to opioids,  currently not constipated   Usual bowel pattern: varies multiple times per day or every 3-4 days   Current regimen: n/a   LBM 2/21/24     Altered Mood  Chronic anxiety and depression  controlled with home regimen  Current regimen:   - Continue Wellbutrin 300 mg daily  - Continue Lorazepam 1 mg BID  - Follows with a psychiatrist     Sleeping Difficulty:  Impaired sleep related to pain  Current regimen:    - Continue pain medications as indicated above    Decreased appetite  Related to malignancy  Nutrition consult  Current regimen:    - Encouraged smaller, more frequent meals  - Continue to drink boost daily    Advance Directives  Existence of Advance Directives:Unknown- patient going to look for these documents at home and bring them in  Decision maker: JESS Simpson  Code Status: DNR    Next Follow-Up Visit:  Return to clinic in 1 month    Signature and billing  Thank you for allowing us to participate in the care of this patient. Recommendations will be communicated back to the  consulting service by way of shared electronic medical record or face-to-face.    Medical complexity was high level due to due to complexity of problems, extensive data review, and high risk of management/treatment.  Time was spent on the following: Prep Time, Time Directly with Patient/Family/Caregiver, Documentation Time. Total time spent: 20      DATA   Diagnostic tests and information reviewed for today's visit:  Most recent labs, Most recent imaging, Medications       Plan of Care discussed with: Patient and Family/Significant Other: daughter      SIGNATURE: HUANG Govea    Contact information:  Supportive and Palliative Oncology  Monday-Friday 8 AM-5 PM  Phone:  [phone], press option #5, then option #1.   Or Epic Secure Chat

## 2024-03-14 NOTE — ADDENDUM NOTE
Encounter addended by: Dameon Albert MD, MS on: 3/14/2024 10:39 AM   Actions taken: SmartForm saved, Order list changed, Diagnosis association updated

## 2024-03-18 ENCOUNTER — PREP FOR PROCEDURE (OUTPATIENT)
Dept: NEUROSURGERY | Facility: HOSPITAL | Age: 74
End: 2024-03-18
Payer: MEDICARE

## 2024-03-18 ENCOUNTER — APPOINTMENT (OUTPATIENT)
Dept: HEMATOLOGY/ONCOLOGY | Facility: CLINIC | Age: 74
End: 2024-03-18
Payer: MEDICARE

## 2024-03-19 ENCOUNTER — RADIATION ONCOLOGY OTV (OUTPATIENT)
Dept: RADIATION ONCOLOGY | Facility: HOSPITAL | Age: 74
End: 2024-03-19
Payer: MEDICARE

## 2024-03-19 ENCOUNTER — DOCUMENTATION (OUTPATIENT)
Dept: RADIATION ONCOLOGY | Facility: HOSPITAL | Age: 74
End: 2024-03-19

## 2024-03-19 ENCOUNTER — DOCUMENTATION (OUTPATIENT)
Dept: NEUROSURGERY | Facility: HOSPITAL | Age: 74
End: 2024-03-19
Payer: MEDICARE

## 2024-03-19 ENCOUNTER — HOSPITAL ENCOUNTER (OUTPATIENT)
Dept: RADIOLOGY | Facility: HOSPITAL | Age: 74
Discharge: HOME | End: 2024-03-19

## 2024-03-19 ENCOUNTER — HOSPITAL ENCOUNTER (OUTPATIENT)
Dept: RADIATION ONCOLOGY | Facility: HOSPITAL | Age: 74
Setting detail: RADIATION/ONCOLOGY SERIES
Discharge: HOME | End: 2024-03-19
Payer: MEDICARE

## 2024-03-19 VITALS
HEART RATE: 92 BPM | DIASTOLIC BLOOD PRESSURE: 60 MMHG | SYSTOLIC BLOOD PRESSURE: 103 MMHG | RESPIRATION RATE: 16 BRPM | OXYGEN SATURATION: 92 %

## 2024-03-19 VITALS
RESPIRATION RATE: 16 BRPM | OXYGEN SATURATION: 95 % | SYSTOLIC BLOOD PRESSURE: 116 MMHG | HEIGHT: 65 IN | BODY MASS INDEX: 27.32 KG/M2 | DIASTOLIC BLOOD PRESSURE: 80 MMHG | WEIGHT: 164 LBS | HEART RATE: 97 BPM | TEMPERATURE: 97.7 F

## 2024-03-19 DIAGNOSIS — C79.31 SECONDARY MALIGNANT NEOPLASM OF BRAIN (MULTI): ICD-10-CM

## 2024-03-19 DIAGNOSIS — C79.31 MALIGNANT NEOPLASM METASTATIC TO BRAIN (MULTI): Primary | ICD-10-CM

## 2024-03-19 DIAGNOSIS — C34.31 MALIGNANT NEOPLASM OF LOWER LOBE, RIGHT BRONCHUS OR LUNG (MULTI): ICD-10-CM

## 2024-03-19 DIAGNOSIS — C79.31 MALIGNANT NEOPLASM METASTATIC TO BRAIN (MULTI): ICD-10-CM

## 2024-03-19 LAB
RAD ONC MSQ ACTUAL FRACTIONS DELIVERED: 1
RAD ONC MSQ ACTUAL SESSION DELIVERED DOSE: 2200 CGRAY
RAD ONC MSQ ACTUAL TOTAL DOSE: 2200 CGRAY
RAD ONC MSQ ELAPSED DAYS: 0
RAD ONC MSQ LAST DATE: NORMAL
RAD ONC MSQ PRESCRIBED FRACTIONAL DOSE: 2200 CGRAY
RAD ONC MSQ PRESCRIBED NUMBER OF FRACTIONS: 1
RAD ONC MSQ PRESCRIBED TECHNIQUE: NORMAL
RAD ONC MSQ PRESCRIBED TOTAL DOSE: 2200 CGRAY
RAD ONC MSQ START DATE: NORMAL
RAD ONC MSQ TREATMENT COURSE NUMBER: 1
RAD ONC MSQ TREATMENT SITE: NORMAL

## 2024-03-19 PROCEDURE — 77295 3-D RADIOTHERAPY PLAN: CPT | Performed by: STUDENT IN AN ORGANIZED HEALTH CARE EDUCATION/TRAINING PROGRAM

## 2024-03-19 PROCEDURE — 77300 RADIATION THERAPY DOSE PLAN: CPT | Performed by: STUDENT IN AN ORGANIZED HEALTH CARE EDUCATION/TRAINING PROGRAM

## 2024-03-19 PROCEDURE — 2500000004 HC RX 250 GENERAL PHARMACY W/ HCPCS (ALT 636 FOR OP/ED): Performed by: NEUROLOGICAL SURGERY

## 2024-03-19 PROCEDURE — 77370 RADIATION PHYSICS CONSULT: CPT | Mod: XE | Performed by: STUDENT IN AN ORGANIZED HEALTH CARE EDUCATION/TRAINING PROGRAM

## 2024-03-19 PROCEDURE — 70553 MRI BRAIN STEM W/O & W/DYE: CPT | Performed by: RADIOLOGY

## 2024-03-19 PROCEDURE — 61800 APPLY SRS HEADFRAME ADD-ON: CPT | Performed by: NEUROLOGICAL SURGERY

## 2024-03-19 PROCEDURE — 77432 STEREOTACTIC RADIATION TRMT: CPT | Performed by: STUDENT IN AN ORGANIZED HEALTH CARE EDUCATION/TRAINING PROGRAM

## 2024-03-19 PROCEDURE — 77334 RADIATION TREATMENT AID(S): CPT | Performed by: STUDENT IN AN ORGANIZED HEALTH CARE EDUCATION/TRAINING PROGRAM

## 2024-03-19 PROCEDURE — 77371 SRS MULTISOURCE: CPT | Performed by: STUDENT IN AN ORGANIZED HEALTH CARE EDUCATION/TRAINING PROGRAM

## 2024-03-19 PROCEDURE — 61796 SRS CRANIAL LESION SIMPLE: CPT | Performed by: NEUROLOGICAL SURGERY

## 2024-03-19 PROCEDURE — 61797 SRS CRAN LES SIMPLE ADDL: CPT | Performed by: NEUROLOGICAL SURGERY

## 2024-03-19 PROCEDURE — 2500000001 HC RX 250 WO HCPCS SELF ADMINISTERED DRUGS (ALT 637 FOR MEDICARE OP): Performed by: NEUROLOGICAL SURGERY

## 2024-03-19 RX ORDER — MIDAZOLAM HYDROCHLORIDE 1 MG/ML
0.5 INJECTION, SOLUTION INTRAMUSCULAR; INTRAVENOUS AS NEEDED
Status: DISCONTINUED | OUTPATIENT
Start: 2024-03-19 | End: 2024-03-20 | Stop reason: HOSPADM

## 2024-03-19 RX ORDER — ONDANSETRON HYDROCHLORIDE 2 MG/ML
4 INJECTION, SOLUTION INTRAVENOUS EVERY 4 HOURS PRN
Status: DISCONTINUED | OUTPATIENT
Start: 2024-03-19 | End: 2024-03-20 | Stop reason: HOSPADM

## 2024-03-19 RX ORDER — LIDOCAINE AND PRILOCAINE 25; 25 MG/G; MG/G
1 CREAM TOPICAL ONCE
Status: COMPLETED | OUTPATIENT
Start: 2024-03-19 | End: 2024-03-19

## 2024-03-19 RX ORDER — LIDOCAINE AND PRILOCAINE 25; 25 MG/G; MG/G
CREAM TOPICAL
Status: DISPENSED
Start: 2024-03-19 | End: 2024-03-19

## 2024-03-19 RX ORDER — ACETAMINOPHEN 325 MG/1
650 TABLET ORAL EVERY 4 HOURS PRN
Status: DISCONTINUED | OUTPATIENT
Start: 2024-03-19 | End: 2024-03-20 | Stop reason: HOSPADM

## 2024-03-19 RX ORDER — BUPIVACAINE HYDROCHLORIDE 5 MG/ML
INJECTION, SOLUTION EPIDURAL; INTRACAUDAL
Status: DISPENSED
Start: 2024-03-19 | End: 2024-03-19

## 2024-03-19 RX ORDER — HYDROMORPHONE HYDROCHLORIDE 1 MG/ML
INJECTION, SOLUTION INTRAMUSCULAR; INTRAVENOUS; SUBCUTANEOUS
Status: DISPENSED
Start: 2024-03-19 | End: 2024-03-19

## 2024-03-19 RX ORDER — ONDANSETRON HYDROCHLORIDE 2 MG/ML
4 INJECTION, SOLUTION INTRAVENOUS EVERY 4 HOURS PRN
Status: CANCELLED | OUTPATIENT
Start: 2024-03-19

## 2024-03-19 RX ORDER — IBUPROFEN 200 MG
400 TABLET ORAL AS NEEDED
Status: DISCONTINUED | OUTPATIENT
Start: 2024-03-19 | End: 2024-03-20 | Stop reason: HOSPADM

## 2024-03-19 RX ORDER — LIDOCAINE HYDROCHLORIDE 10 MG/ML
INJECTION INFILTRATION; PERINEURAL
Status: DISPENSED
Start: 2024-03-19 | End: 2024-03-19

## 2024-03-19 RX ORDER — MIDAZOLAM HYDROCHLORIDE 1 MG/ML
1 INJECTION, SOLUTION INTRAMUSCULAR; INTRAVENOUS AS NEEDED
Status: DISCONTINUED | OUTPATIENT
Start: 2024-03-19 | End: 2024-03-20 | Stop reason: HOSPADM

## 2024-03-19 RX ORDER — MIDAZOLAM HYDROCHLORIDE 1 MG/ML
1 INJECTION, SOLUTION INTRAMUSCULAR; INTRAVENOUS AS NEEDED
Status: CANCELLED | OUTPATIENT
Start: 2024-03-19

## 2024-03-19 RX ORDER — GADOTERATE MEGLUMINE 376.9 MG/ML
28 INJECTION INTRAVENOUS
Status: COMPLETED | OUTPATIENT
Start: 2024-03-19 | End: 2024-03-19

## 2024-03-19 RX ORDER — HYDROMORPHONE HYDROCHLORIDE 1 MG/ML
0.4 INJECTION, SOLUTION INTRAMUSCULAR; INTRAVENOUS; SUBCUTANEOUS AS NEEDED
Status: DISCONTINUED | OUTPATIENT
Start: 2024-03-19 | End: 2024-03-20 | Stop reason: HOSPADM

## 2024-03-19 RX ORDER — MIDAZOLAM HYDROCHLORIDE 1 MG/ML
0.5 INJECTION, SOLUTION INTRAMUSCULAR; INTRAVENOUS AS NEEDED
Status: CANCELLED | OUTPATIENT
Start: 2024-03-19

## 2024-03-19 RX ORDER — ACETAMINOPHEN 325 MG/1
650 TABLET ORAL EVERY 4 HOURS PRN
Status: CANCELLED | OUTPATIENT
Start: 2024-03-19

## 2024-03-19 RX ORDER — LIDOCAINE AND PRILOCAINE 25; 25 MG/G; MG/G
1 CREAM TOPICAL ONCE
Status: CANCELLED | OUTPATIENT
Start: 2024-03-19 | End: 2024-03-19

## 2024-03-19 RX ORDER — MIDAZOLAM HYDROCHLORIDE 1 MG/ML
INJECTION INTRAMUSCULAR; INTRAVENOUS
Status: DISPENSED
Start: 2024-03-19 | End: 2024-03-19

## 2024-03-19 RX ORDER — IBUPROFEN 200 MG
400 TABLET ORAL AS NEEDED
Status: CANCELLED | OUTPATIENT
Start: 2024-03-19

## 2024-03-19 RX ORDER — HYDROMORPHONE HYDROCHLORIDE 1 MG/ML
0.4 INJECTION, SOLUTION INTRAMUSCULAR; INTRAVENOUS; SUBCUTANEOUS AS NEEDED
Status: CANCELLED | OUTPATIENT
Start: 2024-03-19

## 2024-03-19 RX ADMIN — MIDAZOLAM 1 MG: 1 INJECTION INTRAMUSCULAR; INTRAVENOUS at 08:00

## 2024-03-19 RX ADMIN — HYDROMORPHONE HYDROCHLORIDE 0.2 MG: 0.2 INJECTION, SOLUTION INTRAMUSCULAR; INTRAVENOUS; SUBCUTANEOUS at 12:00

## 2024-03-19 RX ADMIN — LIDOCAINE AND PRILOCAINE 10 APPLICATION: 25; 25 CREAM TOPICAL at 07:00

## 2024-03-19 RX ADMIN — GADOTERATE MEGLUMINE 28 ML: 376.9 INJECTION INTRAVENOUS at 09:29

## 2024-03-19 RX ADMIN — HYDROMORPHONE HYDROCHLORIDE 0.4 MG: 1 INJECTION, SOLUTION INTRAMUSCULAR; INTRAVENOUS; SUBCUTANEOUS at 08:00

## 2024-03-19 RX ADMIN — MIDAZOLAM 0.5 MG: 1 INJECTION INTRAMUSCULAR; INTRAVENOUS at 12:00

## 2024-03-19 RX ADMIN — HYDROMORPHONE HYDROCHLORIDE 0.4 MG: 1 INJECTION, SOLUTION INTRAMUSCULAR; INTRAVENOUS; SUBCUTANEOUS at 09:00

## 2024-03-19 RX ADMIN — MIDAZOLAM 0.5 MG: 1 INJECTION INTRAMUSCULAR; INTRAVENOUS at 09:00

## 2024-03-19 ASSESSMENT — PAIN SCALES - GENERAL
PAINLEVEL: 0-NO PAIN
PAINLEVEL: 0-NO PAIN

## 2024-03-19 NOTE — PROGRESS NOTES
Radiation Oncology On Treatment Visit    Patient Name:  Janee Maddox  MRN:  [id number]  :  1950    Referring Provider: No ref. 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     Completed   A:LF62 (Started on 3/19/2024)   Most recent fraction: 2,200 cGy given on 3/19/2024   Total given: 2,200 cGy / 2,200 cGy  (1 of 1 fractions)   Elapsed Days: 0   Technique: Gamma-Knife        B: (Started on 3/19/2024)   Most recent fraction: 2,200 cGy given on 3/19/2024   Total given: 2,200 cGy / 2,200 cGy  (1 of 1 fractions)   Elapsed Days: 0   Technique: Gamma-Knife        C:LP97 (Started on 3/19/2024)   Most recent fraction: 2,200 cGy given on 3/19/2024   Total given: 2,200 cGy / 2,200 cGy  (1 of 1 fractions)   Elapsed Days: 0   Technique: Gamma-Knife                   SUBJECTIVE: Pt denies Headache/SOB; complains of AM queasiness, took antiemetic; states LE weakness due to chemo; wheelchair for distance      OBJECTIVE:   Vital Signs:  /60   Pulse 92   Resp 16   SpO2 92% Comment: RA   Pain Scale: The patient's current pain level was assessed.  They report currently having a pain of 0 out of 10.    Other Pertinent Findings:     Toxicity Assessment          3/19/2024    15:41   Toxicity Assessment   Adverse Events Reviewed  (WDL) No (Exceptions to WDL)   Treatment Site Brain   Anorexia Grade 0       drinking supplements   Anxiety Grade 0   Dehydration Grade 0       encouraged to drink more fluids   Depression Grade 0   Dermatitis Radiation Grade 0   Diarrhea Grade 0   Fatigue Grade 0   Fibrosis Deep Connective Tissue Grade 0   Fracture Grade 0   Nausea Grade 0   Pain Grade 0   Treatment Related Secondary Malignancy Grade 0   Tumor Pain Grade 0   Vomiting Grade 0   Hearing Impaired Grade 0   Middle Ear Inflammation Grade 0   Endocrine Disorders - Other, Specify Grade 0   Blurred Vision Grade 0   Dry Eye Grade 0   Eye Pain Grade 0   Optic Nerve Disorder Grade 0   Retinopathy Grade 0   Eye Disorders - Other, Specify Grade 0   Central Nervous System Necrosis Grade 0   Cognitive Disturbance Grade 0   Edema Cerebral Grade 0   Headache Grade 0   Ischemia Cerebrovascular Grade 0   Memory Impairment Grade 0   Seizure Grade 0        Assessment / Plan:  The patient is tolerating radiation therapy as anticipated.  Continue per current treatment plan.

## 2024-03-19 NOTE — PROGRESS NOTES
Patient ID: Janee Maddox is a 74 y.o. female.    Procedures    Date of Procedure:    3/18/2024     Procedure  Placement of stereotactic head frame  Stereotactic Radiosurgery of Intracranial lesions (3)    Primary Surgeon:   Fidelina Thompson MD     Radiation Oncologist:   Dameon Albert MD     Preoperative Diagnosis:   NSCLC to brain, brain metastases    Postoperative Diagnosis:   NSCLC to brain, brain metastases    History Of Present Illness  Janee Maddox is a 74 y.o. female with a history of  NSCLC to brain. Recent imaging showed intracranial lesions. The patient is dispositioned for stereotactic radiosurgery. The risk and benefits of radiosurgery were explained to the patient in detail, including risk of bleeding, seizure, radiation necrosis and frame placement. The patient is amenable to proceed.     Anesthesia:   Local anesthesia injected both superficially and deep.     Procedure in Detail:   The patient was take to the preoperative suite where the gamma knife frame was to be placed. The [place]ell gamma knife frame was placed over the patient's head. Two frontal and two occipital sites were marked to approximately where the pins would be placed. EMLA cream was placed on the frontal sites, then wiped off. The sites were cleansed with alcohol. Lidocaine was injected superficially and deep. The Leksell gamma knife was placed over the patient's head again. Two frontal and 2 occipital pins were placed. All pins were tightened with a hex wrench to appropriate torque of 0.45 nM. After placement of all the pins, we made sure the frame could not be moved and clear phantom was placed to ensure fit. The patient tolerated the procedure well.     Following placement of frame, the patient was taken to the MRI scanner where intravenous contrast dye was administered and 3 intracranial lesions were identified for treatment. The patient was taken back to the gamma knife suite where radiosurgery was to be carried  out. The treatment and dose plans were created by the radiation oncologist. The target volume of the lesion was outlined, dose prescribed and radiosurgical planning was carried out. The gamma knife plan software was used to set up radiation and  measures were performed without difficulty. The plan was signed and approved by me, the radiation oncologist, and the medical physicist. The patient was then taken into the gamma knife suite and the patient's head frame was secured to the table. The patient received radiosurgery without difficulty, after which the frame was removed and the patient was discharged the same day. Please see Radiation Oncology note by Dr. Albert for details of dosages.     Estimated Blood Loss:   Minimal     Complications:   None     Disposition:   Stable to recovery     Sites Treated:   Brain lesion(s) treated:   LF62  target received 22 Gy at the 80 % isodose line in 1 shot(s). Total volume: 0.012 cm3. 100 % of the tumor received the prescription isodose of 22 Gy at the 80 % isodose line.     LP97  target received 22 Gy at the 66 % isodose line in 1 shot(s). Total volume: 0.024 cm3. 99.7 % of the tumor received the prescription isodose of 22 Gy at the 66 % isodose line.       target received 22 Gy at the 66 % isodose line in 1 shot(s). Total volume: 0.023 cm3. 99.8 % of the tumor received the prescription isodose of 22 Gy at the 66 % isodose line.

## 2024-03-20 ENCOUNTER — APPOINTMENT (OUTPATIENT)
Dept: HEMATOLOGY/ONCOLOGY | Facility: CLINIC | Age: 74
End: 2024-03-20
Payer: MEDICARE

## 2024-03-21 NOTE — RADIATION COMPLETION NOTES
Radiation Oncology Completion Summary     ID: [id number]      Janee Maddox   presented to the Gamma knife suite this morning and the stereotactic frame was placed by Dr. Thompson . The patient was transferred to diagnostic radiology for an radiation treatment planning MRI brain w/wo contrast scan.    This imaging information was transferred back to the Gamma Plan workstation.  The MRI images were carefully reviewed and the multiple metastases  was carefully contoured. A highly conformal plan was developed. Careful consideration was given to the dose fall-off around normal brain tissue.  The plan was reviewed and approved by Dr. Thompson ,  Reinaldo Reyes(physicist) and by me. The patient was then brought to the Gamma knife suite and the treatment was delivered.      Technical Summary:  Region(s) Treated: multiple metastases  Radiation Dose/Technique: Gamma Knife Perfexion   22 Gy to 66-80 % isodose line  Total of 3 target irradiated with total number of shots: 3.   Total beam time 65 minutes.     Radiation Treatment Dates: 03/21/24   Total Elapsed Days: 1     Clinical Summary: The patient tolerated the procedure well with no acute complications, and was discharged home.     Followup/Disposition:  The patient will reachout to our office if he has any new acute side effects of Radiation Therapy. We will schedule a MRI of the brain with and without contrast to be done every 3 months with follow-up at that time for surveillance.      Thank you for allowing me to participate in the care of Janee Maddox     Dameon Albert MD MS  , Department of Radiation Oncology  [facility], [facility]

## 2024-03-22 ENCOUNTER — INFUSION (OUTPATIENT)
Dept: HEMATOLOGY/ONCOLOGY | Facility: CLINIC | Age: 74
End: 2024-03-22
Payer: MEDICARE

## 2024-03-22 DIAGNOSIS — C34.31 SQUAMOUS CELL CARCINOMA OF LOWER LOBE OF RIGHT LUNG (MULTI): ICD-10-CM

## 2024-03-22 LAB
ALBUMIN SERPL BCP-MCNC: 3.8 G/DL (ref 3.4–5)
ALP SERPL-CCNC: 82 U/L (ref 33–136)
ALT SERPL W P-5'-P-CCNC: 12 U/L (ref 7–45)
ANION GAP SERPL CALC-SCNC: 17 MMOL/L (ref 10–20)
AST SERPL W P-5'-P-CCNC: 14 U/L (ref 9–39)
BASOPHILS # BLD AUTO: 0.1 X10*3/UL (ref 0–0.1)
BASOPHILS NFR BLD AUTO: 1.6 %
BILIRUB SERPL-MCNC: 0.3 MG/DL (ref 0–1.2)
BUN SERPL-MCNC: 19 MG/DL (ref 6–23)
CALCIUM SERPL-MCNC: 9.4 MG/DL (ref 8.6–10.3)
CHLORIDE SERPL-SCNC: 101 MMOL/L (ref 98–107)
CO2 SERPL-SCNC: 24 MMOL/L (ref 21–32)
CREAT SERPL-MCNC: 1.42 MG/DL (ref 0.5–1.05)
DACRYOCYTES BLD QL SMEAR: NORMAL
EGFRCR SERPLBLD CKD-EPI 2021: 38 ML/MIN/1.73M*2
EOSINOPHIL # BLD AUTO: 1.77 X10*3/UL (ref 0–0.4)
EOSINOPHIL NFR BLD AUTO: 27.5 %
ERYTHROCYTE [DISTWIDTH] IN BLOOD BY AUTOMATED COUNT: 22 % (ref 11.5–14.5)
GLUCOSE SERPL-MCNC: 133 MG/DL (ref 74–99)
HCT VFR BLD AUTO: 31 % (ref 36–46)
HGB BLD-MCNC: 9.3 G/DL (ref 12–16)
IMM GRANULOCYTES # BLD AUTO: 0.07 X10*3/UL (ref 0–0.5)
IMM GRANULOCYTES NFR BLD AUTO: 1.1 % (ref 0–0.9)
LYMPHOCYTES # BLD AUTO: 1.36 X10*3/UL (ref 0.8–3)
LYMPHOCYTES NFR BLD AUTO: 21.2 %
MCH RBC QN AUTO: 26 PG (ref 26–34)
MCHC RBC AUTO-ENTMCNC: 30 G/DL (ref 32–36)
MCV RBC AUTO: 87 FL (ref 80–100)
MONOCYTES # BLD AUTO: 0.48 X10*3/UL (ref 0.05–0.8)
MONOCYTES NFR BLD AUTO: 7.5 %
NEUTROPHILS # BLD AUTO: 2.65 X10*3/UL (ref 1.6–5.5)
NEUTROPHILS NFR BLD AUTO: 41.1 %
NRBC BLD-RTO: 0 /100 WBCS (ref 0–0)
OVALOCYTES BLD QL SMEAR: NORMAL
PLATELET # BLD AUTO: 351 X10*3/UL (ref 150–450)
POLYCHROMASIA BLD QL SMEAR: NORMAL
POTASSIUM SERPL-SCNC: 4.1 MMOL/L (ref 3.5–5.3)
PROT SERPL-MCNC: 7.2 G/DL (ref 6.4–8.2)
RBC # BLD AUTO: 3.58 X10*6/UL (ref 4–5.2)
RBC MORPH BLD: NORMAL
SODIUM SERPL-SCNC: 138 MMOL/L (ref 136–145)
STOMATOCYTES BLD QL SMEAR: NORMAL
WBC # BLD AUTO: 6.4 X10*3/UL (ref 4.4–11.3)

## 2024-03-22 PROCEDURE — 84075 ASSAY ALKALINE PHOSPHATASE: CPT | Performed by: NURSE PRACTITIONER

## 2024-03-22 PROCEDURE — 85025 COMPLETE CBC W/AUTO DIFF WBC: CPT | Performed by: NURSE PRACTITIONER

## 2024-03-22 PROCEDURE — 96523 IRRIG DRUG DELIVERY DEVICE: CPT

## 2024-03-22 PROCEDURE — 2500000004 HC RX 250 GENERAL PHARMACY W/ HCPCS (ALT 636 FOR OP/ED): Performed by: INTERNAL MEDICINE

## 2024-03-22 PROCEDURE — 36591 DRAW BLOOD OFF VENOUS DEVICE: CPT

## 2024-03-22 RX ORDER — HEPARIN SODIUM,PORCINE/PF 10 UNIT/ML
50 SYRINGE (ML) INTRAVENOUS AS NEEDED
Status: CANCELLED | OUTPATIENT
Start: 2024-03-22

## 2024-03-22 RX ORDER — HEPARIN 100 UNIT/ML
500 SYRINGE INTRAVENOUS AS NEEDED
Status: CANCELLED | OUTPATIENT
Start: 2024-03-22

## 2024-03-22 RX ORDER — HEPARIN 100 UNIT/ML
500 SYRINGE INTRAVENOUS AS NEEDED
Status: DISCONTINUED | OUTPATIENT
Start: 2024-03-22 | End: 2024-03-22 | Stop reason: HOSPADM

## 2024-03-22 RX ADMIN — HEPARIN 500 UNITS: 100 SYRINGE at 14:12

## 2024-03-25 ENCOUNTER — INFUSION (OUTPATIENT)
Dept: HEMATOLOGY/ONCOLOGY | Facility: CLINIC | Age: 74
End: 2024-03-25
Payer: MEDICARE

## 2024-03-25 VITALS
BODY MASS INDEX: 26.56 KG/M2 | DIASTOLIC BLOOD PRESSURE: 75 MMHG | TEMPERATURE: 97.2 F | WEIGHT: 159.61 LBS | RESPIRATION RATE: 20 BRPM | OXYGEN SATURATION: 94 % | HEART RATE: 105 BPM | SYSTOLIC BLOOD PRESSURE: 130 MMHG

## 2024-03-25 DIAGNOSIS — C34.31 SQUAMOUS CELL CARCINOMA OF LOWER LOBE OF RIGHT LUNG (MULTI): ICD-10-CM

## 2024-03-25 PROCEDURE — 2500000001 HC RX 250 WO HCPCS SELF ADMINISTERED DRUGS (ALT 637 FOR MEDICARE OP): Performed by: INTERNAL MEDICINE

## 2024-03-25 PROCEDURE — 96375 TX/PRO/DX INJ NEW DRUG ADDON: CPT | Mod: INF

## 2024-03-25 PROCEDURE — 96413 CHEMO IV INFUSION 1 HR: CPT

## 2024-03-25 PROCEDURE — 96415 CHEMO IV INFUSION ADDL HR: CPT

## 2024-03-25 PROCEDURE — 2500000004 HC RX 250 GENERAL PHARMACY W/ HCPCS (ALT 636 FOR OP/ED): Performed by: INTERNAL MEDICINE

## 2024-03-25 PROCEDURE — 96367 TX/PROPH/DG ADDL SEQ IV INF: CPT

## 2024-03-25 PROCEDURE — 96417 CHEMO IV INFUS EACH ADDL SEQ: CPT

## 2024-03-25 RX ORDER — FAMOTIDINE 10 MG/ML
20 INJECTION INTRAVENOUS ONCE
Status: COMPLETED | OUTPATIENT
Start: 2024-03-25 | End: 2024-03-25

## 2024-03-25 RX ORDER — HEPARIN 100 UNIT/ML
500 SYRINGE INTRAVENOUS AS NEEDED
Status: CANCELLED | OUTPATIENT
Start: 2024-03-25

## 2024-03-25 RX ORDER — HEPARIN 100 UNIT/ML
500 SYRINGE INTRAVENOUS AS NEEDED
Status: DISCONTINUED | OUTPATIENT
Start: 2024-03-25 | End: 2024-03-25 | Stop reason: HOSPADM

## 2024-03-25 RX ORDER — SODIUM CHLORIDE 9 MG/ML
500 INJECTION, SOLUTION INTRAVENOUS CONTINUOUS
Status: CANCELLED | OUTPATIENT
Start: 2024-03-25

## 2024-03-25 RX ORDER — PALONOSETRON 0.05 MG/ML
0.25 INJECTION, SOLUTION INTRAVENOUS ONCE
Status: COMPLETED | OUTPATIENT
Start: 2024-03-25 | End: 2024-03-25

## 2024-03-25 RX ORDER — HEPARIN SODIUM,PORCINE/PF 10 UNIT/ML
50 SYRINGE (ML) INTRAVENOUS AS NEEDED
Status: CANCELLED | OUTPATIENT
Start: 2024-03-25

## 2024-03-25 RX ORDER — DIPHENHYDRAMINE HCL 50 MG
50 CAPSULE ORAL ONCE
Status: COMPLETED | OUTPATIENT
Start: 2024-03-25 | End: 2024-03-25

## 2024-03-25 RX ADMIN — DIPHENHYDRAMINE HYDROCHLORIDE 50 MG: 50 CAPSULE ORAL at 09:22

## 2024-03-25 RX ADMIN — FAMOTIDINE 20 MG: 10 INJECTION INTRAVENOUS at 09:23

## 2024-03-25 RX ADMIN — SODIUM CHLORIDE 200 MG: 9 INJECTION, SOLUTION INTRAVENOUS at 10:43

## 2024-03-25 RX ADMIN — CARBOPLATIN 300 MG: 10 INJECTION, SOLUTION INTRAVENOUS at 15:00

## 2024-03-25 RX ADMIN — PALONOSETRON 250 MCG: 0.05 INJECTION, SOLUTION INTRAVENOUS at 09:22

## 2024-03-25 RX ADMIN — HEPARIN 500 UNITS: 100 SYRINGE at 15:45

## 2024-03-25 RX ADMIN — SODIUM CHLORIDE 150 MG: 9 INJECTION, SOLUTION INTRAVENOUS at 09:19

## 2024-03-25 RX ADMIN — DEXAMETHASONE SODIUM PHOSPHATE 20 MG: 10 INJECTION, SOLUTION INTRAMUSCULAR; INTRAVENOUS at 10:11

## 2024-03-25 RX ADMIN — DEXTROSE MONOHYDRATE 366 MG: 50 INJECTION, SOLUTION INTRAVENOUS at 11:33

## 2024-03-25 ASSESSMENT — PAIN SCALES - GENERAL: PAINLEVEL: 0-NO PAIN

## 2024-03-25 NOTE — PROGRESS NOTES
Radiation Oncology Treatment Summary    Patient Name:  Janee Maddox  MRN:  [id number]  :  1950    Referring Provider: No ref. provider found  Primary Care Provider: Frances Cervantes MD    Brief History: Janee Maddox is a 74 y.o. female with No matching staging information was found for the patient..  The patient completed radiotherapy as outlined below.    Radiation Treatment Summary:    Radiation Treatments       Active   No active radiation treatments to show.     Completed   A:LF62 (Started on 3/19/2024)   Most recent fraction: 2,200 cGy given on 3/19/2024   Total given: 2,200 cGy / 2,200 cGy  (1 of 1 fractions)   Elapsed Days: 0   Technique: Gamma-Knife        B: (Started on 3/19/2024)   Most recent fraction: 2,200 cGy given on 3/19/2024   Total given: 2,200 cGy / 2,200 cGy  (1 of 1 fractions)   Elapsed Days: 0   Technique: Gamma-Knife        C:LP97 (Started on 3/19/2024)   Most recent fraction: 2,200 cGy given on 3/19/2024   Total given: 2,200 cGy / 2,200 cGy  (1 of 1 fractions)   Elapsed Days: 0   Technique: Gamma-Knife                     Please see the patient's Mosaiq chart for further details regarding the radiation plan, including beam energy.    Concurrent Chemotherapy:  Treatment Plans       Name Type Plan Dates Plan Provider         Active    Pembrolizumab + PACLitaxel / CARBOplatin, 21 Day Cycles Oncology Treatment  2024 - Present Prasanth Gonsales MD                    CTCAE Toxicity Overview:   Toxicity Assessment          3/19/2024    15:41   Toxicity Assessment   Adverse Events Reviewed (WDL) No (Exceptions to WDL)   Treatment Site Brain   Anorexia Grade 0       drinking supplements   Anxiety Grade 0   Dehydration Grade 0       encouraged to drink more fluids   Depression Grade 0   Dermatitis Radiation Grade 0   Diarrhea Grade 0   Fatigue Grade 0   Fibrosis Deep Connective Tissue Grade 0   Fracture Grade 0   Nausea Grade 0   Pain Grade 0   Treatment Related Secondary  Malignancy Grade 0   Tumor Pain Grade 0   Vomiting Grade 0   Hearing Impaired Grade 0   Middle Ear Inflammation Grade 0   Endocrine Disorders - Other, Specify Grade 0   Blurred Vision Grade 0   Dry Eye Grade 0   Eye Pain Grade 0   Optic Nerve Disorder Grade 0   Retinopathy Grade 0   Eye Disorders - Other, Specify Grade 0   Central Nervous System Necrosis Grade 0   Cognitive Disturbance Grade 0   Edema Cerebral Grade 0   Headache Grade 0   Ischemia Cerebrovascular Grade 0   Memory Impairment Grade 0   Seizure Grade 0     Patient Disposition: 3 month Volumetric MRI and follow up with Krystal Arnold NP, scheduled 6/20/24

## 2024-03-25 NOTE — SIGNIFICANT EVENT
03/25/24 0945   Prechemo Checklist   Has the patient been in the hospital, ED, or urgent care since last date of service No   Chemo/Immuno Consent Signed Yes  (1/24/24)   Protocol/Indications Verified Yes   Confirmed to previous date/time of medication Yes   Compared to previous dose Yes   All medications are dated accurately Yes   Pregnancy Test Negative Not applicable   Parameters Met Yes   BSA/Weight-Height Verified Yes   Dose Calculations Verified Yes

## 2024-03-27 ENCOUNTER — APPOINTMENT (OUTPATIENT)
Dept: HEMATOLOGY/ONCOLOGY | Facility: CLINIC | Age: 74
End: 2024-03-27
Payer: MEDICARE

## 2024-03-28 ENCOUNTER — INFUSION (OUTPATIENT)
Dept: HEMATOLOGY/ONCOLOGY | Facility: CLINIC | Age: 74
End: 2024-03-28
Payer: MEDICARE

## 2024-03-28 ENCOUNTER — APPOINTMENT (OUTPATIENT)
Dept: HEMATOLOGY/ONCOLOGY | Facility: CLINIC | Age: 74
End: 2024-03-28
Payer: MEDICARE

## 2024-03-28 VITALS
RESPIRATION RATE: 18 BRPM | SYSTOLIC BLOOD PRESSURE: 102 MMHG | TEMPERATURE: 97.3 F | OXYGEN SATURATION: 98 % | HEART RATE: 95 BPM | DIASTOLIC BLOOD PRESSURE: 57 MMHG

## 2024-03-28 DIAGNOSIS — C34.31 SQUAMOUS CELL CARCINOMA OF LOWER LOBE OF RIGHT LUNG (MULTI): ICD-10-CM

## 2024-03-28 DIAGNOSIS — C79.31 MALIGNANT NEOPLASM METASTATIC TO BRAIN (MULTI): ICD-10-CM

## 2024-03-28 PROCEDURE — 2500000004 HC RX 250 GENERAL PHARMACY W/ HCPCS (ALT 636 FOR OP/ED): Performed by: INTERNAL MEDICINE

## 2024-03-28 PROCEDURE — 96360 HYDRATION IV INFUSION INIT: CPT | Mod: INF

## 2024-03-28 RX ORDER — HEPARIN SODIUM,PORCINE/PF 10 UNIT/ML
50 SYRINGE (ML) INTRAVENOUS AS NEEDED
Status: CANCELLED | OUTPATIENT
Start: 2024-03-28

## 2024-03-28 RX ORDER — FAMOTIDINE 10 MG/ML
20 INJECTION INTRAVENOUS ONCE AS NEEDED
Status: CANCELLED | OUTPATIENT
Start: 2024-03-28

## 2024-03-28 RX ORDER — DIPHENHYDRAMINE HYDROCHLORIDE 50 MG/ML
50 INJECTION INTRAMUSCULAR; INTRAVENOUS AS NEEDED
Status: CANCELLED | OUTPATIENT
Start: 2024-03-28

## 2024-03-28 RX ORDER — SODIUM CHLORIDE 9 MG/ML
500 INJECTION, SOLUTION INTRAVENOUS CONTINUOUS
Status: CANCELLED | OUTPATIENT
Start: 2024-03-28

## 2024-03-28 RX ORDER — SODIUM CHLORIDE 9 MG/ML
500 INJECTION, SOLUTION INTRAVENOUS CONTINUOUS
Status: DISCONTINUED | OUTPATIENT
Start: 2024-03-28 | End: 2024-03-28 | Stop reason: HOSPADM

## 2024-03-28 RX ORDER — ALBUTEROL SULFATE 0.83 MG/ML
3 SOLUTION RESPIRATORY (INHALATION) AS NEEDED
Status: CANCELLED | OUTPATIENT
Start: 2024-03-28

## 2024-03-28 RX ORDER — HEPARIN 100 UNIT/ML
500 SYRINGE INTRAVENOUS AS NEEDED
Status: CANCELLED | OUTPATIENT
Start: 2024-03-28

## 2024-03-28 RX ORDER — EPINEPHRINE 0.3 MG/.3ML
0.3 INJECTION SUBCUTANEOUS EVERY 5 MIN PRN
Status: CANCELLED | OUTPATIENT
Start: 2024-03-28

## 2024-03-28 RX ORDER — HEPARIN SODIUM,PORCINE/PF 10 UNIT/ML
50 SYRINGE (ML) INTRAVENOUS AS NEEDED
Status: DISCONTINUED | OUTPATIENT
Start: 2024-03-28 | End: 2024-03-28 | Stop reason: HOSPADM

## 2024-03-28 RX ORDER — HEPARIN 100 UNIT/ML
500 SYRINGE INTRAVENOUS AS NEEDED
Status: DISCONTINUED | OUTPATIENT
Start: 2024-03-28 | End: 2024-03-28 | Stop reason: HOSPADM

## 2024-03-28 RX ADMIN — SODIUM CHLORIDE 500 ML/HR: 9 INJECTION, SOLUTION INTRAVENOUS at 14:45

## 2024-03-28 RX ADMIN — HEPARIN 500 UNITS: 100 SYRINGE at 15:22

## 2024-03-28 ASSESSMENT — PAIN SCALES - GENERAL: PAINLEVEL_OUTOF10: 0-NO PAIN

## 2024-03-29 ENCOUNTER — APPOINTMENT (OUTPATIENT)
Dept: HEMATOLOGY/ONCOLOGY | Facility: CLINIC | Age: 74
End: 2024-03-29
Payer: MEDICARE

## 2024-04-01 ENCOUNTER — APPOINTMENT (OUTPATIENT)
Dept: HEMATOLOGY/ONCOLOGY | Facility: CLINIC | Age: 74
End: 2024-04-01
Payer: MEDICARE

## 2024-04-03 ENCOUNTER — OFFICE VISIT (OUTPATIENT)
Dept: UROLOGY | Facility: CLINIC | Age: 74
End: 2024-04-03
Payer: MEDICARE

## 2024-04-03 VITALS
HEART RATE: 106 BPM | WEIGHT: 159 LBS | DIASTOLIC BLOOD PRESSURE: 64 MMHG | TEMPERATURE: 97.7 F | BODY MASS INDEX: 26.46 KG/M2 | SYSTOLIC BLOOD PRESSURE: 103 MMHG

## 2024-04-03 DIAGNOSIS — R35.0 FREQUENCY OF URINATION: ICD-10-CM

## 2024-04-03 DIAGNOSIS — N39.44 NOCTURNAL ENURESIS: ICD-10-CM

## 2024-04-03 DIAGNOSIS — N95.8 GENITOURINARY SYNDROME OF MENOPAUSE: ICD-10-CM

## 2024-04-03 DIAGNOSIS — N30.00 ACUTE CYSTITIS WITHOUT HEMATURIA: Primary | ICD-10-CM

## 2024-04-03 LAB
APPEARANCE UR: ABNORMAL
BACTERIA #/AREA URNS AUTO: ABNORMAL /HPF
BILIRUB UR STRIP.AUTO-MCNC: NEGATIVE MG/DL
CAOX CRY #/AREA UR COMP ASSIST: ABNORMAL /HPF
COLOR UR: ABNORMAL
GLUCOSE UR STRIP.AUTO-MCNC: NORMAL MG/DL
KETONES UR STRIP.AUTO-MCNC: NEGATIVE MG/DL
LEUKOCYTE ESTERASE UR QL STRIP.AUTO: ABNORMAL
NITRITE UR QL STRIP.AUTO: ABNORMAL
PH UR STRIP.AUTO: 5.5 [PH]
PROT UR STRIP.AUTO-MCNC: ABNORMAL MG/DL
RBC # UR STRIP.AUTO: ABNORMAL /UL
RBC #/AREA URNS AUTO: ABNORMAL /HPF
SP GR UR STRIP.AUTO: 1.02
SQUAMOUS #/AREA URNS AUTO: ABNORMAL /HPF
UROBILINOGEN UR STRIP.AUTO-MCNC: NORMAL MG/DL
WBC #/AREA URNS AUTO: >50 /HPF
WBC CLUMPS #/AREA URNS AUTO: ABNORMAL /HPF

## 2024-04-03 PROCEDURE — 1159F MED LIST DOCD IN RCRD: CPT | Performed by: NURSE PRACTITIONER

## 2024-04-03 PROCEDURE — 81001 URINALYSIS AUTO W/SCOPE: CPT

## 2024-04-03 PROCEDURE — 99213 OFFICE O/P EST LOW 20 MIN: CPT | Performed by: NURSE PRACTITIONER

## 2024-04-03 RX ORDER — ESTRADIOL 0.1 MG/G
CREAM VAGINAL
Qty: 42.5 G | Refills: 5 | Status: SHIPPED | OUTPATIENT
Start: 2024-04-03 | End: 2024-04-03 | Stop reason: SDUPTHER

## 2024-04-03 RX ORDER — SOLIFENACIN SUCCINATE 5 MG/1
5 TABLET, FILM COATED ORAL DAILY
Qty: 90 TABLET | Refills: 3 | Status: SHIPPED | OUTPATIENT
Start: 2024-04-03 | End: 2024-04-03 | Stop reason: SDUPTHER

## 2024-04-03 RX ORDER — GRANULES FOR ORAL 3 G/1
POWDER ORAL
Qty: 9 G | Refills: 0 | Status: SHIPPED | OUTPATIENT
Start: 2024-04-03 | End: 2024-04-19 | Stop reason: SDUPTHER

## 2024-04-03 RX ORDER — ESTRADIOL 0.1 MG/G
CREAM VAGINAL
Qty: 42.5 G | Refills: 5 | Status: SHIPPED | OUTPATIENT
Start: 2024-04-03 | End: 2025-04-03

## 2024-04-03 RX ORDER — SODIUM CHLORIDE 9 MG/ML
500 INJECTION, SOLUTION INTRAVENOUS CONTINUOUS
Status: CANCELLED | OUTPATIENT
Start: 2024-04-03

## 2024-04-03 RX ORDER — SOLIFENACIN SUCCINATE 5 MG/1
5 TABLET, FILM COATED ORAL DAILY
Qty: 90 TABLET | Refills: 3 | Status: SHIPPED | OUTPATIENT
Start: 2024-04-03 | End: 2025-04-03

## 2024-04-03 NOTE — PROGRESS NOTES
04/03/24   [id number]    Chief Complaint   Patient presents with    chronic uti      UTI symptoms  Subjective      HPI Janee Maddox is a 74 y.o. female who presents for UTI symptoms, hx OAB managed w solifenacin 5 mg daily. Last seen 9/27/23. At that time using vaginal estrogen cream;     Loose bowels 4 days, nocturnal enuresis, pad wet, not the bed, nocturia 4 x, hasn't had refills on solifenacin 5 mg for a bit;     Noticed other day starting dysuria; difficult UTI last year in November, borderline septic, [facility] IV abx, then dx w lung cancer; difficult to clear up; requesting abx today; now experiencing nocturnal enuresis as well.     Going thru chemotherapy now, tomorrow hydration; urine thick in office, we couldn't run on machine;    PVR 5 cc    3/22/24 Creatinine 1.42  1/24/24 Ecoli + urine culture ESBL    Daughter Ronna Simpson [phone]        Objective     /64   Pulse 106   Temp 36.5 °C (97.7 °F)   Wt 72.1 kg (159 lb)   BMI 26.46 kg/m²    Physical Exam  General: Appears comfortable and in no apparent distress, well nourished  Head: Normocephalic, atraumatic  Neck: trachea midline  Respiratory: respirations unlabored, no wheezes, and no use of accessory muscles  Cardiovascular: at rest no dyspnea, well perfused  Skin: no visible rashes or lesions  Neurologic: grossly intact, oriented to person, place, and time  Psychiatric: mood and affect appropriate  Musculoskeletal: in chair for appt. no difficulty w upper body movement      Assessment/Plan   Problem List Items Addressed This Visit          Genitourinary and Reproductive    Genitourinary syndrome of menopause    Relevant Medications    estradiol (Estrace) 0.01 % (0.1 mg/gram) vaginal cream    UTI (urinary tract infection) - Primary    Relevant Medications    fosfomycin (Monurol) 3 gram packet    Other Relevant Orders    Post-Void Residual (Completed)    Urine culture    Urinalysis with Reflex Microscopic     Other Visit Diagnoses        Frequency of urination        Relevant Orders    Post-Void Residual (Completed)    Urine culture    Urinalysis with Reflex Microscopic    Nocturnal enuresis        Relevant Medications    solifenacin (VESIcare) 5 mg tablet          Orders Placed This Encounter   Procedures    Post-Void Residual    Urine culture     Standing Status:   Future     Standing Expiration Date:   4/10/2024     Order Specific Question:   Release result to MyChart     Answer:   Immediate [1]    Urinalysis with Reflex Microscopic     Standing Status:   Future     Standing Expiration Date:   4/3/2025     Order Specific Question:   Release result to MyChart     Answer:   Immediate [1]      Psyllium fiber capsules, powder cookies helps w diarrhea or constipation  Solifenacin 5 mg daily  Dr. Schroeder is her ID doctor  Fosfomycin sent today as requested  Notify Oncologist Dr. Gonsales  Nurse line [phone]  Follow up 6 mos parma    Daughter Ronna Simpson [phone]     Discussed cystoscopy w Dr. Ware, might consider not ready now  Elvira Suarez, APRN-CNP  Lab Results   Component Value Date    GLUCOSE 133 (H) 03/22/2024    CALCIUM 9.4 03/22/2024     03/22/2024    K 4.1 03/22/2024    CO2 24 03/22/2024     03/22/2024    BUN 19 03/22/2024    CREATININE 1.42 (H) 03/22/2024

## 2024-04-03 NOTE — PATIENT INSTRUCTIONS
Psyllium fiber capsules, powder cookies helps w diarrhea or constipation  Solifenacin 5 mg daily  Dr. Schroeder is her ID doctor  Fosfomycin sent today as requested  Notify Oncologist Dr. Gonsales  Nurse line [phone]  Follow up 6 mos parma    Daughter Ronna Simpson [phone]  Discussed cystoscopy w Dr. Ware, might consider not ready now

## 2024-04-04 ENCOUNTER — INFUSION (OUTPATIENT)
Dept: HEMATOLOGY/ONCOLOGY | Facility: CLINIC | Age: 74
End: 2024-04-04
Payer: MEDICARE

## 2024-04-04 VITALS
SYSTOLIC BLOOD PRESSURE: 128 MMHG | HEART RATE: 92 BPM | OXYGEN SATURATION: 94 % | TEMPERATURE: 96.8 F | RESPIRATION RATE: 18 BRPM | WEIGHT: 162.26 LBS | BODY MASS INDEX: 27 KG/M2 | DIASTOLIC BLOOD PRESSURE: 71 MMHG

## 2024-04-04 DIAGNOSIS — C79.31 MALIGNANT NEOPLASM METASTATIC TO BRAIN (MULTI): ICD-10-CM

## 2024-04-04 DIAGNOSIS — C34.31 SQUAMOUS CELL CARCINOMA OF LOWER LOBE OF RIGHT LUNG (MULTI): ICD-10-CM

## 2024-04-04 PROCEDURE — 2500000004 HC RX 250 GENERAL PHARMACY W/ HCPCS (ALT 636 FOR OP/ED): Performed by: INTERNAL MEDICINE

## 2024-04-04 PROCEDURE — 96523 IRRIG DRUG DELIVERY DEVICE: CPT

## 2024-04-04 PROCEDURE — 96361 HYDRATE IV INFUSION ADD-ON: CPT | Mod: INF

## 2024-04-04 PROCEDURE — 96360 HYDRATION IV INFUSION INIT: CPT | Mod: INF

## 2024-04-04 RX ORDER — DIPHENHYDRAMINE HYDROCHLORIDE 50 MG/ML
50 INJECTION INTRAMUSCULAR; INTRAVENOUS AS NEEDED
Status: DISCONTINUED | OUTPATIENT
Start: 2024-04-04 | End: 2024-04-04 | Stop reason: HOSPADM

## 2024-04-04 RX ORDER — SODIUM CHLORIDE 9 MG/ML
500 INJECTION, SOLUTION INTRAVENOUS CONTINUOUS
Status: CANCELLED | OUTPATIENT
Start: 2024-04-04

## 2024-04-04 RX ORDER — EPINEPHRINE 0.3 MG/.3ML
0.3 INJECTION SUBCUTANEOUS EVERY 5 MIN PRN
Status: CANCELLED | OUTPATIENT
Start: 2024-04-04

## 2024-04-04 RX ORDER — FAMOTIDINE 10 MG/ML
20 INJECTION INTRAVENOUS ONCE AS NEEDED
Status: DISCONTINUED | OUTPATIENT
Start: 2024-04-04 | End: 2024-04-04 | Stop reason: HOSPADM

## 2024-04-04 RX ORDER — ALBUTEROL SULFATE 0.83 MG/ML
3 SOLUTION RESPIRATORY (INHALATION) AS NEEDED
Status: CANCELLED | OUTPATIENT
Start: 2024-04-04

## 2024-04-04 RX ORDER — DIPHENHYDRAMINE HYDROCHLORIDE 50 MG/ML
50 INJECTION INTRAMUSCULAR; INTRAVENOUS AS NEEDED
Status: CANCELLED | OUTPATIENT
Start: 2024-04-04

## 2024-04-04 RX ORDER — ALBUTEROL SULFATE 0.83 MG/ML
3 SOLUTION RESPIRATORY (INHALATION) AS NEEDED
Status: DISCONTINUED | OUTPATIENT
Start: 2024-04-04 | End: 2024-04-04 | Stop reason: HOSPADM

## 2024-04-04 RX ORDER — SODIUM CHLORIDE 9 MG/ML
500 INJECTION, SOLUTION INTRAVENOUS CONTINUOUS
Status: DISCONTINUED | OUTPATIENT
Start: 2024-04-04 | End: 2024-04-04 | Stop reason: HOSPADM

## 2024-04-04 RX ORDER — FAMOTIDINE 10 MG/ML
20 INJECTION INTRAVENOUS ONCE AS NEEDED
Status: CANCELLED | OUTPATIENT
Start: 2024-04-04

## 2024-04-04 RX ORDER — EPINEPHRINE 0.3 MG/.3ML
0.3 INJECTION SUBCUTANEOUS EVERY 5 MIN PRN
Status: DISCONTINUED | OUTPATIENT
Start: 2024-04-04 | End: 2024-04-04 | Stop reason: HOSPADM

## 2024-04-04 RX ADMIN — SODIUM CHLORIDE 500 ML/HR: 9 INJECTION, SOLUTION INTRAVENOUS at 13:04

## 2024-04-04 ASSESSMENT — PAIN SCALES - GENERAL: PAINLEVEL: 0-NO PAIN

## 2024-04-10 ENCOUNTER — DOCUMENTATION (OUTPATIENT)
Dept: BEHAVIORAL HEALTH | Facility: CLINIC | Age: 74
End: 2024-04-10
Payer: MEDICARE

## 2024-04-10 DIAGNOSIS — F41.1 GAD (GENERALIZED ANXIETY DISORDER): ICD-10-CM

## 2024-04-10 RX ORDER — LORAZEPAM 1 MG/1
1 TABLET ORAL 2 TIMES DAILY
Qty: 10 TABLET | Refills: 0 | Status: SHIPPED | OUTPATIENT
Start: 2024-04-10 | End: 2024-04-17 | Stop reason: WASHOUT

## 2024-04-10 NOTE — PROGRESS NOTES
Nonbillable note : care coordination with oncology sent note , pcp has not seen patient recently , sent a five day script for lorazepam for now , until I hear back from oncology , will reassess before she is out and sent staff message related to schedule need , she is out of compliance for appointment timing at this point , needs sees asap kpacer cns

## 2024-04-12 ENCOUNTER — OFFICE VISIT (OUTPATIENT)
Dept: HEMATOLOGY/ONCOLOGY | Facility: CLINIC | Age: 74
End: 2024-04-12
Payer: MEDICARE

## 2024-04-12 ENCOUNTER — INFUSION (OUTPATIENT)
Dept: HEMATOLOGY/ONCOLOGY | Facility: CLINIC | Age: 74
End: 2024-04-12
Payer: MEDICARE

## 2024-04-12 VITALS
BODY MASS INDEX: 26.78 KG/M2 | WEIGHT: 160.94 LBS | HEART RATE: 100 BPM | DIASTOLIC BLOOD PRESSURE: 59 MMHG | SYSTOLIC BLOOD PRESSURE: 112 MMHG | RESPIRATION RATE: 20 BRPM | TEMPERATURE: 97.5 F | OXYGEN SATURATION: 97 %

## 2024-04-12 DIAGNOSIS — C34.31 SQUAMOUS CELL CARCINOMA OF LOWER LOBE OF RIGHT LUNG (MULTI): ICD-10-CM

## 2024-04-12 LAB
ALBUMIN SERPL BCP-MCNC: 3.9 G/DL (ref 3.4–5)
ALP SERPL-CCNC: 69 U/L (ref 33–136)
ALT SERPL W P-5'-P-CCNC: 16 U/L (ref 7–45)
ANION GAP SERPL CALC-SCNC: 14 MMOL/L (ref 10–20)
AST SERPL W P-5'-P-CCNC: 15 U/L (ref 9–39)
BASOPHILS # BLD AUTO: 0.05 X10*3/UL (ref 0–0.1)
BASOPHILS NFR BLD AUTO: 1.1 %
BILIRUB SERPL-MCNC: 0.3 MG/DL (ref 0–1.2)
BUN SERPL-MCNC: 27 MG/DL (ref 6–23)
CALCIUM SERPL-MCNC: 9.1 MG/DL (ref 8.6–10.3)
CHLORIDE SERPL-SCNC: 103 MMOL/L (ref 98–107)
CO2 SERPL-SCNC: 25 MMOL/L (ref 21–32)
CREAT SERPL-MCNC: 1.31 MG/DL (ref 0.5–1.05)
DACRYOCYTES BLD QL SMEAR: NORMAL
EGFRCR SERPLBLD CKD-EPI 2021: 42 ML/MIN/1.73M*2
EOSINOPHIL # BLD AUTO: 0.23 X10*3/UL (ref 0–0.4)
EOSINOPHIL NFR BLD AUTO: 5.3 %
ERYTHROCYTE [DISTWIDTH] IN BLOOD BY AUTOMATED COUNT: 22.5 % (ref 11.5–14.5)
GLUCOSE SERPL-MCNC: 153 MG/DL (ref 74–99)
HCT VFR BLD AUTO: 29.1 % (ref 36–46)
HGB BLD-MCNC: 9.1 G/DL (ref 12–16)
IMM GRANULOCYTES # BLD AUTO: 0.02 X10*3/UL (ref 0–0.5)
IMM GRANULOCYTES NFR BLD AUTO: 0.5 % (ref 0–0.9)
LYMPHOCYTES # BLD AUTO: 0.93 X10*3/UL (ref 0.8–3)
LYMPHOCYTES NFR BLD AUTO: 21.4 %
MCH RBC QN AUTO: 28.2 PG (ref 26–34)
MCHC RBC AUTO-ENTMCNC: 31.3 G/DL (ref 32–36)
MCV RBC AUTO: 90 FL (ref 80–100)
MONOCYTES # BLD AUTO: 0.32 X10*3/UL (ref 0.05–0.8)
MONOCYTES NFR BLD AUTO: 7.4 %
NEUTROPHILS # BLD AUTO: 2.8 X10*3/UL (ref 1.6–5.5)
NEUTROPHILS NFR BLD AUTO: 64.3 %
NRBC BLD-RTO: 0 /100 WBCS (ref 0–0)
OVALOCYTES BLD QL SMEAR: NORMAL
PLATELET # BLD AUTO: 139 X10*3/UL (ref 150–450)
POLYCHROMASIA BLD QL SMEAR: NORMAL
POTASSIUM SERPL-SCNC: 4.4 MMOL/L (ref 3.5–5.3)
PROT SERPL-MCNC: 6.7 G/DL (ref 6.4–8.2)
RBC # BLD AUTO: 3.23 X10*6/UL (ref 4–5.2)
RBC MORPH BLD: NORMAL
SODIUM SERPL-SCNC: 138 MMOL/L (ref 136–145)
TSH SERPL-ACNC: 1.19 MIU/L (ref 0.44–3.98)
WBC # BLD AUTO: 4.4 X10*3/UL (ref 4.4–11.3)

## 2024-04-12 PROCEDURE — 84443 ASSAY THYROID STIM HORMONE: CPT

## 2024-04-12 PROCEDURE — 1159F MED LIST DOCD IN RCRD: CPT | Performed by: INTERNAL MEDICINE

## 2024-04-12 PROCEDURE — 99214 OFFICE O/P EST MOD 30 MIN: CPT | Performed by: INTERNAL MEDICINE

## 2024-04-12 PROCEDURE — 2500000004 HC RX 250 GENERAL PHARMACY W/ HCPCS (ALT 636 FOR OP/ED): Performed by: INTERNAL MEDICINE

## 2024-04-12 PROCEDURE — 84075 ASSAY ALKALINE PHOSPHATASE: CPT

## 2024-04-12 PROCEDURE — 36591 DRAW BLOOD OFF VENOUS DEVICE: CPT

## 2024-04-12 PROCEDURE — 85025 COMPLETE CBC W/AUTO DIFF WBC: CPT

## 2024-04-12 PROCEDURE — 1125F AMNT PAIN NOTED PAIN PRSNT: CPT | Performed by: INTERNAL MEDICINE

## 2024-04-12 PROCEDURE — 96523 IRRIG DRUG DELIVERY DEVICE: CPT

## 2024-04-12 RX ORDER — PROCHLORPERAZINE MALEATE 10 MG
10 TABLET ORAL EVERY 6 HOURS PRN
Status: CANCELLED | OUTPATIENT
Start: 2024-04-15

## 2024-04-12 RX ORDER — HEPARIN SODIUM,PORCINE/PF 10 UNIT/ML
50 SYRINGE (ML) INTRAVENOUS AS NEEDED
Status: CANCELLED | OUTPATIENT
Start: 2024-04-12

## 2024-04-12 RX ORDER — HEPARIN 100 UNIT/ML
500 SYRINGE INTRAVENOUS AS NEEDED
Status: DISCONTINUED | OUTPATIENT
Start: 2024-04-12 | End: 2024-04-12 | Stop reason: HOSPADM

## 2024-04-12 RX ORDER — PROCHLORPERAZINE EDISYLATE 5 MG/ML
10 INJECTION INTRAMUSCULAR; INTRAVENOUS EVERY 6 HOURS PRN
Status: CANCELLED | OUTPATIENT
Start: 2024-04-15

## 2024-04-12 RX ORDER — HEPARIN 100 UNIT/ML
500 SYRINGE INTRAVENOUS AS NEEDED
Status: CANCELLED | OUTPATIENT
Start: 2024-04-12

## 2024-04-12 RX ADMIN — HEPARIN 500 UNITS: 100 SYRINGE at 12:52

## 2024-04-12 ASSESSMENT — PAIN SCALES - GENERAL: PAINLEVEL: 2

## 2024-04-12 NOTE — PROGRESS NOTES
Patient ID: : Janee Maddox            Primary Care Provider: Frances Cervantes MD    LOCATION:  [facility]    HEMATOLOGY ONCOLOGY PROBLEMS:  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  TP53 mutated.        a. Ist line therapy with carboplatin/Taxol/Keytruda beginning Feb 2024.        b. S/p stereotactic radiation to the brain lesion in March 2024.    CHIEF COMPLAINTS:  Patient is in the clinic today for evaluation of right lung squamous cell carcinoma and for continuation of the chemotherapy and management of therapy-related effects. .    HISTORY:  Janee Maddox is a 74 y.o. female with right lung squamous cell carcinoma.  She is a lifelong smoker and was admitted at [facility] in Nov 2023 with complaining of shortness of breath, chest discomfort and positive bacteremia.  During the evaluation she was noted to have a large cavitary lesion in the right lung along with pleural effusion.  CT scan at that time showed a 6.1 x 5 cm right lower lobe cavitary lung mass along with loculated right-sided pleural effusion and prominent mediastinal and hilar lymphadenopathy.  Overall findings are concerning for either abscess or baseline malignancy.  She was transferred to [facility] where a CT-guided biopsy confirmed Invasive squamous cell carcinoma.  PD-L1 expression was 5%.  Tumor NGS panel was mainly suggestive of TP53 mutation.  During hospitalization her clinical course was also complicated by Pantoea bacteremia and an ESBL Ecoli UTI.  She was treated with antibiotics and other supportive care.  A follow-up PET scan from Jan 2024 confirmed increased metabolic activity in the right lower lobe pleural-based cavitary mass along with mediastinal and right hilar lymphadenopathy and right-sided pleural disease consistent with known malignancy.  There was no evidence of distant metastatic disease.  Brain MRI showed small solitary left parietal  metastatic lesion and she is s/p stereotactic radiation treatment in 2024.  She is currently being treated with first-line therapy with carbo/Taxol/Keytruda.     INTERVAL HISTORY:  So far she has received 2 cycles of chemotherapy.  Clinical course has been complicated by worsening weakness, fatigue, dehydration and concern regarding recurrent UTIs.   Today she is feeling slightly better but still has issues with persistent weakness and fatigue.  Since her last visit she also received stereotactic radiation therapy to the cranial lesion on 3/19/2022 by Dr. Albert.    PAST MEDICAL HISTORY:  1.  Right lower lobe squamous cell lung cancer as detailed above.  2.  Coronary artery disease  3.  Hypertension  4.  Hyperlipidemia  5.  GERD  6.  Anxiety/depression  7.  History of C. difficile colitis  8.  E. coli UTI.  9.  History of complete hysterectomy, cholecystectomy and incisional hernia surgeries    SOCIAL HISTORY:  She lives alone in [place].  Quit smoking in 2023.  1 pack a day for 60 years prior smoking history.  Admit to occasional alcohol intake.  She work as a tax preparor.  Born and raised in [place].    FAMILY HISTORY:  Father  at age 86 from coronary artery disease. Mother also  at age 86 from multiple medical issues.  She had 1 sister and 4/2 siblings.  One of them  from myocardial infarction.  3 children, 7 grandkids and 1 great grand kid ~ all alive and well.  No other family history of bleeding, clotting or malignant disorders in the family history.    REVIEW OF SYSTEMS:   Pertinent finding as per the history above.  All other systems have been reviewed and generally negative and noncontributory.    VITAL SIGNS  BSA: 1.83 meters squared  /59   Pulse 100   Temp 36.4 °C (97.5 °F) (Temporal)   Resp 20   Wt 73 kg (160 lb 15 oz)   SpO2 97%   BMI 26.78 kg/m²     PHYSICAL EXAMINATION:  Detailed physical examination not done.    LAB DATA:  CBC from today shows a hemoglobin  of 9.1 with MCV of 90.  White cell count is normal at 4.4 and platelets are 139K.  Metabolic profile is unremarkable other than creatinine of 1.3.      RADIOLOGICAL DATA:  CT and PET scan results were reviewed and have been detailed in the history above.    Brain MRI 02/10/2024  Impression:  There is a 3 mm focus of enhancement within the posterior frontal lobe on the left worrisome for an intracranial metastatic lesion. There is no associated mass effect. No midline shift.      There is a 1 mm focus of increased signal within the right parietal lobe on the diffusion-weighted images which may represent a recent infarct versus artifact. No definite associated enhancement.      Volume loss and mild degree of nonspecific white matter signal compatible with microangiopathy. 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    PATHOLOGY DATA:  Surgical Pathology Exam: [id number]   FINAL DIAGNOSIS   A. LUNG, RIGHT; BIOPSY:    -- INVASIVE SQUAMOUS CELL CARCINOMA, KERATINIZING. See comment   Electronically signed by Lino Carrillo MD on 12/1/2023      PD-L1 22C3  (KEYTRUDA)Tumor Proportion Score (TPS): 5%   MICROSATELLITE STATUS: Microsatellite Instability-High (MSI-H) is NOT DETECTED.  DISEASE ASSOCIATED GENOMIC FINDINGS:   TP53 p.G245V ([id number] c.734G>T)  DISEASE RELEVANT ALTERATIONS NOT DETECTED:   Negative for ALK fusion.  Negative for BRAF V600E.  Negative for EGFR sensitizing mutation.  Negative for ERBB2 activating mutation  Negative for KRAS G12C.  Negative for MET exon 14 skipping mutation.  Negative for NTRK fusion.  Negative for RET fusion.  Negative for ROS1 fusion.    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  TP53 mutated.  Please refer to the details of initial  presentation and management as outlined above.  In summary patient is a lifelong smoker and was noted to have a large cavitary right lower lung pleural-based lesion along with extensive mediastinal and hilar  lymphadenopathy and loculated pleural effusion.  CT-guided biopsy from November 2023 was confirmatory of invasive squamous cell carcinoma.  PD-L1 TPS score is 5%.  NGS panel was unremarkable other than finding of TP53 mutation.  Brain MRI showed a small 3 mm left frontal lobe lesion concerning for metastatic disease.  She is currently being treated with first-line therapy with carbo/Taxol/Keytruda.  There are also plans for her to have stereotactic brain radiation.    Again, long discussion with the patient and she is explained about the diagnosis, stage, prognosis and treatment/management option.  In view of the pleural-based involvement and brain mets she has stage IV disease and is not a surgical candidate.  For now she will continue with combination of carboplatin/Taxol/Keytruda.  Probable benefit and side effect profile of mainly myelosuppression, hair loss, weakness, fatigue, neurotoxicity, nephrotoxicity, colitis, pneumonitis, endocrinopathies etc. were explained to them in detail.  Her overall prognosis is guarded.      2.  Brain mets.  MRI results were suggestive of small solitary left parietal lobe lesion.  She is s/p stereotactic radiation therapy in Mar 2024.    3.  Anxiety/depression.  Patient seems to have significant baseline anxiety. I think she will also benefit from evaluation with psycho-oncology team.    4.  Follow-up.  Follow-up with me in 3 weeks.  In the meantime she will come to the clinic for continuation of the treatment as per the protocol.      This note has been transcribed using Dragon voice recognition system and there is a possibility of unintentional typing misprints.

## 2024-04-15 ENCOUNTER — INFUSION (OUTPATIENT)
Dept: HEMATOLOGY/ONCOLOGY | Facility: CLINIC | Age: 74
End: 2024-04-15
Payer: MEDICARE

## 2024-04-15 VITALS
WEIGHT: 161.6 LBS | TEMPERATURE: 97.7 F | OXYGEN SATURATION: 95 % | SYSTOLIC BLOOD PRESSURE: 127 MMHG | RESPIRATION RATE: 18 BRPM | BODY MASS INDEX: 26.89 KG/M2 | DIASTOLIC BLOOD PRESSURE: 71 MMHG | HEART RATE: 108 BPM

## 2024-04-15 DIAGNOSIS — C34.31 SQUAMOUS CELL CARCINOMA OF LOWER LOBE OF RIGHT LUNG (MULTI): Primary | ICD-10-CM

## 2024-04-15 PROCEDURE — 96417 CHEMO IV INFUS EACH ADDL SEQ: CPT

## 2024-04-15 PROCEDURE — 96375 TX/PRO/DX INJ NEW DRUG ADDON: CPT | Mod: INF

## 2024-04-15 PROCEDURE — 96413 CHEMO IV INFUSION 1 HR: CPT

## 2024-04-15 PROCEDURE — 96367 TX/PROPH/DG ADDL SEQ IV INF: CPT

## 2024-04-15 PROCEDURE — 2500000001 HC RX 250 WO HCPCS SELF ADMINISTERED DRUGS (ALT 637 FOR MEDICARE OP): Performed by: INTERNAL MEDICINE

## 2024-04-15 PROCEDURE — 2500000005 HC RX 250 GENERAL PHARMACY W/O HCPCS: Performed by: INTERNAL MEDICINE

## 2024-04-15 PROCEDURE — 2500000004 HC RX 250 GENERAL PHARMACY W/ HCPCS (ALT 636 FOR OP/ED): Performed by: INTERNAL MEDICINE

## 2024-04-15 PROCEDURE — 96415 CHEMO IV INFUSION ADDL HR: CPT

## 2024-04-15 RX ORDER — DEXAMETHASONE IN 0.9 % SOD CHL 20 MG/50ML
20 INTRAVENOUS SOLUTION, PIGGYBACK (ML) INTRAVENOUS ONCE
Status: COMPLETED | OUTPATIENT
Start: 2024-04-15 | End: 2024-04-15

## 2024-04-15 RX ORDER — HEPARIN SODIUM,PORCINE/PF 10 UNIT/ML
50 SYRINGE (ML) INTRAVENOUS AS NEEDED
Status: CANCELLED | OUTPATIENT
Start: 2024-04-15

## 2024-04-15 RX ORDER — DIPHENHYDRAMINE HCL 50 MG
50 CAPSULE ORAL ONCE
Status: COMPLETED | OUTPATIENT
Start: 2024-04-15 | End: 2024-04-15

## 2024-04-15 RX ORDER — HEPARIN 100 UNIT/ML
500 SYRINGE INTRAVENOUS AS NEEDED
Status: CANCELLED | OUTPATIENT
Start: 2024-04-15

## 2024-04-15 RX ORDER — PALONOSETRON 0.05 MG/ML
0.25 INJECTION, SOLUTION INTRAVENOUS ONCE
Status: COMPLETED | OUTPATIENT
Start: 2024-04-15 | End: 2024-04-15

## 2024-04-15 RX ORDER — EPINEPHRINE 0.3 MG/.3ML
0.3 INJECTION SUBCUTANEOUS EVERY 5 MIN PRN
Status: DISCONTINUED | OUTPATIENT
Start: 2024-04-15 | End: 2024-04-15 | Stop reason: HOSPADM

## 2024-04-15 RX ORDER — HEPARIN 100 UNIT/ML
500 SYRINGE INTRAVENOUS AS NEEDED
Status: DISCONTINUED | OUTPATIENT
Start: 2024-04-15 | End: 2024-04-15 | Stop reason: HOSPADM

## 2024-04-15 RX ORDER — DIPHENHYDRAMINE HYDROCHLORIDE 50 MG/ML
50 INJECTION INTRAMUSCULAR; INTRAVENOUS AS NEEDED
Status: DISCONTINUED | OUTPATIENT
Start: 2024-04-15 | End: 2024-04-15 | Stop reason: HOSPADM

## 2024-04-15 RX ORDER — ALBUTEROL SULFATE 0.83 MG/ML
3 SOLUTION RESPIRATORY (INHALATION) AS NEEDED
Status: DISCONTINUED | OUTPATIENT
Start: 2024-04-15 | End: 2024-04-15 | Stop reason: HOSPADM

## 2024-04-15 RX ORDER — HEPARIN SODIUM,PORCINE/PF 10 UNIT/ML
50 SYRINGE (ML) INTRAVENOUS AS NEEDED
Status: DISCONTINUED | OUTPATIENT
Start: 2024-04-15 | End: 2024-04-15 | Stop reason: HOSPADM

## 2024-04-15 RX ORDER — FAMOTIDINE 10 MG/ML
20 INJECTION INTRAVENOUS ONCE AS NEEDED
Status: DISCONTINUED | OUTPATIENT
Start: 2024-04-15 | End: 2024-04-15 | Stop reason: HOSPADM

## 2024-04-15 RX ORDER — FAMOTIDINE 10 MG/ML
20 INJECTION INTRAVENOUS ONCE
Status: COMPLETED | OUTPATIENT
Start: 2024-04-15 | End: 2024-04-15

## 2024-04-15 RX ADMIN — SODIUM CHLORIDE 200 MG: 9 INJECTION, SOLUTION INTRAVENOUS at 10:39

## 2024-04-15 RX ADMIN — HEPARIN 500 UNITS: 100 SYRINGE at 15:12

## 2024-04-15 RX ADMIN — PALONOSETRON 250 MCG: 0.05 INJECTION, SOLUTION INTRAVENOUS at 09:23

## 2024-04-15 RX ADMIN — CARBOPLATIN 300 MG: 10 INJECTION, SOLUTION INTRAVENOUS at 14:32

## 2024-04-15 RX ADMIN — Medication 20 MG: at 09:15

## 2024-04-15 RX ADMIN — SODIUM CHLORIDE 150 MG: 9 INJECTION, SOLUTION INTRAVENOUS at 09:29

## 2024-04-15 RX ADMIN — PACLITAXEL 366 MG: 6 INJECTION, SOLUTION INTRAVENOUS at 11:27

## 2024-04-15 RX ADMIN — FAMOTIDINE 20 MG: 10 INJECTION INTRAVENOUS at 09:24

## 2024-04-15 RX ADMIN — DIPHENHYDRAMINE HYDROCHLORIDE 50 MG: 50 CAPSULE ORAL at 09:23

## 2024-04-15 ASSESSMENT — PAIN SCALES - GENERAL: PAINLEVEL: 2

## 2024-04-15 NOTE — SIGNIFICANT EVENT
04/15/24 0856   Prechemo Checklist   Has the patient been in the hospital, ED, or urgent care since last date of service No   Chemo/Immuno Consent Signed Yes   Protocol/Indications Verified Yes   Confirmed to previous date/time of medication Yes   Compared to previous dose Yes   All medications are dated accurately Yes   Pregnancy Test Negative Not applicable   Parameters Met Yes   BSA/Weight-Height Verified Yes   Dose Calculations Verified (current, total, cumulative) Yes

## 2024-04-17 ENCOUNTER — TELEMEDICINE (OUTPATIENT)
Dept: BEHAVIORAL HEALTH | Facility: CLINIC | Age: 74
End: 2024-04-17
Payer: MEDICARE

## 2024-04-17 ENCOUNTER — APPOINTMENT (OUTPATIENT)
Dept: HEMATOLOGY/ONCOLOGY | Facility: CLINIC | Age: 74
End: 2024-04-17
Payer: MEDICARE

## 2024-04-17 DIAGNOSIS — F41.1 GAD (GENERALIZED ANXIETY DISORDER): ICD-10-CM

## 2024-04-17 DIAGNOSIS — G47.09 OTHER INSOMNIA: ICD-10-CM

## 2024-04-17 DIAGNOSIS — F32.5 DEPRESSION, MAJOR, IN REMISSION (CMS-HCC): ICD-10-CM

## 2024-04-17 PROCEDURE — 99213 OFFICE O/P EST LOW 20 MIN: CPT | Performed by: CLINICAL NURSE SPECIALIST

## 2024-04-17 PROCEDURE — 1159F MED LIST DOCD IN RCRD: CPT | Performed by: CLINICAL NURSE SPECIALIST

## 2024-04-17 RX ORDER — LORAZEPAM 1 MG/1
TABLET ORAL
Qty: 45 TABLET | Refills: 0 | Status: SHIPPED | OUTPATIENT
Start: 2024-04-17 | End: 2024-04-17

## 2024-04-17 RX ORDER — LORAZEPAM 1 MG/1
TABLET ORAL
Qty: 45 TABLET | Refills: 0 | Status: SHIPPED | OUTPATIENT
Start: 2024-04-17 | End: 2024-05-27 | Stop reason: SDUPTHER

## 2024-04-17 NOTE — LETTER
April 17, 2024     Prasanth Gonsales MD  [address] [facility] [address]    Patient: Janee Maddox   YOB: 1950   Date of Visit: 4/17/2024       Dear Dr. Prasanth Gonsales MD:   Kaur, I have reviewed the most recent oncology note , and I am seeing this patient today , for psychiatry . Please advise as to the plan for her psych oncology appointment > I have concerns about the lorazepam , and am going to start to decrease her dose today ,due to potential adverse effects . I would like to see if she will be for sure having an appointment to have psych treatment with oncology. Thanks ,   Sincerely,     Maday Wu, APRN-CNS      CC: Kenia Jackson RN  ______________________________________________________________________________________

## 2024-04-17 NOTE — PROGRESS NOTES
Outpatient Psychiatry      Subjective     Janee Maddox, a 74 y.o. female, presents as an established patient for an appointment with outpatient psychiatry for follow up/medication management . This is a virtual appointment      Diagnosis:   Generalized anxiety disorder  Depression major in remission    Other insomnia    Treatment Plan/Recommendations: can continue and decrease lorazepam 1 mg , 1 tablet each morning and 1/2 tablet each evening  , and can continue bupropion xl 300 mg , each morning , can start mirtazapine 15 mg 1/2 tablet daily each night at bedtime for 3 days then increase to a full tablet daily each night at bedtime .can call  , for treatment concerns or can call  for any treatment concerns.and scheduling concerns , can follow up for medications in 4-6 weeks. can continue self care and wellness efforts and maintain other appointments with other medical providers.   Follow-up plan  was discussed with patient.      Review with patient: Treatment plan reviewed with the patient.  Medication risks/benefit reviewed with the patient    HPI:  Patient has been seeing oncology and is having chemotherapy , for squamous cell carcinoma  Reviewed notes in the [facility] per oncology   Reviewed labs in the [facility]   She has continued to work some, working on taxes   Her family helps organize her medications   Denies having confusion   No issues with substance abuse   Anxiety is manageable   The oncology note indicates she was to start mirtazapine , she has had weight loss , encouraged her to start that at 15 mg , 1/2 tablet daily for 3 days then increase to a full tablet daily       Takes precautions to prevent falls     I have personally reviewed the OARRS report for JANEE MADDOX. I have considered the risks of abuse, dependence, addiction and diversion.   I have the following concerns: no concerns on oarrs. No concerns , however will reduce lorazepam dose , sent message to  oncology through my chart for care coordination , patient has a falls risk , reviewed potential side effects and med instructions reviewed   Is the patient prescribed a combination of a benzodiazepine and opioid? No.   Last urine drug screening date/ordered today: uds ordered 6/12/23 patient says she had this done , need to call lab   Date of the last Controlled Substance Agreement: signed paper copy , csa in person appointment on 10/24/23  BENZODIAZEPINES   What is the patient's goal of therapy? to manage pepe and support daily functioning.  Is this being achieved with current treatment? yes , she can attend to daily activities without interruption of intense anxiety.         Medical History:  Past Medical History:   Diagnosis Date    Agoraphobia, unspecified 12/06/2016    Agoraphobia    Body mass index (BMI) 33.0-33.9, adult     BMI 33.0-33.9,adult    Chronic sinusitis, unspecified     Recurrent sinus infections    Coronary artery disease     Cutaneous abscess, unspecified 06/01/2016    Abscess    Disruption of external operation (surgical) wound, not elsewhere classified, initial encounter 07/31/2017    Open abdominal incision with drainage    Diverticulitis of intestine, part unspecified, without perforation or abscess without bleeding 08/13/2018    Acute diverticulitis    Encounter for immunization 04/21/2021    Encounter for immunization    Encounter for screening for lipoid disorders 04/26/2016    Screening cholesterol level    Hypercholesteremia     Hypertension     Incisional hernia without obstruction or gangrene 09/11/2015    Incisional hernia    Infection following a procedure, other surgical site, initial encounter 04/20/2017    Incisional infection    Local infection of the skin and subcutaneous tissue, unspecified 05/01/2019    Skin infection    Other conditions influencing health status     Complete Colonoscopy    Other specified postprocedural states 05/15/2018    History of incision and drainage     Otitis media, unspecified, left ear 05/19/2014    Otitis media, left    Pain in right knee 02/16/2015    Bilateral knee pain    Personal history of other diseases of the circulatory system     History of hypertension    Personal history of other infectious and parasitic diseases 07/03/2018    History of onychomycosis    Personal history of other infectious and parasitic diseases 10/18/2019    History of Clostridioides difficile colitis    Personal history of other specified conditions 04/20/2017    History of dysuria    Personal history of other specified conditions 02/02/2017    History of nocturia    Personal history of other specified conditions 10/18/2019    History of diarrhea    Personal history of other specified conditions 12/06/2019    History of dysuria    Personal history of other specified conditions 02/16/2015    History of dyspnea    Personal history of urinary (tract) infections 12/11/2019    History of urinary tract infection    Squamous cell carcinoma of lower lobe of right lung (Multi) 01/24/2024    Syncope and collapse 01/05/2018    Collapse    Tinnitus, left ear 08/29/2014    Tinnitus of left ear    Unspecified open wound of abdominal wall, unspecified quadrant without penetration into peritoneal cavity, initial encounter 08/23/2019    Wound, open, abdominal wall, anterior     Surgical History:  Past Surgical History:   Procedure Laterality Date    CARPAL TUNNEL RELEASE  05/20/2014    Neuroplasty Decompression Median Nerve At Carpal Tunnel    CHOLECYSTECTOMY  05/20/2014    Cholecystectomy    COLONOSCOPY  01/08/2021    Complete Colonoscopy    CT GUIDED ASPIRATION OF ABSCESS, HEMATOMA, CYST  11/27/2023    CT GUIDED ASPIRATION OF ABSCESS, HEMATOMA, CYST 11/27/2023 PAR CT    CT GUIDED PERCUTANEOUS BIOPSY LUNG  11/27/2023    CT GUIDED PERCUTANEOUS BIOPSY LUNG 11/27/2023 PAR CT    HYSTERECTOMY  05/20/2014    Hysterectomy    KNEE ARTHROSCOPY W/ DEBRIDEMENT  02/16/2015    Knee Arthroscopy (Therapeutic)     OTHER SURGICAL HISTORY  08/20/2019    Incisional Hernia Repair    RECTAL VAGINAL FISTULECTOMY  05/20/2014    Rectocele Repair     Record Review: extensive     Vitals:  There were no vitals filed for this visit.    Maday Wu, APRN-CNS

## 2024-04-18 ENCOUNTER — APPOINTMENT (OUTPATIENT)
Dept: HEMATOLOGY/ONCOLOGY | Facility: CLINIC | Age: 74
End: 2024-04-18
Payer: MEDICARE

## 2024-04-19 ENCOUNTER — LAB (OUTPATIENT)
Dept: LAB | Facility: CLINIC | Age: 74
End: 2024-04-19
Payer: MEDICARE

## 2024-04-19 ENCOUNTER — INFUSION (OUTPATIENT)
Dept: HEMATOLOGY/ONCOLOGY | Facility: CLINIC | Age: 74
End: 2024-04-19
Payer: MEDICARE

## 2024-04-19 ENCOUNTER — APPOINTMENT (OUTPATIENT)
Dept: HEMATOLOGY/ONCOLOGY | Facility: CLINIC | Age: 74
End: 2024-04-19
Payer: MEDICARE

## 2024-04-19 ENCOUNTER — TELEPHONE (OUTPATIENT)
Dept: UROLOGY | Facility: CLINIC | Age: 74
End: 2024-04-19

## 2024-04-19 VITALS
RESPIRATION RATE: 18 BRPM | BODY MASS INDEX: 30.99 KG/M2 | HEART RATE: 89 BPM | SYSTOLIC BLOOD PRESSURE: 140 MMHG | OXYGEN SATURATION: 96 % | HEIGHT: 60 IN | WEIGHT: 157.85 LBS | TEMPERATURE: 97.2 F | DIASTOLIC BLOOD PRESSURE: 72 MMHG

## 2024-04-19 DIAGNOSIS — N30.00 ACUTE CYSTITIS WITHOUT HEMATURIA: ICD-10-CM

## 2024-04-19 DIAGNOSIS — C79.31 MALIGNANT NEOPLASM METASTATIC TO BRAIN (MULTI): ICD-10-CM

## 2024-04-19 DIAGNOSIS — C34.31 SQUAMOUS CELL CARCINOMA OF LOWER LOBE OF RIGHT LUNG (MULTI): ICD-10-CM

## 2024-04-19 DIAGNOSIS — R30.0 DYSURIA: Primary | ICD-10-CM

## 2024-04-19 DIAGNOSIS — R30.0 DYSURIA: ICD-10-CM

## 2024-04-19 LAB
BACTERIA #/AREA URNS AUTO: ABNORMAL /HPF
RBC #/AREA URNS AUTO: >20 /HPF
SQUAMOUS #/AREA URNS AUTO: ABNORMAL /HPF
WBC #/AREA URNS AUTO: >50 /HPF
WBC CLUMPS #/AREA URNS AUTO: ABNORMAL /HPF

## 2024-04-19 PROCEDURE — 2500000004 HC RX 250 GENERAL PHARMACY W/ HCPCS (ALT 636 FOR OP/ED): Performed by: INTERNAL MEDICINE

## 2024-04-19 PROCEDURE — 81001 URINALYSIS AUTO W/SCOPE: CPT

## 2024-04-19 PROCEDURE — 96360 HYDRATION IV INFUSION INIT: CPT | Mod: INF

## 2024-04-19 PROCEDURE — 87086 URINE CULTURE/COLONY COUNT: CPT | Mod: PARLAB

## 2024-04-19 RX ORDER — SODIUM CHLORIDE 9 MG/ML
500 INJECTION, SOLUTION INTRAVENOUS CONTINUOUS
Status: CANCELLED | OUTPATIENT
Start: 2024-04-19

## 2024-04-19 RX ORDER — GRANULES FOR ORAL 3 G/1
POWDER ORAL
Qty: 9 G | Refills: 0 | Status: SHIPPED | OUTPATIENT
Start: 2024-04-19 | End: 2024-04-19 | Stop reason: SDUPTHER

## 2024-04-19 RX ORDER — HEPARIN 100 UNIT/ML
500 SYRINGE INTRAVENOUS AS NEEDED
Status: DISCONTINUED | OUTPATIENT
Start: 2024-04-19 | End: 2024-04-19 | Stop reason: HOSPADM

## 2024-04-19 RX ORDER — FAMOTIDINE 10 MG/ML
20 INJECTION INTRAVENOUS ONCE AS NEEDED
Status: DISCONTINUED | OUTPATIENT
Start: 2024-04-19 | End: 2024-04-19 | Stop reason: HOSPADM

## 2024-04-19 RX ORDER — ALBUTEROL SULFATE 0.83 MG/ML
3 SOLUTION RESPIRATORY (INHALATION) AS NEEDED
Status: CANCELLED | OUTPATIENT
Start: 2024-04-19

## 2024-04-19 RX ORDER — HEPARIN SODIUM,PORCINE/PF 10 UNIT/ML
50 SYRINGE (ML) INTRAVENOUS AS NEEDED
Status: CANCELLED | OUTPATIENT
Start: 2024-04-19

## 2024-04-19 RX ORDER — FAMOTIDINE 10 MG/ML
20 INJECTION INTRAVENOUS ONCE AS NEEDED
Status: CANCELLED | OUTPATIENT
Start: 2024-04-19

## 2024-04-19 RX ORDER — DIPHENHYDRAMINE HYDROCHLORIDE 50 MG/ML
50 INJECTION INTRAMUSCULAR; INTRAVENOUS AS NEEDED
Status: CANCELLED | OUTPATIENT
Start: 2024-04-19

## 2024-04-19 RX ORDER — HEPARIN SODIUM,PORCINE/PF 10 UNIT/ML
50 SYRINGE (ML) INTRAVENOUS AS NEEDED
Status: DISCONTINUED | OUTPATIENT
Start: 2024-04-19 | End: 2024-04-19 | Stop reason: HOSPADM

## 2024-04-19 RX ORDER — ALBUTEROL SULFATE 0.83 MG/ML
3 SOLUTION RESPIRATORY (INHALATION) AS NEEDED
Status: DISCONTINUED | OUTPATIENT
Start: 2024-04-19 | End: 2024-04-19 | Stop reason: HOSPADM

## 2024-04-19 RX ORDER — EPINEPHRINE 0.3 MG/.3ML
0.3 INJECTION SUBCUTANEOUS EVERY 5 MIN PRN
Status: DISCONTINUED | OUTPATIENT
Start: 2024-04-19 | End: 2024-04-19 | Stop reason: HOSPADM

## 2024-04-19 RX ORDER — HEPARIN 100 UNIT/ML
500 SYRINGE INTRAVENOUS AS NEEDED
Status: CANCELLED | OUTPATIENT
Start: 2024-04-19

## 2024-04-19 RX ORDER — EPINEPHRINE 0.3 MG/.3ML
0.3 INJECTION SUBCUTANEOUS EVERY 5 MIN PRN
Status: CANCELLED | OUTPATIENT
Start: 2024-04-19

## 2024-04-19 RX ORDER — SODIUM CHLORIDE 9 MG/ML
500 INJECTION, SOLUTION INTRAVENOUS CONTINUOUS
Status: DISCONTINUED | OUTPATIENT
Start: 2024-04-19 | End: 2024-04-19 | Stop reason: HOSPADM

## 2024-04-19 RX ORDER — DIPHENHYDRAMINE HYDROCHLORIDE 50 MG/ML
50 INJECTION INTRAMUSCULAR; INTRAVENOUS AS NEEDED
Status: DISCONTINUED | OUTPATIENT
Start: 2024-04-19 | End: 2024-04-19 | Stop reason: HOSPADM

## 2024-04-19 RX ORDER — GRANULES FOR ORAL 3 G/1
POWDER ORAL
Qty: 9 G | Refills: 0 | Status: SHIPPED | OUTPATIENT
Start: 2024-04-19

## 2024-04-19 RX ADMIN — HEPARIN 500 UNITS: 100 SYRINGE at 12:40

## 2024-04-19 RX ADMIN — SODIUM CHLORIDE 500 ML/HR: 9 INJECTION, SOLUTION INTRAVENOUS at 11:28

## 2024-04-19 ASSESSMENT — PAIN SCALES - GENERAL: PAINLEVEL: 4

## 2024-04-19 NOTE — TELEPHONE ENCOUNTER
Pt left message stating she is still symptomatic of a UTI and would like more fosfomycin sent to her local pharmacy. Orders placed in chart so she can drop off a urine prior to taking the fosfomycin. Please advise, thanks.

## 2024-04-19 NOTE — TELEPHONE ENCOUNTER
Per pt. please cancel rx for [facility] and send to the [facility] in her chart as it is a lot less expensive there, thanks.

## 2024-04-21 LAB — BACTERIA UR CULT: ABNORMAL

## 2024-04-22 ENCOUNTER — APPOINTMENT (OUTPATIENT)
Dept: HEMATOLOGY/ONCOLOGY | Facility: CLINIC | Age: 74
End: 2024-04-22
Payer: MEDICARE

## 2024-04-24 ENCOUNTER — APPOINTMENT (OUTPATIENT)
Dept: PALLIATIVE MEDICINE | Facility: CLINIC | Age: 74
End: 2024-04-24
Payer: MEDICARE

## 2024-04-24 NOTE — CARE PLAN
The patient's goals for the shift include      The clinical goals for the shift include pain management      Problem: Pain - Adult  Goal: Verbalizes/displays adequate comfort level or baseline comfort level  Outcome: Progressing     Problem: Safety - Adult  Goal: Free from fall injury  Outcome: Progressing     Problem: Discharge Planning  Goal: Discharge to home or other facility with appropriate resources  Outcome: Progressing     Problem: Chronic Conditions and Co-morbidities  Goal: Patient's chronic conditions and co-morbidity symptoms are monitored and maintained or improved  Outcome: Progressing        Initiate Treatment: [place] 4 % topical cream QHS\\nQuantity: 40.0 g  Days Supply: 30\\nSig: Apply a small amount once daily at night to scaly areas on the scalp as indicated for 2 weeks. Wash hands thoroughly after use. Detail Level: Simple Render In Strict Bullet Format?: No

## 2024-05-02 ENCOUNTER — TELEMEDICINE (OUTPATIENT)
Dept: PALLIATIVE MEDICINE | Facility: HOSPITAL | Age: 74
End: 2024-05-02
Payer: MEDICARE

## 2024-05-02 DIAGNOSIS — R63.0 DECREASED APPETITE: Primary | ICD-10-CM

## 2024-05-02 PROCEDURE — 99212 OFFICE O/P EST SF 10 MIN: CPT

## 2024-05-02 PROCEDURE — 1159F MED LIST DOCD IN RCRD: CPT

## 2024-05-02 NOTE — PROGRESS NOTES
SUPPORTIVE AND PALLIATIVE ONCOLOGY FOLLOW-UP - OUTPATIENT      SERVICE DATE: 5/2/2024    Virtual or Telephone Consent    An interactive audio and video telecommunication system which permits real time communications between the patient (at the originating site) and provider (at the distant site) was utilized to provide this telehealth service.   Verbal consent was requested and obtained from Janee Maddox on this date, 05/02/24 for a telehealth visit.       Subjective   HISTORY OF PRESENT ILLNESS: Janee Maddox is a 74 y.o. 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 Started treatment with Pembrolizumab, Paclitaxel, and Carboplatin 2/19.     Second cycle delayed due to hospital admission and dehydration.     Pain Assessment:  Denies pain today. Has not been taking any pain medication recently.     Symptom Assessment:  Pain:none  Headache: none  Dizziness:none  Lack of energy: a little  Difficulty sleeping: a little up every 2-3 hours going to the bathroom   Worrying: a little  Anxiety: a little  Depression: a little. Good and bad days. States this is stable  Pain in mouth/swallowing: none  Dry mouth: none  Taste changes: very much  Shortness of breath: none  Lack of appetite: somewhat. Due to taste changes. Is having trouble finding foods that taste good. Drinking plenty of fluids  Nausea: none  Vomiting: a little 2 days this week with vomiting. She thinks this was related to greasy foods  Constipation: none  Diarrhea: a little. 2 days this week with diarrhea. Using imodium and peptobismol with relief  Sore muscles: none  Numbness or tingling in hands/feet/other: none  Weight loss: none  Other:  UTI symptoms have improved.       Information obtained from: chart review, interview of patient, and interview of family  ______________________________________________________________________     Oncology History   Squamous cell  carcinoma of lower lobe of right lung (Multi)   1/24/2024 Initial Diagnosis    Squamous cell carcinoma of lower lobe of right lung (CMS/HCC)     2/19/2024 -  Chemotherapy    Pembrolizumab + PACLitaxel / CARBOplatin, 21 Day Cycles         Past Medical History:   Diagnosis Date    Agoraphobia, unspecified 12/06/2016    Agoraphobia    Body mass index (BMI) 33.0-33.9, adult     BMI 33.0-33.9,adult    Chronic sinusitis, unspecified     Recurrent sinus infections    Coronary artery disease     Cutaneous abscess, unspecified 06/01/2016    Abscess    Disruption of external operation (surgical) wound, not elsewhere classified, initial encounter 07/31/2017    Open abdominal incision with drainage    Diverticulitis of intestine, part unspecified, without perforation or abscess without bleeding 08/13/2018    Acute diverticulitis    Encounter for immunization 04/21/2021    Encounter for immunization    Encounter for screening for lipoid disorders 04/26/2016    Screening cholesterol level    Hypercholesteremia     Hypertension     Incisional hernia without obstruction or gangrene 09/11/2015    Incisional hernia    Infection following a procedure, other surgical site, initial encounter 04/20/2017    Incisional infection    Local infection of the skin and subcutaneous tissue, unspecified 05/01/2019    Skin infection    Other conditions influencing health status     Complete Colonoscopy    Other specified postprocedural states 05/15/2018    History of incision and drainage    Otitis media, unspecified, left ear 05/19/2014    Otitis media, left    Pain in right knee 02/16/2015    Bilateral knee pain    Personal history of other diseases of the circulatory system     History of hypertension    Personal history of other infectious and parasitic diseases 07/03/2018    History of onychomycosis    Personal history of other infectious and parasitic diseases 10/18/2019    History of Clostridioides difficile colitis    Personal history of  other specified conditions 04/20/2017    History of dysuria    Personal history of other specified conditions 02/02/2017    History of nocturia    Personal history of other specified conditions 10/18/2019    History of diarrhea    Personal history of other specified conditions 12/06/2019    History of dysuria    Personal history of other specified conditions 02/16/2015    History of dyspnea    Personal history of urinary (tract) infections 12/11/2019    History of urinary tract infection    Squamous cell carcinoma of lower lobe of right lung (Multi) 01/24/2024    Syncope and collapse 01/05/2018    Collapse    Tinnitus, left ear 08/29/2014    Tinnitus of left ear    Unspecified open wound of abdominal wall, unspecified quadrant without penetration into peritoneal cavity, initial encounter 08/23/2019    Wound, open, abdominal wall, anterior     Past Surgical History:   Procedure Laterality Date    CARPAL TUNNEL RELEASE  05/20/2014    Neuroplasty Decompression Median Nerve At Carpal Tunnel    CHOLECYSTECTOMY  05/20/2014    Cholecystectomy    COLONOSCOPY  01/08/2021    Complete Colonoscopy    CT GUIDED ASPIRATION OF ABSCESS, HEMATOMA, CYST  11/27/2023    CT GUIDED ASPIRATION OF ABSCESS, HEMATOMA, CYST 11/27/2023 PAR CT    CT GUIDED PERCUTANEOUS BIOPSY LUNG  11/27/2023    CT GUIDED PERCUTANEOUS BIOPSY LUNG 11/27/2023 PAR CT    HYSTERECTOMY  05/20/2014    Hysterectomy    KNEE ARTHROSCOPY W/ DEBRIDEMENT  02/16/2015    Knee Arthroscopy (Therapeutic)    OTHER SURGICAL HISTORY  08/20/2019    Incisional Hernia Repair    RECTAL VAGINAL FISTULECTOMY  05/20/2014    Rectocele Repair     Family History   Problem Relation Name Age of Onset    Hypertension Mother      Other (cardiac disorder) Father      Heart attack Maternal Grandfather          SOCIAL HISTORY  Children 3, Grandchildren 7 and 1 great grandkid, Support system family and friends, 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 Her smoking use included cigarettes. She started smoking about 62 years ago. She has a 62 pack-year smoking history. She does not have any smokeless tobacco history on file. She reports that she does not currently use alcohol. She reports that she does not use drugs. Smoked 1 PPD until November 2023.     REVIEW OF SYSTEMS  Review of systems negative unless noted in HPI.       Objective     Current Outpatient Medications   Medication Instructions    albuterol (Ventolin HFA) 90 mcg/actuation inhaler 2 puffs, inhalation, As needed    ALBUTEROL INHL 2 puffs, Inhalation, [id number], PRN PRN for wheezing, # 18 g, Refill(s) 2, Date: 10/12/2023 14:11:00 EDT, Pharmacy: [facility] #[id number], 2 puffs Inhalation [id number],PRN:for wheezing, 154.94, 09/26/2023 19:59:00 EDT, Height/Length Dosing, cm, 86.8, 07/30/2023...    estradiol (Estrace) 0.01 % (0.1 mg/gram) vaginal cream Apply daily 1 gram (large pea) for 3 weeks, then 3 times per week.    fosfomycin (Monurol) 3 gram packet 3 gram every 72 hrs up to 3 doses total    Lactobac. rhamnosus GG-inulin 20 billion cell -200 mg capsule oral    LORazepam (Ativan) 1 mg tablet 1 tablet each morning and 1/2 tablet each evening This is a reduced dose    mirtazapine (REMERON) 15 mg, oral, Nightly, Take half tablet every night for 7 days and then 1 tablet every night    omeprazole (PRILOSEC) 20 mg, oral, Daily before breakfast    ondansetron (ZOFRAN) 8 mg, oral, Every 8 hours PRN    peppermint oiL liquid Topical    prochlorperazine (COMPAZINE) 10 mg, oral, Every 6 hours PRN    psyllium seed, with sugar, (METAMUCIL, SUGAR, ORAL) ORAL, Refill(s) 0, Date: 07/08/2021 15:08:00 EDT    solifenacin (VESICARE) 5 mg, oral, Daily, Swallow tablet whole; do not crush, chew, or split.       Allergies:   Allergies   Allergen Reactions    Nitrofurantoin Monohyd/M-Cryst Anaphylaxis    Codeine Unknown     Nitrofurantoin Itching    Penicillin Hives and Other     pt tolerates cephalosporins    Penicillin G Unknown     Tolerated cefepime during 10/2019 admission   Tolerated ceftriaxone 7/2018 admission    Penicillins Rash    Tetracyclines Rash       No visits with results within 1 Week(s) from this visit.   Latest known visit with results is:   Lab on 04/19/2024   Component Date Value Ref Range Status    Urine Culture 04/19/2024 >100,000 Escherichia coli (A)   Final    Extended Spectrum Beta Lactamase (ESBL) producing organism    WBC, Urine 04/19/2024 >50 (A)  1-5, NONE /HPF Final    WBC Clumps, Urine 04/19/2024 MANY  Reference range not established. /HPF Final    RBC, Urine 04/19/2024 >20 (A)  NONE, 1-2, 3-5 /HPF Final    Squamous Epithelial Cells, Urine 04/19/2024 26-50 (1+)  Reference range not established. /HPF Final    Bacteria, Urine 04/19/2024 3+ (A)  NONE SEEN /HPF Final       PHYSICAL EXAMINATION  Vital Signs: not completed due to virtual visit       Physical Exam not completed due to virtual visit    ASSESSMENT/PLAN    Pain  Pain is: cancer related pain  Type: somatic  Pain control: well-controlled  Home regimen:   - Continue Norco 5-325 mg every 4-6 hours as needed for pain - this has been effective  - Continue Acetaminophen 500-1000 mg every 8 hours - watch with acetaminophen included in norco   - Has Tramadol 50 mg tabs at home - discussed not using concurrently with Norco  - Was prescribed Percocet 5-325 mg by the ER but has not used  - Allergy to codeine but has tolerated medications listed above    Opioid Use  Medication Management:   - OARRS report reviewed with no aberrant behavior; consistent with  prescriptions/records and patient history  - MED 00.  Overdose Risk Score 120.   This has been discussed with patient.   - We will continue to closely monitor the patient for signs of prescription misuse including UDS, OARRS review and subjective reports at each visit.  - concurrent benzodiazepine use  with Lorazepam   - I am a provider who either is or has consulted and collaborated with a provider certified in Hospice and Palliative Medicine and have conducted a face-face visit and examination for this patient.  - Routine Urine Drug Screen completed 2/21/24 negative for illicit substances  - Controlled Substance Agreement completed 2/21/24  - Specifically discussed that controlled substance prescriptions will only be provided by our group as outlined in the completed agreement  - Prescribed naloxone discuss sending next visit  - Red Flags: none     Nausea   At risk for nausea without vomiting related to chemotherapy   - Continue Ondansetron 8 mg every 8 hours as needed  - Continue prochlorperazine 10 mg every 6 hours as needed    Bowels   At risk for constipation related to opioids,  currently not constipated   Usual bowel pattern: varies multiple times per day or every 3-4 days   Current regimen: n/a  - Continue Imodium 2 mg with each episode of diarrhea - do not exceed 16 mg/day  - Continue Pepto-bismol as needed   LBM 2/21/24     Altered Mood  Chronic anxiety and depression  controlled with home regimen  Current regimen:   - Continue Wellbutrin 300 mg daily  - Continue Lorazepam 1 mg BID  - Follows with a psychiatrist     Sleeping Difficulty:  Impaired sleep related to pain  Current regimen:    - Continue pain medications as indicated above    Decreased appetite  Related to malignancy  Nutrition consult  Current regimen:    - Encouraged smaller, more frequent meals  - Continue to drink boost daily- increase as tolerated  - Continue Mirtazapine 15 mg daily - if she feels this is causing diarrhea, will decrease to 7.5 mg     Advance Directives  Existence of Advance Directives:Unknown- patient going to look for these documents at home and bring them in  Decision maker: HCPOA is Ronna Simpson  Code Status: DNR    Next Follow-Up Visit:  Return to clinic in 3 weeks    Signature and billing  Thank you for allowing us  to participate in the care of this patient. Recommendations will be communicated back to the consulting service by way of shared electronic medical record or face-to-face.    Medical complexity was high level due to due to complexity of problems, extensive data review, and high risk of management/treatment.  Time was spent on the following: Prep Time, Time Directly with Patient/Family/Caregiver, Documentation Time. Total time spent: 15      DATA   Diagnostic tests and information reviewed for today's visit:  Most recent labs, Most recent imaging, Medications       Plan of Care discussed with: Patient and Family/Significant Other: daughter      SIGNATURE: HUANG Govea    Contact information:  [facility] Pall[facility]  Monday-Friday 8 AM-5 PM  Phone:  [phone], press option #5, then option #1.   Or Epic Secure Chat

## 2024-05-03 ENCOUNTER — INFUSION (OUTPATIENT)
Dept: HEMATOLOGY/ONCOLOGY | Facility: CLINIC | Age: 74
End: 2024-05-03
Payer: MEDICARE

## 2024-05-03 ENCOUNTER — OFFICE VISIT (OUTPATIENT)
Dept: HEMATOLOGY/ONCOLOGY | Facility: CLINIC | Age: 74
End: 2024-05-03
Payer: MEDICARE

## 2024-05-03 VITALS
RESPIRATION RATE: 22 BRPM | HEART RATE: 90 BPM | DIASTOLIC BLOOD PRESSURE: 70 MMHG | OXYGEN SATURATION: 98 % | HEIGHT: 60 IN | WEIGHT: 158.73 LBS | SYSTOLIC BLOOD PRESSURE: 118 MMHG | TEMPERATURE: 96.8 F | BODY MASS INDEX: 31.16 KG/M2

## 2024-05-03 DIAGNOSIS — C34.31 SQUAMOUS CELL CARCINOMA OF LOWER LOBE OF RIGHT LUNG (MULTI): Primary | ICD-10-CM

## 2024-05-03 DIAGNOSIS — C34.11 SQUAMOUS CELL CARCINOMA OF UPPER LOBE OF RIGHT LUNG (MULTI): ICD-10-CM

## 2024-05-03 DIAGNOSIS — C79.31 MALIGNANT NEOPLASM METASTATIC TO BRAIN (MULTI): ICD-10-CM

## 2024-05-03 DIAGNOSIS — C34.31 SQUAMOUS CELL CARCINOMA OF LOWER LOBE OF RIGHT LUNG (MULTI): ICD-10-CM

## 2024-05-03 PROBLEM — D80.3 IGG DEFICIENCY (MULTI): Status: ACTIVE | Noted: 2024-05-03

## 2024-05-03 LAB
ALBUMIN SERPL BCP-MCNC: 3.7 G/DL (ref 3.4–5)
ALP SERPL-CCNC: 57 U/L (ref 33–136)
ALT SERPL W P-5'-P-CCNC: 12 U/L (ref 7–45)
ANION GAP SERPL CALC-SCNC: 13 MMOL/L (ref 10–20)
AST SERPL W P-5'-P-CCNC: 15 U/L (ref 9–39)
BASO STIPL BLD QL SMEAR: NORMAL
BASOPHILS # BLD AUTO: 0.04 X10*3/UL (ref 0–0.1)
BASOPHILS NFR BLD AUTO: 1 %
BILIRUB SERPL-MCNC: 0.3 MG/DL (ref 0–1.2)
BUN SERPL-MCNC: 20 MG/DL (ref 6–23)
CALCIUM SERPL-MCNC: 9.1 MG/DL (ref 8.6–10.3)
CHLORIDE SERPL-SCNC: 106 MMOL/L (ref 98–107)
CO2 SERPL-SCNC: 25 MMOL/L (ref 21–32)
CREAT SERPL-MCNC: 1.17 MG/DL (ref 0.5–1.05)
DACRYOCYTES BLD QL SMEAR: NORMAL
EGFRCR SERPLBLD CKD-EPI 2021: 48 ML/MIN/1.73M*2
EOSINOPHIL # BLD AUTO: 0.18 X10*3/UL (ref 0–0.4)
EOSINOPHIL NFR BLD AUTO: 4.5 %
ERYTHROCYTE [DISTWIDTH] IN BLOOD BY AUTOMATED COUNT: 21.2 % (ref 11.5–14.5)
GLUCOSE SERPL-MCNC: 138 MG/DL (ref 74–99)
HCT VFR BLD AUTO: 26.8 % (ref 36–46)
HGB BLD-MCNC: 8.5 G/DL (ref 12–16)
IMM GRANULOCYTES # BLD AUTO: 0.06 X10*3/UL (ref 0–0.5)
IMM GRANULOCYTES NFR BLD AUTO: 1.5 % (ref 0–0.9)
LYMPHOCYTES # BLD AUTO: 1.1 X10*3/UL (ref 0.8–3)
LYMPHOCYTES NFR BLD AUTO: 27.4 %
MCH RBC QN AUTO: 29.8 PG (ref 26–34)
MCHC RBC AUTO-ENTMCNC: 31.7 G/DL (ref 32–36)
MCV RBC AUTO: 94 FL (ref 80–100)
MONOCYTES # BLD AUTO: 0.33 X10*3/UL (ref 0.05–0.8)
MONOCYTES NFR BLD AUTO: 8.2 %
NEUTROPHILS # BLD AUTO: 2.3 X10*3/UL (ref 1.6–5.5)
NEUTROPHILS NFR BLD AUTO: 57.4 %
NRBC BLD-RTO: 0 /100 WBCS (ref 0–0)
OVALOCYTES BLD QL SMEAR: NORMAL
PLATELET # BLD AUTO: 131 X10*3/UL (ref 150–450)
POLYCHROMASIA BLD QL SMEAR: NORMAL
POTASSIUM SERPL-SCNC: 4.2 MMOL/L (ref 3.5–5.3)
PROT SERPL-MCNC: 6.5 G/DL (ref 6.4–8.2)
RBC # BLD AUTO: 2.85 X10*6/UL (ref 4–5.2)
RBC MORPH BLD: NORMAL
SODIUM SERPL-SCNC: 140 MMOL/L (ref 136–145)
TSH SERPL-ACNC: 0.8 MIU/L (ref 0.44–3.98)
WBC # BLD AUTO: 4 X10*3/UL (ref 4.4–11.3)

## 2024-05-03 PROCEDURE — 80053 COMPREHEN METABOLIC PANEL: CPT

## 2024-05-03 PROCEDURE — 1159F MED LIST DOCD IN RCRD: CPT | Performed by: INTERNAL MEDICINE

## 2024-05-03 PROCEDURE — 99215 OFFICE O/P EST HI 40 MIN: CPT | Performed by: INTERNAL MEDICINE

## 2024-05-03 PROCEDURE — 2500000004 HC RX 250 GENERAL PHARMACY W/ HCPCS (ALT 636 FOR OP/ED): Performed by: INTERNAL MEDICINE

## 2024-05-03 PROCEDURE — 36591 DRAW BLOOD OFF VENOUS DEVICE: CPT

## 2024-05-03 PROCEDURE — 96523 IRRIG DRUG DELIVERY DEVICE: CPT

## 2024-05-03 PROCEDURE — 84443 ASSAY THYROID STIM HORMONE: CPT

## 2024-05-03 PROCEDURE — 85025 COMPLETE CBC W/AUTO DIFF WBC: CPT

## 2024-05-03 PROCEDURE — 1126F AMNT PAIN NOTED NONE PRSNT: CPT | Performed by: INTERNAL MEDICINE

## 2024-05-03 RX ORDER — DIPHENHYDRAMINE HCL 25 MG
25 TABLET ORAL ONCE
Status: CANCELLED | OUTPATIENT
Start: 2024-05-09

## 2024-05-03 RX ORDER — ALBUTEROL SULFATE 0.83 MG/ML
3 SOLUTION RESPIRATORY (INHALATION) AS NEEDED
Status: CANCELLED | OUTPATIENT
Start: 2024-05-09

## 2024-05-03 RX ORDER — HEPARIN 100 UNIT/ML
500 SYRINGE INTRAVENOUS AS NEEDED
Status: DISCONTINUED | OUTPATIENT
Start: 2024-05-03 | End: 2024-05-03 | Stop reason: HOSPADM

## 2024-05-03 RX ORDER — EPINEPHRINE 0.3 MG/.3ML
0.3 INJECTION SUBCUTANEOUS EVERY 5 MIN PRN
Status: CANCELLED | OUTPATIENT
Start: 2024-05-09

## 2024-05-03 RX ORDER — HEPARIN SODIUM,PORCINE/PF 10 UNIT/ML
50 SYRINGE (ML) INTRAVENOUS AS NEEDED
Status: CANCELLED | OUTPATIENT
Start: 2024-05-03

## 2024-05-03 RX ORDER — FAMOTIDINE 10 MG/ML
20 INJECTION INTRAVENOUS ONCE AS NEEDED
Status: CANCELLED | OUTPATIENT
Start: 2024-05-09

## 2024-05-03 RX ORDER — DIPHENHYDRAMINE HYDROCHLORIDE 50 MG/ML
50 INJECTION INTRAMUSCULAR; INTRAVENOUS AS NEEDED
Status: CANCELLED | OUTPATIENT
Start: 2024-05-09

## 2024-05-03 RX ORDER — HEPARIN 100 UNIT/ML
500 SYRINGE INTRAVENOUS AS NEEDED
Status: CANCELLED | OUTPATIENT
Start: 2024-05-03

## 2024-05-03 RX ORDER — ACETAMINOPHEN 325 MG/1
650 TABLET ORAL ONCE
Status: CANCELLED | OUTPATIENT
Start: 2024-05-09

## 2024-05-03 RX ADMIN — HEPARIN 500 UNITS: 100 SYRINGE at 10:57

## 2024-05-03 ASSESSMENT — PAIN SCALES - GENERAL: PAINLEVEL: 0-NO PAIN

## 2024-05-03 NOTE — PROGRESS NOTES
Bedside and Verbal shift change report given to Sharlene (oncoming nurse) by Chong Bustamante RN (offgoing nurse). Report included the following information Kardex, MAR and Recent Results. 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  TP53 mutated.        a. Ist line therapy with carboplatin/Taxol/Keytruda beginning Feb 2024.        b. S/p stereotactic radiation to the brain lesion in March 2024.    CHIEF COMPLAINTS:  Patient is in the clinic today for evaluation of right lung squamous cell carcinoma and for continuation of the chemotherapy and management of therapy-related effects. .    HISTORY:  Janee Maddox is a 74 y.o. female with right lung squamous cell carcinoma.  She is a lifelong smoker and was admitted at [facility] in Nov 2023 with complaining of shortness of breath, chest discomfort and positive bacteremia.  During the evaluation she was noted to have a large cavitary lesion in the right lung along with pleural effusion.  CT scan at that time showed a 6.1 x 5 cm right lower lobe cavitary lung mass along with loculated right-sided pleural effusion and prominent mediastinal and hilar lymphadenopathy.  Overall findings are concerning for either abscess or baseline malignancy.  She was transferred to [facility] where a CT-guided biopsy confirmed Invasive squamous cell carcinoma.  PD-L1 expression was 5%.  Tumor NGS panel was mainly suggestive of TP53 mutation.  During hospitalization her clinical course was also complicated by Pantoea bacteremia and an ESBL Ecoli UTI.  She was treated with antibiotics and other supportive care.  A follow-up PET scan from Jan 2024 confirmed increased metabolic activity in the right lower lobe pleural-based cavitary mass along with mediastinal and right hilar lymphadenopathy and right-sided pleural disease consistent with known malignancy.  There was no evidence of distant metastatic disease.  Brain MRI showed small solitary left parietal  "metastatic lesion and she is s/p stereotactic radiation treatment in 2024.  She is currently being treated with first-line therapy with carbo/Taxol/Keytruda.     INTERVAL HISTORY:  So far she has received 3 cycles of chemotherapy.  She again had a prolonged hospitalization at [facility] with E. coli UTI.  She was treated with meropenem another antibiotics.  Today she is feeling slightly better but still has issues with persistent weakness and fatigue.     PAST MEDICAL HISTORY:  1.  Right lower lobe squamous cell lung cancer as detailed above.  2.  Coronary artery disease  3.  Hypertension  4.  Hyperlipidemia  5.  GERD  6.  Anxiety/depression  7.  History of C. difficile colitis  8.  E. coli UTI.  9.  History of complete hysterectomy, cholecystectomy and incisional hernia surgeries    SOCIAL HISTORY:  She lives alone in [place].  Quit smoking in 2023.  1 pack a day for 60 years prior smoking history.  Admit to occasional alcohol intake.  She work as a tax preparor.  Born and raised in [place].    FAMILY HISTORY:  Father  at age 86 from coronary artery disease. Mother also  at age 86 from multiple medical issues.  She had 1 sister and 4/2 siblings.  One of them  from myocardial infarction.  3 children, 7 grandkids and 1 great grand kid ~ all alive and well.  No other family history of bleeding, clotting or malignant disorders in the family history.    REVIEW OF SYSTEMS:   Pertinent finding as per the history above.  All other systems have been reviewed and generally negative and noncontributory.    VITAL SIGNS  BSA: 1.75 meters squared  /70   Pulse 90   Temp 36 °C (96.8 °F) (Temporal)   Resp 22   Ht 1.529 m (5' 0.2\")   Wt 72 kg (158 lb 11.7 oz)   SpO2 98%   BMI 30.80 kg/m²     PHYSICAL EXAMINATION:  Detailed physical examination not done.    LAB DATA:  CBC from today shows a hemoglobin of 9.1 with MCV of 90.  White cell count is normal at 4.4 and platelets " are 139K.  Metabolic profile is unremarkable other than creatinine of 1.3.    RADIOLOGICAL DATA:  Brain MRI 02/10/2024  Impression:  There is a 3 mm focus of enhancement within the posterior frontal lobe on the left worrisome for an intracranial metastatic lesion. There is no associated mass effect. No midline shift.  There is a 1 mm focus of increased signal within the right parietal lobe on the diffusion-weighted images which may represent a recent infarct versus artifact. No definite associated enhancement.  Volume loss and mild degree of nonspecific white matter signal compatible with microangiopathy. 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    PET scan 1/11/2024  Impression:  1. Right lower lobe pleural-based cavitary mass, with predominantly peripheral hypermetabolic activity, consistent with patient's biopsy-proven non-small cell lung carcinoma. Additional mediastinal and right hilar hypermetabolic crow disease, as well as predominantly right-sided pleural disease is consistent with metastatic disease.  2. No evidence of hypermetabolic lymphadenopathy or metastatic disease throughout the abdomen and pelvis.  3. No evidence of hypermetabolic osseous metastatic disease.    PATHOLOGY DATA:  Surgical Pathology Exam: [id number]   FINAL DIAGNOSIS   A. LUNG, RIGHT; BIOPSY:    -- INVASIVE SQUAMOUS CELL CARCINOMA, KERATINIZING. See comment   Electronically signed by Lino Carrillo MD on 12/1/2023      PD-L1 22C3  (KEYTRUDA)Tumor Proportion Score (TPS): 5%   MICROSATELLITE STATUS: Microsatellite Instability-High (MSI-H) is NOT DETECTED.  DISEASE ASSOCIATED GENOMIC FINDINGS:   TP53 p.G245V ([id number] c.734G>T)  DISEASE RELEVANT ALTERATIONS NOT DETECTED:   Negative for ALK fusion.  Negative for BRAF V600E.  Negative for EGFR sensitizing mutation.  Negative for ERBB2 activating mutation  Negative for KRAS G12C.  Negative for MET exon 14 skipping mutation.  Negative for NTRK fusion.  Negative for RET fusion.  Negative for  ROS1 fusion.    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  TP53 mutated.  Please refer to   the details of initial presentation and management as outlined above.  In summary patient is a lifelong smoker and was noted to have a large cavitary right lower lung pleural-based lesion along with extensive mediastinal and hilar lymphadenopathy and loculated pleural effusion in November 2023.  CT-guided biopsy was confirmatory of invasive squamous cell carcinoma.  PD-L1 TPS score was 5%.  NGS panel was unremarkable other than finding of TP53 mutation.  Brain MRI showed a small 3 mm left frontal lobe lesion concerning for metastatic disease.  She received stereotactic brain radiation in March 2024 and is also currently being treated with first-line therapy with carbo/Taxol/Keytruda.  There have been multiple delays in treatment due to issues with recurrent UTIs and other complications.    Again, long discussion with the patient and she is explained about the diagnosis, stage, prognosis and treatment/management option.   For now she will continue with combination of carboplatin/Taxol/Keytruda.  Probable benefit and side effect profile of mainly myelosuppression, hair loss, weakness, fatigue, neurotoxicity, nephrotoxicity, colitis, pneumonitis, endocrinopathies etc. were explained to them in detail.  Her overall prognosis is guarded.    2.  Brain mets.  MRI results were suggestive of small solitary left parietal lobe lesion.  She is s/p stereotactic radiation therapy in Mar 2024.    3.  Recurrent UTI.  As stated above she again had a prolonged hospitalization at [facility] with E. coli UTI.  Reviewing the infectious disease notes from Dr. Schroeder, there is a suggestion regarding possibility of trying her on monthly IgG infusion.  Of note she was noted to have low IgG levels.  I will try to get her approved from the insurance if possible.    4.  Follow-up.  Follow-up with me in 3 weeks.  In  the meantime she will come to the clinic for continuation of the treatment as per the protocol.  She will also be scheduled for follow-up PET scan just prior to next visit.    This note has been transcribed using Dragon voice recognition system and there is a possibility of unintentional typing misprints.

## 2024-05-06 ENCOUNTER — INFUSION (OUTPATIENT)
Dept: HEMATOLOGY/ONCOLOGY | Facility: CLINIC | Age: 74
End: 2024-05-06
Payer: MEDICARE

## 2024-05-06 ENCOUNTER — SOCIAL WORK (OUTPATIENT)
Dept: HEMATOLOGY/ONCOLOGY | Facility: CLINIC | Age: 74
End: 2024-05-06

## 2024-05-06 ENCOUNTER — NUTRITION (OUTPATIENT)
Dept: RADIATION ONCOLOGY | Facility: CLINIC | Age: 74
End: 2024-05-06

## 2024-05-06 VITALS
HEIGHT: 60 IN | TEMPERATURE: 97.5 F | OXYGEN SATURATION: 95 % | SYSTOLIC BLOOD PRESSURE: 118 MMHG | WEIGHT: 157.85 LBS | BODY MASS INDEX: 30.99 KG/M2 | DIASTOLIC BLOOD PRESSURE: 69 MMHG | HEART RATE: 106 BPM | RESPIRATION RATE: 18 BRPM

## 2024-05-06 DIAGNOSIS — C79.31 MALIGNANT NEOPLASM METASTATIC TO BRAIN (MULTI): Primary | ICD-10-CM

## 2024-05-06 DIAGNOSIS — C79.31 MALIGNANT NEOPLASM METASTATIC TO BRAIN (MULTI): ICD-10-CM

## 2024-05-06 DIAGNOSIS — C34.31 SQUAMOUS CELL CARCINOMA OF LOWER LOBE OF RIGHT LUNG (MULTI): Primary | ICD-10-CM

## 2024-05-06 LAB
APPEARANCE UR: CLEAR
BACTERIA #/AREA URNS AUTO: ABNORMAL /HPF
BILIRUB UR STRIP.AUTO-MCNC: ABNORMAL MG/DL
COLOR UR: ABNORMAL
GLUCOSE UR STRIP.AUTO-MCNC: NEGATIVE MG/DL
HOLD SPECIMEN: NORMAL
KETONES UR STRIP.AUTO-MCNC: NEGATIVE MG/DL
LEUKOCYTE ESTERASE UR QL STRIP.AUTO: ABNORMAL
NITRITE UR QL STRIP.AUTO: ABNORMAL
PH UR STRIP.AUTO: 6 [PH]
PROT UR STRIP.AUTO-MCNC: ABNORMAL MG/DL
RBC # UR STRIP.AUTO: NEGATIVE /UL
RBC #/AREA URNS AUTO: ABNORMAL /HPF
SP GR UR STRIP.AUTO: 1.02
SQUAMOUS #/AREA URNS AUTO: ABNORMAL /HPF
UROBILINOGEN UR STRIP.AUTO-MCNC: ABNORMAL MG/DL
WBC #/AREA URNS AUTO: >50 /HPF

## 2024-05-06 PROCEDURE — 96367 TX/PROPH/DG ADDL SEQ IV INF: CPT

## 2024-05-06 PROCEDURE — 96417 CHEMO IV INFUS EACH ADDL SEQ: CPT

## 2024-05-06 PROCEDURE — 2500000001 HC RX 250 WO HCPCS SELF ADMINISTERED DRUGS (ALT 637 FOR MEDICARE OP): Performed by: INTERNAL MEDICINE

## 2024-05-06 PROCEDURE — 96523 IRRIG DRUG DELIVERY DEVICE: CPT

## 2024-05-06 PROCEDURE — 2500000004 HC RX 250 GENERAL PHARMACY W/ HCPCS (ALT 636 FOR OP/ED): Performed by: INTERNAL MEDICINE

## 2024-05-06 PROCEDURE — 96375 TX/PRO/DX INJ NEW DRUG ADDON: CPT | Mod: INF

## 2024-05-06 PROCEDURE — 96413 CHEMO IV INFUSION 1 HR: CPT

## 2024-05-06 PROCEDURE — 87086 URINE CULTURE/COLONY COUNT: CPT | Mod: PARLAB

## 2024-05-06 PROCEDURE — 81001 URINALYSIS AUTO W/SCOPE: CPT

## 2024-05-06 PROCEDURE — 96415 CHEMO IV INFUSION ADDL HR: CPT

## 2024-05-06 RX ORDER — ALBUTEROL SULFATE 0.83 MG/ML
3 SOLUTION RESPIRATORY (INHALATION) AS NEEDED
Status: DISCONTINUED | OUTPATIENT
Start: 2024-05-06 | End: 2024-05-06 | Stop reason: HOSPADM

## 2024-05-06 RX ORDER — HEPARIN 100 UNIT/ML
500 SYRINGE INTRAVENOUS AS NEEDED
Status: CANCELLED | OUTPATIENT
Start: 2024-05-06

## 2024-05-06 RX ORDER — FAMOTIDINE 10 MG/ML
20 INJECTION INTRAVENOUS ONCE
Status: COMPLETED | OUTPATIENT
Start: 2024-05-06 | End: 2024-05-06

## 2024-05-06 RX ORDER — HEPARIN SODIUM,PORCINE/PF 10 UNIT/ML
50 SYRINGE (ML) INTRAVENOUS AS NEEDED
Status: CANCELLED | OUTPATIENT
Start: 2024-05-06

## 2024-05-06 RX ORDER — DIPHENHYDRAMINE HCL 50 MG
50 CAPSULE ORAL ONCE
Status: COMPLETED | OUTPATIENT
Start: 2024-05-06 | End: 2024-05-06

## 2024-05-06 RX ORDER — DIPHENHYDRAMINE HYDROCHLORIDE 50 MG/ML
50 INJECTION INTRAMUSCULAR; INTRAVENOUS AS NEEDED
Status: DISCONTINUED | OUTPATIENT
Start: 2024-05-06 | End: 2024-05-06 | Stop reason: HOSPADM

## 2024-05-06 RX ORDER — PALONOSETRON 0.05 MG/ML
0.25 INJECTION, SOLUTION INTRAVENOUS ONCE
Status: COMPLETED | OUTPATIENT
Start: 2024-05-06 | End: 2024-05-06

## 2024-05-06 RX ORDER — EPINEPHRINE 0.3 MG/.3ML
0.3 INJECTION SUBCUTANEOUS EVERY 5 MIN PRN
Status: DISCONTINUED | OUTPATIENT
Start: 2024-05-06 | End: 2024-05-06 | Stop reason: HOSPADM

## 2024-05-06 RX ORDER — ACETAMINOPHEN 325 MG/1
325 TABLET ORAL ONCE
Status: COMPLETED | OUTPATIENT
Start: 2024-05-06 | End: 2024-05-06

## 2024-05-06 RX ORDER — PROCHLORPERAZINE MALEATE 10 MG
10 TABLET ORAL EVERY 6 HOURS PRN
Status: DISCONTINUED | OUTPATIENT
Start: 2024-05-06 | End: 2024-05-06 | Stop reason: HOSPADM

## 2024-05-06 RX ORDER — HEPARIN 100 UNIT/ML
500 SYRINGE INTRAVENOUS AS NEEDED
Status: DISCONTINUED | OUTPATIENT
Start: 2024-05-06 | End: 2024-05-06 | Stop reason: HOSPADM

## 2024-05-06 RX ORDER — PROCHLORPERAZINE EDISYLATE 5 MG/ML
10 INJECTION INTRAMUSCULAR; INTRAVENOUS EVERY 6 HOURS PRN
Status: DISCONTINUED | OUTPATIENT
Start: 2024-05-06 | End: 2024-05-06 | Stop reason: HOSPADM

## 2024-05-06 RX ORDER — FAMOTIDINE 10 MG/ML
20 INJECTION INTRAVENOUS ONCE AS NEEDED
Status: DISCONTINUED | OUTPATIENT
Start: 2024-05-06 | End: 2024-05-06 | Stop reason: HOSPADM

## 2024-05-06 RX ADMIN — SODIUM CHLORIDE 150 MG: 9 INJECTION, SOLUTION INTRAVENOUS at 10:23

## 2024-05-06 RX ADMIN — SODIUM CHLORIDE 200 MG: 9 INJECTION, SOLUTION INTRAVENOUS at 11:30

## 2024-05-06 RX ADMIN — DEXAMETHASONE SODIUM PHOSPHATE 20 MG: 10 INJECTION, SOLUTION INTRAMUSCULAR; INTRAVENOUS at 10:56

## 2024-05-06 RX ADMIN — CARBOPLATIN 300 MG: 10 INJECTION, SOLUTION INTRAVENOUS at 15:28

## 2024-05-06 RX ADMIN — PALONOSETRON 250 MCG: 0.05 INJECTION, SOLUTION INTRAVENOUS at 10:19

## 2024-05-06 RX ADMIN — FAMOTIDINE 20 MG: 10 INJECTION INTRAVENOUS at 10:16

## 2024-05-06 RX ADMIN — HEPARIN 500 UNITS: 100 SYRINGE at 16:10

## 2024-05-06 RX ADMIN — DIPHENHYDRAMINE HYDROCHLORIDE 50 MG: 50 CAPSULE ORAL at 10:15

## 2024-05-06 RX ADMIN — ACETAMINOPHEN 325 MG: 325 TABLET ORAL at 13:05

## 2024-05-06 RX ADMIN — DEXTROSE MONOHYDRATE 366 MG: 50 INJECTION, SOLUTION INTRAVENOUS at 12:11

## 2024-05-06 ASSESSMENT — PAIN DESCRIPTION - LOCATION: LOCATION: HEAD

## 2024-05-06 ASSESSMENT — PAIN SCALES - GENERAL
PAINLEVEL: 0-NO PAIN
PAINLEVEL_OUTOF10: 3

## 2024-05-06 NOTE — PROGRESS NOTES
Followed up with pt and her dtr. Pt here today for last scheduled carbo. Pt to have a PET in the next few weeks and then follow up with MD currie to discuss PET results and probable start to keytruda. Pt is struggling again with another UTI and it has been ongoing per pt since Nov 2023. Pt gets tired from it and had some side effects from the antibiotics. Pt is managing at home with her own care and needs. Pt is up and ambulating without any need for devices or assistance. Pt just wanting to feel better from the UTIs. Pt is even eager to start driving again. Pt stopped driving last Nov due to all that was happening and now wants to get back to being independent again. Dtr concerned how pt is going to get to appts in the summer when her disabled twins are off from school. Dtr stressed how overwhelmed she is feeling. Reminded dtr that On[facility] and [facility] will try and help take the stress off of her if possible with schedule and with assist in transportation when and if needed. Reminded them to call with updates on needs for this.

## 2024-05-06 NOTE — PROGRESS NOTES
NUTRITION Assessment NOTE    Nutrition Assessment     Reason for Visit:  Janee Maddox is a 74 y.o. 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 Treatment with carboplatin/taxol/keytruda.     RD met with patient and daughter during treatment. Per daughter, patient is not eating or drinking well. Not taking appetite stimulant as prescribed. Significant taste changes present.     Patient Active Problem List   Diagnosis    Abdominal aortic ectasia (CMS/HCC)    Abdominal pannus    Anxiety    Change in bowel habits    Chronic cough    Chronic cystitis    Cutaneous skin tags    Dependent edema    Depression, major, in remission (CMS/HCC)    Excess skin of abdomen    Genitourinary syndrome of menopause    GERD (gastroesophageal reflux disease)    Hypercholesteremia    Impaired fasting glucose    Nocturia    Numbness and tingling    Obesity    Osteoarthritis of wrist    Other insomnia    Rhinorrhea    Sensorineural hearing loss of both ears    Urge incontinence of urine    Urinary urgency    Vaginal itching    Class 2 obesity with body mass index (BMI) of 35.0 to 35.9 in adult    Long-term current use of benzodiazepine    ESBL (extended spectrum beta-lactamase) producing bacteria infection    Dysuria    Diverticulosis    Diverticulitis of intestine    COPD (chronic obstructive pulmonary disease) (CMS/HCC)    Collapse    Coronary atherosclerosis due to severely calcified coronary lesion    CAD (coronary artery disease)    C. difficile diarrhea    Agoraphobia    Abnormal CT scan, lung    Knee pain    Recurrent sinusitis    Panic attack    Otitis media    Open wound of anterior abdominal wall    Onychomycosis    Nicotine use disorder    Cigarette nicotine dependence with nicotine-induced disorder    Incisional hernia    ILD (interstitial lung disease) (CMS/HCC)    Generalized anxiety disorder with panic attacks    Anxiety and depression    Right knee pain    Right middle lobe pulmonary nodule     Tinnitus    Umbilical hernia    UTI (urinary tract infection)    Aortic ectasia, abdominal (CMS/HCC)    Diarrhea    Abscess    Obesity with body mass index 30 or greater    Obesity, Class I, BMI 30-34.9    Skin tag    Excess skin of abdominal wall    Gastroesophageal reflux disease    Pure hypercholesterolemia    Numbness and tingling sensation of skin    Insomnia    Sensorineural hearing loss, bilateral    Squamous cell carcinoma of lower lobe of right lung (CMS/HCC)       Lab Results   Component Value Date/Time    GLUCOSE 138 (H) 05/03/2024 1055     05/03/2024 1055    K 4.2 05/03/2024 1055     05/03/2024 1055    CO2 25 05/03/2024 1055    ANIONGAP 13 05/03/2024 1055    BUN 20 05/03/2024 1055    CREATININE 1.17 (H) 05/03/2024 1055    EGFR 48 (L) 05/03/2024 1055    CALCIUM 9.1 05/03/2024 1055    ALBUMIN 3.7 05/03/2024 1055    ALKPHOS 57 05/03/2024 1055    PROT 6.5 05/03/2024 1055    AST 15 05/03/2024 1055    BILITOT 0.3 05/03/2024 1055    ALT 12 05/03/2024 1055     Anthropometrics:    Height: 174cm  BMI: 30.6    Wt Readings from Last 20 Encounters:   05/06/24 71.6 kg (157 lb 13.6 oz)   05/03/24 72 kg (158 lb 11.7 oz)   04/19/24 71.6 kg (157 lb 13.6 oz)   04/15/24 73.3 kg (161 lb 9.6 oz)   04/12/24 73 kg (160 lb 15 oz)   04/04/24 73.6 kg (162 lb 4.1 oz)   04/03/24 72.1 kg (159 lb)   03/25/24 72.4 kg (159 lb 9.8 oz)   03/19/24 74.4 kg (164 lb)   03/11/24 73 kg (160 lb 15 oz)   03/05/24 74.7 kg (164 lb 9.6 oz)   02/26/24 73.2 kg (161 lb 6 oz)   02/21/24 73.2 kg (161 lb 6 oz)   02/19/24 73.6 kg (162 lb 4.1 oz)   02/16/24 74.8 kg (165 lb)   01/24/24 78.8 kg (173 lb 11.6 oz)   11/25/23 60.5 kg (133 lb 6.1 oz)   09/13/22 87.2 kg (192 lb 4 oz)   07/25/22 85.8 kg (189 lb 1 oz)   09/28/21 85.7 kg (189 lb)     9.1% weight loss x 4 months     Food And Nutrient Intake:  Food and Nutrient History  Food and Nutrient History: Patient and daughter report patient is historically a very picky eater.  Energy Intake: Fair  "50-75 %  Fluid Intake: drinking 64+oz fluid/day  Food Allergy: NKFA  GI Symptoms: early satiety  GI Symptoms greater than 2 weeks: yes  Oral Problems: dysgeusia     Food Intake  Meal 1: yogurt  Meal 2: lunchable  Meal 3: 1 bottle Boost High Protein, baked potato    Food Preparation  Cooking: Patient, Family  Grocery Shopping: Patient, Family    Medication and Complementary/Alternative Medicine Use  Prescription Medication Use: compazine, zofran, ativan  Vit/Minerals: Urinary and Bladder supplement (dandelion, D-mannose, cranberry), reports she is taking a vit B12/zinc supplement. Discussed taking a 30 day break from Zinc then adding back to see if this helps with taste changes (potential copper deficiency 2/2 supplementation)    Nutrition Focused Physical Exam Findings:  defer: patient appears frustrated, will defer until more appropriate time    Energy Needs  Calculated Energy Needs Using Equations  Height: 155 cm (5' 1.02\")  Based on ABW: 57.9kg    1448-1737kcals (25-30kcals/kg)  58-69g protein (1-1.2g/kg)  1448-1737mL fluid (25-30mL/kg)    Nutrition Diagnosis   Malnutrition Diagnosis  Patient has Malnutrition Diagnosis: No    Nutrition Diagnosis  Patient has Nutrition Diagnosis: Yes  Diagnosis Status (1): ongoing  Nutrition Diagnosis 1: Increased nutrient needs  Related to (1): catabolic disease and treatment    Nutrition Interventions/Recommendations   Nutrition Prescription  Individualized Nutrition Prescription Provided for : diet for cancer care    Food and Nutrition Delivery  Food and Nutrition Delivery  Meals & Snacks: Energy-modified diet, Protein-modified diet  Goals: Patient will focus on healthy/whole foods based diet, but ensure she is getting adequate protein and calories to meet nutrient needs.  Other:: Fluid  Goals: Patient will meet RD recommended fluid needs through fluids and high fluid foods.  Patient is almost done with treatment, about to start keytruda. Would expect less nutritional symptoms " with this medication. If appetite does not return in next week/two weeks, she may want to consider Remeron again. Discussed meal/snack prepping, patient not interested in this at this time. Patient is agreeable to increase Boost High Protein to BID. RD recommended further purchases of supplement such as Boost Plus/Ensure Plus to increase caloric intake.     Nutrition Education  Nutrition Education  Nutrition Education Content: Content related nutrition education  Goals: patient will understand the role diet plays in cancer care, recovery, and survivorship. Provided taste changes handout and discussed salt and soda mouth rinse    Coordination of Care  Coordination of Nutrition Care by a Nutrition Professional  Collaboration and referral of nutrition care: Collaboration by nutrition professional with other providers  Goals: RD will continue to work with oncology team to ensure best outcomes possible      Nutrition Monitoring and Evaluation   Food/Nutrient Related History Monitoring  Monitoring and Evaluation Plan: Energy intake, Protein intake, Meal/snack pattern  Energy Intake: Estimated energy intake  Criteria: patient will meet RD calculated caloric needs to support nutrition during treatment  Meal/Snack Pattern: Estimated meal and snack pattern  Criteria: patient will meet caloric needs by modifiying time and amount of food consumed (i.e. 5-6 small meals per day)  Estimated protein intake: Estimated protein intake  Criteria: patient will meet RD calculated protein needs via foods and adding ONS if needed  Body Composition/Growth/Weight History  Monitoring and Evaluation Plan: Weight change  Weight Change: Weight change percentage  Criteria: patient will maintain weight throughout treatment  Biochemical Data, Medical Tests and Procedures  Monitoring and Evaluation Plan: Electrolyte/renal panel  Electrolyte and Renal Panel: BUN, Calcium, serum, Chloride, Creatinine, Magnesium, Phosphorus, Potassium,  Sodium  Nutrition Focused Physical Findings  Monitoring and Evaluation Plan: Muscles  Muscles: Muscle atrophy  Criteria: patient will maintain muscle mass throughout treatment    Time Spent  Prep time on day of patient encounter: 10 minutes  Time spent directly with patient, family or caregiver: 20 minutes  Documentation Time: 10 minutes      Readiness to Change : Fair  Level of Understanding : Fair  Anticipated Compliant : Fair

## 2024-05-07 LAB — BACTERIA UR CULT: NORMAL

## 2024-05-09 ENCOUNTER — APPOINTMENT (OUTPATIENT)
Dept: HEMATOLOGY/ONCOLOGY | Facility: CLINIC | Age: 74
End: 2024-05-09
Payer: MEDICARE

## 2024-05-13 ENCOUNTER — APPOINTMENT (OUTPATIENT)
Dept: HEMATOLOGY/ONCOLOGY | Facility: CLINIC | Age: 74
End: 2024-05-13
Payer: MEDICARE

## 2024-05-14 ENCOUNTER — NUTRITION (OUTPATIENT)
Dept: RADIATION ONCOLOGY | Facility: CLINIC | Age: 74
End: 2024-05-14

## 2024-05-14 ENCOUNTER — INFUSION (OUTPATIENT)
Dept: HEMATOLOGY/ONCOLOGY | Facility: CLINIC | Age: 74
End: 2024-05-14
Payer: MEDICARE

## 2024-05-14 VITALS
SYSTOLIC BLOOD PRESSURE: 117 MMHG | DIASTOLIC BLOOD PRESSURE: 78 MMHG | HEART RATE: 94 BPM | OXYGEN SATURATION: 97 % | RESPIRATION RATE: 18 BRPM | TEMPERATURE: 99.3 F

## 2024-05-14 VITALS — HEIGHT: 60 IN | WEIGHT: 157.85 LBS | BODY MASS INDEX: 30.99 KG/M2

## 2024-05-14 DIAGNOSIS — D80.3 IGG DEFICIENCY (MULTI): ICD-10-CM

## 2024-05-14 DIAGNOSIS — C79.31 MALIGNANT NEOPLASM METASTATIC TO BRAIN (MULTI): ICD-10-CM

## 2024-05-14 DIAGNOSIS — C34.31 SQUAMOUS CELL CARCINOMA OF LOWER LOBE OF RIGHT LUNG (MULTI): ICD-10-CM

## 2024-05-14 PROCEDURE — 2500000001 HC RX 250 WO HCPCS SELF ADMINISTERED DRUGS (ALT 637 FOR MEDICARE OP): Performed by: INTERNAL MEDICINE

## 2024-05-14 PROCEDURE — 2500000004 HC RX 250 GENERAL PHARMACY W/ HCPCS (ALT 636 FOR OP/ED): Performed by: INTERNAL MEDICINE

## 2024-05-14 PROCEDURE — 96365 THER/PROPH/DIAG IV INF INIT: CPT | Mod: INF

## 2024-05-14 PROCEDURE — 96366 THER/PROPH/DIAG IV INF ADDON: CPT | Mod: INF

## 2024-05-14 RX ORDER — FAMOTIDINE 10 MG/ML
20 INJECTION INTRAVENOUS ONCE AS NEEDED
Status: CANCELLED | OUTPATIENT
Start: 2024-06-11

## 2024-05-14 RX ORDER — ALBUTEROL SULFATE 0.83 MG/ML
3 SOLUTION RESPIRATORY (INHALATION) AS NEEDED
Status: CANCELLED | OUTPATIENT
Start: 2024-06-11

## 2024-05-14 RX ORDER — DIPHENHYDRAMINE HYDROCHLORIDE 50 MG/ML
50 INJECTION INTRAMUSCULAR; INTRAVENOUS AS NEEDED
Status: CANCELLED | OUTPATIENT
Start: 2024-06-11

## 2024-05-14 RX ORDER — EPINEPHRINE 0.3 MG/.3ML
0.3 INJECTION SUBCUTANEOUS EVERY 5 MIN PRN
Status: CANCELLED | OUTPATIENT
Start: 2024-06-11

## 2024-05-14 RX ORDER — DIPHENHYDRAMINE HCL 25 MG
25 CAPSULE ORAL ONCE
Status: COMPLETED | OUTPATIENT
Start: 2024-05-14 | End: 2024-05-14

## 2024-05-14 RX ORDER — ACETAMINOPHEN 325 MG/1
650 TABLET ORAL ONCE
Status: CANCELLED | OUTPATIENT
Start: 2024-06-11

## 2024-05-14 RX ORDER — HEPARIN SODIUM,PORCINE/PF 10 UNIT/ML
50 SYRINGE (ML) INTRAVENOUS AS NEEDED
Status: CANCELLED | OUTPATIENT
Start: 2024-05-14

## 2024-05-14 RX ORDER — HEPARIN 100 UNIT/ML
500 SYRINGE INTRAVENOUS AS NEEDED
Status: CANCELLED | OUTPATIENT
Start: 2024-05-14

## 2024-05-14 RX ORDER — ACETAMINOPHEN 325 MG/1
650 TABLET ORAL ONCE
Status: COMPLETED | OUTPATIENT
Start: 2024-05-14 | End: 2024-05-14

## 2024-05-14 RX ORDER — DIPHENHYDRAMINE HCL 25 MG
25 CAPSULE ORAL ONCE
Status: CANCELLED | OUTPATIENT
Start: 2024-06-11

## 2024-05-14 RX ORDER — HEPARIN 100 UNIT/ML
500 SYRINGE INTRAVENOUS AS NEEDED
Status: DISCONTINUED | OUTPATIENT
Start: 2024-05-14 | End: 2024-05-14 | Stop reason: HOSPADM

## 2024-05-14 RX ADMIN — HEPARIN 500 UNITS: 100 SYRINGE at 12:45

## 2024-05-14 RX ADMIN — DIPHENHYDRAMINE HYDROCHLORIDE 25 MG: 25 CAPSULE ORAL at 09:36

## 2024-05-14 RX ADMIN — ACETAMINOPHEN 650 MG: 325 TABLET ORAL at 09:36

## 2024-05-14 RX ADMIN — IMMUNE GLOBULIN INFUSION (HUMAN) 10 G: 100 INJECTION, SOLUTION INTRAVENOUS; SUBCUTANEOUS at 09:48

## 2024-05-14 ASSESSMENT — PAIN SCALES - GENERAL: PAINLEVEL: 2

## 2024-05-14 NOTE — PROGRESS NOTES
NUTRITION Follow up NOTE    Nutrition Assessment     Reason for Visit:  Janee Maddox is a 74 y.o. 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 Treatment with carboplatin/taxol/keytruda.     RD met with patient during treatment. Recent ER visit for dehydration. Also c/o discomfort related to UTI. Per diet review, patient continues to show limited compliance to nutrition recommendations. Low PO fluid intake, has been missing hydration appointments. Not taking appetite stimulant.     Patient Active Problem List   Diagnosis    Abdominal aortic ectasia (CMS/HCC)    Abdominal pannus    Anxiety    Change in bowel habits    Chronic cough    Chronic cystitis    Cutaneous skin tags    Dependent edema    Depression, major, in remission (CMS/HCC)    Excess skin of abdomen    Genitourinary syndrome of menopause    GERD (gastroesophageal reflux disease)    Hypercholesteremia    Impaired fasting glucose    Nocturia    Numbness and tingling    Obesity    Osteoarthritis of wrist    Other insomnia    Rhinorrhea    Sensorineural hearing loss of both ears    Urge incontinence of urine    Urinary urgency    Vaginal itching    Class 2 obesity with body mass index (BMI) of 35.0 to 35.9 in adult    Long-term current use of benzodiazepine    ESBL (extended spectrum beta-lactamase) producing bacteria infection    Dysuria    Diverticulosis    Diverticulitis of intestine    COPD (chronic obstructive pulmonary disease) (CMS/HCC)    Collapse    Coronary atherosclerosis due to severely calcified coronary lesion    CAD (coronary artery disease)    C. difficile diarrhea    Agoraphobia    Abnormal CT scan, lung    Knee pain    Recurrent sinusitis    Panic attack    Otitis media    Open wound of anterior abdominal wall    Onychomycosis    Nicotine use disorder    Cigarette nicotine dependence with nicotine-induced disorder    Incisional hernia    ILD (interstitial lung disease) (CMS/HCC)    Generalized anxiety  "disorder with panic attacks    Anxiety and depression    Right knee pain    Right middle lobe pulmonary nodule    Tinnitus    Umbilical hernia    UTI (urinary tract infection)    Aortic ectasia, abdominal (CMS/HCC)    Diarrhea    Abscess    Obesity with body mass index 30 or greater    Obesity, Class I, BMI 30-34.9    Skin tag    Excess skin of abdominal wall    Gastroesophageal reflux disease    Pure hypercholesterolemia    Numbness and tingling sensation of skin    Insomnia    Sensorineural hearing loss, bilateral    Squamous cell carcinoma of lower lobe of right lung (CMS/HCC)       Lab Results   Component Value Date/Time    GLUCOSE 138 (H) 05/03/2024 1055     05/03/2024 1055    K 4.2 05/03/2024 1055     05/03/2024 1055    CO2 25 05/03/2024 1055    ANIONGAP 13 05/03/2024 1055    BUN 20 05/03/2024 1055    CREATININE 1.17 (H) 05/03/2024 1055    EGFR 48 (L) 05/03/2024 1055    CALCIUM 9.1 05/03/2024 1055    ALBUMIN 3.7 05/03/2024 1055    ALKPHOS 57 05/03/2024 1055    PROT 6.5 05/03/2024 1055    AST 15 05/03/2024 1055    BILITOT 0.3 05/03/2024 1055    ALT 12 05/03/2024 1055     Anthropometrics:  Anthropometrics  Height: 152.9 cm (5' 0.2\")  Weight: 71.6 kg (157 lb 13.6 oz)  BMI (Calculated): 30.63  Weight Change  Weight History / % Weight Change: weight has been fairly stable since initiating treatment    Wt Readings from Last 15 Encounters:   05/14/24 71.6 kg (157 lb 13.6 oz)   05/06/24 71.6 kg (157 lb 13.6 oz)   05/03/24 72 kg (158 lb 11.7 oz)   04/19/24 71.6 kg (157 lb 13.6 oz)   04/15/24 73.3 kg (161 lb 9.6 oz)   04/12/24 73 kg (160 lb 15 oz)   04/04/24 73.6 kg (162 lb 4.1 oz)   04/03/24 72.1 kg (159 lb)   03/25/24 72.4 kg (159 lb 9.8 oz)   03/19/24 74.4 kg (164 lb)   03/11/24 73 kg (160 lb 15 oz)   03/05/24 74.7 kg (164 lb 9.6 oz)   02/26/24 73.2 kg (161 lb 6 oz)   02/21/24 73.2 kg (161 lb 6 oz)   02/19/24 73.6 kg (162 lb 4.1 oz)     Food And Nutrient Intake (same as previous)  Food and Nutrient " "History  Food and Nutrient History: Patient and daughter report patient is historically a very picky eater.  Energy Intake: Fair 50-75 %  Fluid Intake: drinking 64+oz fluid/day  Food Allergy: NKFA  GI Symptoms: early satiety  GI Symptoms greater than 2 weeks: yes  Oral Problems: dysgeusia     Food Preparation  Cooking: Patient, Family  Grocery Shopping: Patient, Family    Medication and Complementary/Alternative Medicine Use  Prescription Medication Use: compazine, zofran, ativan  Vit/Minerals: Urinary and Bladder supplement (dandelion, D-mannose, cranberry), reports she is taking a vit B12/zinc supplement. Discussed taking a 30 day break from Zinc then adding back to see if this helps with taste changes (potential copper deficiency 2/2 supplementation)    Nutrition Focused Physical Exam Findings:  defer: patient appears frustrated, will continue to defer until more appropriate time    Energy Needs (continue as previous)  Calculated Energy Needs Using Equations  Height: 155 cm (5' 1.02\")  Based on ABW: 57.9kg    1448-1737kcals (25-30kcals/kg)  58-69g protein (1-1.2g/kg)  1448-1737mL fluid (25-30mL/kg)    Nutrition Diagnosis   Malnutrition Diagnosis  Patient has Malnutrition Diagnosis: No    Nutrition Diagnosis  Patient has Nutrition Diagnosis: Yes  Diagnosis Status (1): Ongoing  Related to (1): Increased nutrient needs  As Evidenced by (1): catabolic disease and treatment  Additional Assessment Information (1): SCC of right lung, tx of pembro/paclitaxel/carboplatin    Nutrition Interventions/Recommendations   Nutrition Prescription  Individualized Nutrition Prescription Provided for : diet for cancer care    Food and Nutrition Delivery  Food and Nutrition Delivery  Meals & Snacks: Energy-modified diet, Protein-modified diet  Goals: Patient will focus on healthy/whole foods based diet, but ensure she is getting adequate protein and calories to meet nutrient needs.  Other:: Fluid  Goals: Patient will meet RD " recommended fluid needs through fluids and high fluid foods.  Recommend increasing intake, multiple small meals per day. Also recommend Boost Plus 3x daily to assist in meeting nutrient needs.    Nutrition Education  Nutrition Education  Nutrition Education Content: Content related nutrition education  Goals: patient will understand the role diet plays in cancer care, recovery, and survivorship. Provided taste changes handout and discussed salt and soda mouth rinse    Coordination of Care  Coordination of Nutrition Care by a Nutrition Professional  Collaboration and referral of nutrition care: Collaboration by nutrition professional with other providers  Goals: RD will continue to work with oncology team to ensure best outcomes possible      Nutrition Monitoring and Evaluation   Food/Nutrient Related History Monitoring  Monitoring and Evaluation Plan: Energy intake, Protein intake, Meal/snack pattern  Energy Intake: Estimated energy intake  Criteria: patient will meet RD calculated caloric needs to support nutrition during treatment  Meal/Snack Pattern: Estimated meal and snack pattern  Criteria: patient will meet caloric needs by modifiying time and amount of food consumed (i.e. 5-6 small meals per day)  Estimated protein intake: Estimated protein intake  Criteria: patient will meet RD calculated protein needs via foods and adding ONS if needed  Body Composition/Growth/Weight History  Monitoring and Evaluation Plan: Weight change  Weight Change: Weight change percentage  Criteria: patient will maintain weight throughout treatment  Biochemical Data, Medical Tests and Procedures  Monitoring and Evaluation Plan: Electrolyte/renal panel  Electrolyte and Renal Panel: BUN, Calcium, serum, Chloride, Creatinine, Magnesium, Phosphorus, Potassium, Sodium  Nutrition Focused Physical Findings  Monitoring and Evaluation Plan: Muscles  Muscles: Muscle atrophy  Criteria: patient will maintain muscle mass throughout  treatment    Time Spent  Prep time on day of patient encounter: 10 minutes  Time spent directly with patient, family or caregiver: 20 minutes  Documentation Time: 10 minutes    Readiness to Change : Poor  Level of Understanding : Fair  Anticipated Compliant : Poor

## 2024-05-17 ENCOUNTER — INFUSION (OUTPATIENT)
Dept: HEMATOLOGY/ONCOLOGY | Facility: CLINIC | Age: 74
End: 2024-05-17
Payer: MEDICARE

## 2024-05-17 VITALS
HEART RATE: 103 BPM | OXYGEN SATURATION: 96 % | RESPIRATION RATE: 18 BRPM | SYSTOLIC BLOOD PRESSURE: 145 MMHG | TEMPERATURE: 97.7 F | DIASTOLIC BLOOD PRESSURE: 82 MMHG

## 2024-05-17 DIAGNOSIS — C79.31 MALIGNANT NEOPLASM METASTATIC TO BRAIN (MULTI): ICD-10-CM

## 2024-05-17 DIAGNOSIS — C34.31 SQUAMOUS CELL CARCINOMA OF LOWER LOBE OF RIGHT LUNG (MULTI): ICD-10-CM

## 2024-05-17 PROCEDURE — 2500000004 HC RX 250 GENERAL PHARMACY W/ HCPCS (ALT 636 FOR OP/ED): Performed by: INTERNAL MEDICINE

## 2024-05-17 PROCEDURE — 96361 HYDRATE IV INFUSION ADD-ON: CPT | Mod: INF

## 2024-05-17 PROCEDURE — 96360 HYDRATION IV INFUSION INIT: CPT | Mod: INF

## 2024-05-17 RX ORDER — ALBUTEROL SULFATE 0.83 MG/ML
3 SOLUTION RESPIRATORY (INHALATION) AS NEEDED
Status: CANCELLED | OUTPATIENT
Start: 2024-05-17

## 2024-05-17 RX ORDER — SODIUM CHLORIDE 9 MG/ML
500 INJECTION, SOLUTION INTRAVENOUS CONTINUOUS
Status: DISCONTINUED | OUTPATIENT
Start: 2024-05-17 | End: 2024-05-17 | Stop reason: HOSPADM

## 2024-05-17 RX ORDER — SODIUM CHLORIDE 9 MG/ML
500 INJECTION, SOLUTION INTRAVENOUS CONTINUOUS
Status: CANCELLED | OUTPATIENT
Start: 2024-05-17

## 2024-05-17 RX ORDER — HEPARIN SODIUM,PORCINE/PF 10 UNIT/ML
50 SYRINGE (ML) INTRAVENOUS AS NEEDED
Status: CANCELLED | OUTPATIENT
Start: 2024-05-17

## 2024-05-17 RX ORDER — EPINEPHRINE 0.3 MG/.3ML
0.3 INJECTION SUBCUTANEOUS EVERY 5 MIN PRN
Status: CANCELLED | OUTPATIENT
Start: 2024-05-17

## 2024-05-17 RX ORDER — HEPARIN 100 UNIT/ML
500 SYRINGE INTRAVENOUS AS NEEDED
Status: CANCELLED | OUTPATIENT
Start: 2024-05-17

## 2024-05-17 RX ORDER — DIPHENHYDRAMINE HYDROCHLORIDE 50 MG/ML
50 INJECTION INTRAMUSCULAR; INTRAVENOUS AS NEEDED
Status: CANCELLED | OUTPATIENT
Start: 2024-05-17

## 2024-05-17 RX ORDER — HEPARIN 100 UNIT/ML
500 SYRINGE INTRAVENOUS AS NEEDED
Status: DISCONTINUED | OUTPATIENT
Start: 2024-05-17 | End: 2024-05-17 | Stop reason: HOSPADM

## 2024-05-17 RX ORDER — FAMOTIDINE 10 MG/ML
20 INJECTION INTRAVENOUS ONCE AS NEEDED
Status: CANCELLED | OUTPATIENT
Start: 2024-05-17

## 2024-05-17 RX ADMIN — HEPARIN 500 UNITS: 100 SYRINGE at 14:49

## 2024-05-17 RX ADMIN — SODIUM CHLORIDE 500 ML/HR: 9 INJECTION, SOLUTION INTRAVENOUS at 12:59

## 2024-05-17 ASSESSMENT — PAIN SCALES - GENERAL: PAINLEVEL: 0-NO PAIN

## 2024-05-21 ENCOUNTER — APPOINTMENT (OUTPATIENT)
Dept: HEMATOLOGY/ONCOLOGY | Facility: CLINIC | Age: 74
End: 2024-05-21
Payer: MEDICARE

## 2024-05-21 ENCOUNTER — INFUSION (OUTPATIENT)
Dept: HEMATOLOGY/ONCOLOGY | Facility: CLINIC | Age: 74
End: 2024-05-21
Payer: MEDICARE

## 2024-05-21 VITALS
OXYGEN SATURATION: 95 % | HEART RATE: 95 BPM | RESPIRATION RATE: 18 BRPM | DIASTOLIC BLOOD PRESSURE: 74 MMHG | SYSTOLIC BLOOD PRESSURE: 132 MMHG | TEMPERATURE: 97.2 F

## 2024-05-21 DIAGNOSIS — C34.31 SQUAMOUS CELL CARCINOMA OF LOWER LOBE OF RIGHT LUNG (MULTI): ICD-10-CM

## 2024-05-21 DIAGNOSIS — C79.31 MALIGNANT NEOPLASM METASTATIC TO BRAIN (MULTI): ICD-10-CM

## 2024-05-21 PROCEDURE — 96360 HYDRATION IV INFUSION INIT: CPT | Mod: INF

## 2024-05-21 PROCEDURE — 2500000004 HC RX 250 GENERAL PHARMACY W/ HCPCS (ALT 636 FOR OP/ED): Performed by: INTERNAL MEDICINE

## 2024-05-21 RX ORDER — EPINEPHRINE 0.3 MG/.3ML
0.3 INJECTION SUBCUTANEOUS EVERY 5 MIN PRN
Status: DISCONTINUED | OUTPATIENT
Start: 2024-05-21 | End: 2024-05-21 | Stop reason: HOSPADM

## 2024-05-21 RX ORDER — SODIUM CHLORIDE 9 MG/ML
500 INJECTION, SOLUTION INTRAVENOUS CONTINUOUS
Status: CANCELLED | OUTPATIENT
Start: 2024-05-21

## 2024-05-21 RX ORDER — EPINEPHRINE 0.3 MG/.3ML
0.3 INJECTION SUBCUTANEOUS EVERY 5 MIN PRN
Status: CANCELLED | OUTPATIENT
Start: 2024-05-21

## 2024-05-21 RX ORDER — ALBUTEROL SULFATE 0.83 MG/ML
3 SOLUTION RESPIRATORY (INHALATION) AS NEEDED
Status: CANCELLED | OUTPATIENT
Start: 2024-05-21

## 2024-05-21 RX ORDER — HEPARIN 100 UNIT/ML
500 SYRINGE INTRAVENOUS AS NEEDED
Status: DISCONTINUED | OUTPATIENT
Start: 2024-05-21 | End: 2024-05-21 | Stop reason: HOSPADM

## 2024-05-21 RX ORDER — SODIUM CHLORIDE 9 MG/ML
500 INJECTION, SOLUTION INTRAVENOUS CONTINUOUS
Status: DISCONTINUED | OUTPATIENT
Start: 2024-05-21 | End: 2024-05-21 | Stop reason: HOSPADM

## 2024-05-21 RX ORDER — DIPHENHYDRAMINE HYDROCHLORIDE 50 MG/ML
50 INJECTION INTRAMUSCULAR; INTRAVENOUS AS NEEDED
Status: CANCELLED | OUTPATIENT
Start: 2024-05-21

## 2024-05-21 RX ORDER — FAMOTIDINE 10 MG/ML
20 INJECTION INTRAVENOUS ONCE AS NEEDED
Status: DISCONTINUED | OUTPATIENT
Start: 2024-05-21 | End: 2024-05-21 | Stop reason: HOSPADM

## 2024-05-21 RX ORDER — ALBUTEROL SULFATE 0.83 MG/ML
3 SOLUTION RESPIRATORY (INHALATION) AS NEEDED
Status: DISCONTINUED | OUTPATIENT
Start: 2024-05-21 | End: 2024-05-21 | Stop reason: HOSPADM

## 2024-05-21 RX ORDER — HEPARIN 100 UNIT/ML
500 SYRINGE INTRAVENOUS AS NEEDED
Status: CANCELLED | OUTPATIENT
Start: 2024-05-21

## 2024-05-21 RX ORDER — DIPHENHYDRAMINE HYDROCHLORIDE 50 MG/ML
50 INJECTION INTRAMUSCULAR; INTRAVENOUS AS NEEDED
Status: DISCONTINUED | OUTPATIENT
Start: 2024-05-21 | End: 2024-05-21 | Stop reason: HOSPADM

## 2024-05-21 RX ORDER — FAMOTIDINE 10 MG/ML
20 INJECTION INTRAVENOUS ONCE AS NEEDED
Status: CANCELLED | OUTPATIENT
Start: 2024-05-21

## 2024-05-21 RX ORDER — HEPARIN SODIUM,PORCINE/PF 10 UNIT/ML
50 SYRINGE (ML) INTRAVENOUS AS NEEDED
Status: CANCELLED | OUTPATIENT
Start: 2024-05-21

## 2024-05-21 RX ORDER — HEPARIN SODIUM,PORCINE/PF 10 UNIT/ML
50 SYRINGE (ML) INTRAVENOUS AS NEEDED
Status: DISCONTINUED | OUTPATIENT
Start: 2024-05-21 | End: 2024-05-21 | Stop reason: HOSPADM

## 2024-05-21 RX ADMIN — SODIUM CHLORIDE 500 ML/HR: 9 INJECTION, SOLUTION INTRAVENOUS at 13:13

## 2024-05-21 RX ADMIN — HEPARIN 500 UNITS: 100 SYRINGE at 14:53

## 2024-05-21 ASSESSMENT — PAIN SCALES - GENERAL
PAINLEVEL_OUTOF10: 0-NO PAIN
PAINLEVEL: 0-NO PAIN

## 2024-05-23 ENCOUNTER — HOSPITAL ENCOUNTER (OUTPATIENT)
Dept: RADIOLOGY | Facility: CLINIC | Age: 74
Discharge: HOME | End: 2024-05-23
Payer: MEDICARE

## 2024-05-23 ENCOUNTER — TELEMEDICINE (OUTPATIENT)
Dept: PALLIATIVE MEDICINE | Facility: HOSPITAL | Age: 74
End: 2024-05-23
Payer: MEDICARE

## 2024-05-23 DIAGNOSIS — C34.31 SQUAMOUS CELL CARCINOMA OF LOWER LOBE OF RIGHT LUNG (MULTI): ICD-10-CM

## 2024-05-23 DIAGNOSIS — G89.3 CANCER RELATED PAIN: Primary | ICD-10-CM

## 2024-05-23 DIAGNOSIS — Z51.5 ENCOUNTER FOR PALLIATIVE CARE: ICD-10-CM

## 2024-05-23 DIAGNOSIS — R63.0 DECREASED APPETITE: ICD-10-CM

## 2024-05-23 DIAGNOSIS — C34.11 SQUAMOUS CELL CARCINOMA OF UPPER LOBE OF RIGHT LUNG (MULTI): ICD-10-CM

## 2024-05-23 DIAGNOSIS — C79.31 MALIGNANT NEOPLASM METASTATIC TO BRAIN (MULTI): ICD-10-CM

## 2024-05-23 PROCEDURE — 3430000001 HC RX 343 DIAGNOSTIC RADIOPHARMACEUTICALS: Performed by: INTERNAL MEDICINE

## 2024-05-23 PROCEDURE — 78815 PET IMAGE W/CT SKULL-THIGH: CPT | Mod: PET TUMOR SUBSQ TX STRATEGY | Performed by: NUCLEAR MEDICINE

## 2024-05-23 PROCEDURE — 1159F MED LIST DOCD IN RCRD: CPT

## 2024-05-23 PROCEDURE — 78815 PET IMAGE W/CT SKULL-THIGH: CPT | Mod: PS

## 2024-05-23 PROCEDURE — 99212 OFFICE O/P EST SF 10 MIN: CPT

## 2024-05-23 PROCEDURE — A9552 F18 FDG: HCPCS | Performed by: INTERNAL MEDICINE

## 2024-05-23 RX ORDER — FLUDEOXYGLUCOSE F 18 200 MCI/ML
12.57 INJECTION, SOLUTION INTRAVENOUS
Status: COMPLETED | OUTPATIENT
Start: 2024-05-23 | End: 2024-05-23

## 2024-05-23 RX ADMIN — FLUDEOXYGLUCOSE F 18 12.57 MILLICURIE: 200 INJECTION, SOLUTION INTRAVENOUS at 07:59

## 2024-05-23 NOTE — PROGRESS NOTES
SUPPORTIVE AND PALLIATIVE ONCOLOGY FOLLOW-UP - OUTPATIENT      SERVICE DATE: 5/23/2024    Virtual or Telephone Consent    An interactive audio and video telecommunication system which permits real time communications between the patient (at the originating site) and provider (at the distant site) was utilized to provide this telehealth service.   Verbal consent was requested and obtained from Janee Maddox on this date, 05/23/24 for a telehealth visit.       Subjective   HISTORY OF PRESENT ILLNESS: Janee Maddox is a 74 y.o. 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 Started treatment with Pembrolizumab, Paclitaxel, and Carboplatin 2/19. Repeat PET scan 5/23/24.     Pain Assessment:  Denies pain today. Has not been taking any pain medication recently.     Symptom Assessment:  Pain:none  Headache: none  Dizziness:none  Lack of energy: a little  Difficulty sleeping: a little up every hour going to the bathroom. Currently on IV abx for recurrent UTI  Worrying: a little  Anxiety: a little  Depression: a little. Good and bad days. States this is stable  Pain in mouth/swallowing: none  Dry mouth: none  Taste changes: very much. improving  Shortness of breath: none  Lack of appetite: somewhat. Due to taste changes. Feels that she is eating better more recently. Has been receiving IV hydration  Nausea: none  Vomiting: none   Constipation: none  Diarrhea: none.   Sore muscles: none  Numbness or tingling in hands/feet/other: none  Weight loss: none  Other: none      Information obtained from: chart review, interview of patient, and interview of family  ______________________________________________________________________     Oncology History   Squamous cell carcinoma of lower lobe of right lung (Multi)   1/24/2024 Initial Diagnosis    Squamous cell carcinoma of lower lobe of right lung (CMS/HCC)     2/19/2024 -  Chemotherapy     Pembrolizumab + PACLitaxel / CARBOplatin, 21 Day Cycles         Past Medical History:   Diagnosis Date    Agoraphobia, unspecified 12/06/2016    Agoraphobia    Body mass index (BMI) 33.0-33.9, adult     BMI 33.0-33.9,adult    Chronic sinusitis, unspecified     Recurrent sinus infections    Coronary artery disease     Cutaneous abscess, unspecified 06/01/2016    Abscess    Disruption of external operation (surgical) wound, not elsewhere classified, initial encounter 07/31/2017    Open abdominal incision with drainage    Diverticulitis of intestine, part unspecified, without perforation or abscess without bleeding 08/13/2018    Acute diverticulitis    Encounter for immunization 04/21/2021    Encounter for immunization    Encounter for screening for lipoid disorders 04/26/2016    Screening cholesterol level    Hypercholesteremia     Hypertension     IgG deficiency (Multi) 05/03/2024    Incisional hernia without obstruction or gangrene 09/11/2015    Incisional hernia    Infection following a procedure, other surgical site, initial encounter 04/20/2017    Incisional infection    Local infection of the skin and subcutaneous tissue, unspecified 05/01/2019    Skin infection    Other conditions influencing health status     Complete Colonoscopy    Other specified postprocedural states 05/15/2018    History of incision and drainage    Otitis media, unspecified, left ear 05/19/2014    Otitis media, left    Pain in right knee 02/16/2015    Bilateral knee pain    Personal history of other diseases of the circulatory system     History of hypertension    Personal history of other infectious and parasitic diseases 07/03/2018    History of onychomycosis    Personal history of other infectious and parasitic diseases 10/18/2019    History of Clostridioides difficile colitis    Personal history of other specified conditions 04/20/2017    History of dysuria    Personal history of other specified conditions 02/02/2017    History of  nocturia    Personal history of other specified conditions 10/18/2019    History of diarrhea    Personal history of other specified conditions 12/06/2019    History of dysuria    Personal history of other specified conditions 02/16/2015    History of dyspnea    Personal history of urinary (tract) infections 12/11/2019    History of urinary tract infection    Squamous cell carcinoma of lower lobe of right lung (Multi) 01/24/2024    Syncope and collapse 01/05/2018    Collapse    Tinnitus, left ear 08/29/2014    Tinnitus of left ear    Unspecified open wound of abdominal wall, unspecified quadrant without penetration into peritoneal cavity, initial encounter 08/23/2019    Wound, open, abdominal wall, anterior     Past Surgical History:   Procedure Laterality Date    CARPAL TUNNEL RELEASE  05/20/2014    Neuroplasty Decompression Median Nerve At Carpal Tunnel    CHOLECYSTECTOMY  05/20/2014    Cholecystectomy    COLONOSCOPY  01/08/2021    Complete Colonoscopy    CT GUIDED ASPIRATION OF ABSCESS, HEMATOMA, CYST  11/27/2023    CT GUIDED ASPIRATION OF ABSCESS, HEMATOMA, CYST 11/27/2023 PAR CT    CT GUIDED PERCUTANEOUS BIOPSY LUNG  11/27/2023    CT GUIDED PERCUTANEOUS BIOPSY LUNG 11/27/2023 PAR CT    HYSTERECTOMY  05/20/2014    Hysterectomy    KNEE ARTHROSCOPY W/ DEBRIDEMENT  02/16/2015    Knee Arthroscopy (Therapeutic)    OTHER SURGICAL HISTORY  08/20/2019    Incisional Hernia Repair    RECTAL VAGINAL FISTULECTOMY  05/20/2014    Rectocele Repair     Family History   Problem Relation Name Age of Onset    Hypertension Mother      Other (cardiac disorder) Father      Heart attack Maternal Grandfather          SOCIAL HISTORY  Children 3, Grandchildren 7 and 1 great grandkid, Support system family and friends, 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 Her smoking use included  cigarettes. She started smoking about 62 years ago. She has a 62 pack-year smoking history. She does not have any smokeless tobacco history on file. She reports that she does not currently use alcohol. She reports that she does not use drugs. Smoked 1 PPD until November 2023.     REVIEW OF SYSTEMS  Review of systems negative unless noted in HPI.       Objective     Current Outpatient Medications   Medication Instructions    albuterol (Ventolin HFA) 90 mcg/actuation inhaler 2 puffs, inhalation, As needed    ALBUTEROL INHL 2 puffs, Inhalation, [id number], PRN PRN for wheezing, # 18 g, Refill(s) 2, Date: 10/12/2023 14:11:00 EDT, Pharmacy: [facility] #[id number], 2 puffs Inhalation [id number],PRN:for wheezing, 154.94, 09/26/2023 19:59:00 EDT, Height/Length Dosing, cm, 86.8, 07/30/2023...    estradiol (Estrace) 0.01 % (0.1 mg/gram) vaginal cream Apply daily 1 gram (large pea) for 3 weeks, then 3 times per week.    fosfomycin (Monurol) 3 gram packet 3 gram every 72 hrs up to 3 doses total    LORazepam (Ativan) 1 mg tablet 1 tablet each morning and 1/2 tablet each evening This is a reduced dose    mirtazapine (REMERON) 15 mg, oral, Nightly, Take half tablet every night for 7 days and then 1 tablet every night    omeprazole (PRILOSEC) 20 mg, oral, Daily before breakfast    ondansetron (ZOFRAN) 8 mg, oral, Every 8 hours PRN    peppermint oiL liquid Topical    prochlorperazine (COMPAZINE) 10 mg, oral, Every 6 hours PRN    psyllium seed, with sugar, (METAMUCIL, SUGAR, ORAL) ORAL, Refill(s) 0, Date: 07/08/2021 15:08:00 EDT    solifenacin (VESICARE) 5 mg, oral, Daily, Swallow tablet whole; do not crush, chew, or split.       Allergies:   Allergies   Allergen Reactions    Nitrofurantoin Monohyd/M-Cryst Anaphylaxis    Codeine Unknown    Nitrofurantoin Itching    Penicillin Hives and Other     pt tolerates cephalosporins    Penicillin G Unknown     Tolerated cefepime during 10/2019 admission   Tolerated ceftriaxone 7/2018 admission     Penicillins Rash    Tetracyclines Rash       No visits with results within 1 Week(s) from this visit.   Latest known visit with results is:   Infusion on 05/06/2024   Component Date Value Ref Range Status    Color, Urine 05/06/2024 [place] (N)  Straw, Yellow Final    Appearance, Urine 05/06/2024 Clear  Clear Final    Specific Gravity, Urine 05/06/2024 1.020  1.005 - 1.035 Final    pH, Urine 05/06/2024 6.0  5.0, 5.5, 6.0, 6.5, 7.0, 7.5, 8.0 Final    Protein, Urine 05/06/2024 30 (1+) (A)  NEGATIVE, 10 (TRACE) mg/dL Final    Glucose, Urine 05/06/2024 NEGATIVE  NEGATIVE mg/dL Final    Blood, Urine 05/06/2024 NEGATIVE  NEGATIVE Final    Ketones, Urine 05/06/2024 NEGATIVE  NEGATIVE mg/dL Final    Bilirubin, Urine 05/06/2024 0.5 (1+) (A)  NEGATIVE Final    Urobilinogen, Urine 05/06/2024 4 (2+) (N)  NORM mg/dL Final    Some pigments and medications may cause a false positive urobilinogen.    Nitrite, Urine 05/06/2024 2+ (A)  NEGATIVE Final    Leukocyte Esterase, Urine 05/06/2024 500 Kyara/µL (A)  NEGATIVE Final    Extra Tube 05/06/2024 Hold for add-ons.   Final    Auto resulted.    WBC, Urine 05/06/2024 >50 (A)  1-5, NONE /HPF Final    RBC, Urine 05/06/2024 1-2  NONE, 1-2, 3-5 /HPF Final    Squamous Epithelial Cells, Urine 05/06/2024 10-25 (FEW)  Reference range not established. /HPF Final    Bacteria, Urine 05/06/2024 2+ (A)  NONE SEEN /HPF Final    Urine Culture 05/06/2024 No significant growth   Final     NM PET CT lung CA staging    Result Date: 5/23/2024  Interpreted By:  Fish Maria  and Giselle Monet STUDY: NM PET CT LUNG CA STAGING; 5/23/2024 9:20 am   INDICATION: Signs/Symptoms:Metastatic right lung squamous cell carcinoma.  On chemoimmunotherapy.  Follow-up study..   COMPARISON: PET-CT dated 05/23/2024   ACCESSION NUMBER(S): [id number]   ORDERING CLINICIAN: IRWIN COBB   TECHNIQUE: DIVISION OF NUCLEAR MEDICINE POSITRON EMISSION TOMOGRAPHY (PET-CT)   The patient received an intravenous dose of 12.57 mCi of  Fluorine-18 fluorodeoxyglucose (FDG). Positron emission tomographic (PET) images from mid-thigh to skull base were then acquired after a one hour delay. Also acquired was a contemporaneous low dose non-contrast CT scan performed for attenuation correction of PET images and anatomic localization.  The PET and CT images were digitally fused for display.  All images were acquired on a combined PET-CT scanner unit. Some areas of FDG accumulation may be described in standardized uptake value (SUV) units.   CODING: Subsequent Treatment Strategy (PS)   CALIBRATION: Dose Injection-to-Scan Interval (mins): 56 min Mediastinal bloodpool SUV (normal 1.5-2.5): 2.5 Blood glucose: 114 mg/dL   FINDINGS: NECK: There is prominent muscular activity within the neck musculature. No focal hypermetabolic soft tissue lesion is seen in the neck. No hypermetabolic cervical lymphadenopathy is present.   CHEST: Significant interval reduction in size and metabolic activity within a centrally cavitary right lower lobe lung mass compatible with patient's known squamous cell carcinoma. There is residual peripheral activity within the mass measuring up to 3.8 SUV with invasion into the right 7th through 10th ribs and T9 and T10 vertebral bodies. No new hypermetabolic pulmonary nodules. Significant interval reduction in size and metabolic activity within multiple mediastinal lymph nodes with mild hypermetabolic activity within right paratracheal lymph nodes measuring 2.3, previously measuring up to 3.6, and mild hypermetabolic activity within right perihilar lymph nodes with maximum SUV 3.1, previously measuring up to 4.4. Additionally, there is been interval resolution of hypermetabolic activity within multiple subcarinal lymph nodes. No new hypermetabolic mediastinal lymphadenopathy.   ABDOMEN AND PELVIS: No hypermetabolic soft tissue lesion is present in the abdomen and pelvis. No evidence of hypermetabolic lymphadenopathy. Physiologic  radiotracer uptake is present in the liver and spleen with excretion into the bowel loops and the genitourinary tract. Similar aneurysmal dilation of the infrarenal abdominal aorta measuring up to 3.4 cm.   MUSCULOSKELETAL: Interval development moderate hypermetabolic activity associated with the right sternoclavicular joint with maximum SUV of 3.7 with associated severe degenerative changes. No additional focal hypermetabolic osseous lesions to suggest osseous metastatic disease.       1.  Significantly decreased size and metabolic activity within a cavitary right lower lobe lung mass invading the right chest wall with residual moderate hypermetabolic activity compatible with residual viable disease. No new hypermetabolic pulmonary nodules. 2. Significant interval decrease in size and metabolic activity with mediastinal lymphadenopathy with residual metabolic activity concerning for residual viable disease. No new hypermetabolic lymphadenopathy within the chest, abdomen, or pelvis. 3. Interval development of focal hypermetabolic activity at the right sternoclavicular joint favored to be degenerative in nature. No evidence of hypermetabolic osseous metastatic disease throughout the remainder of the body.       I personally reviewed the image(s) / study and agree with the findings and interpretation as stated. This study was interpreted at [facility].   Signed by: Fish Maria 5/23/2024 12:40 PM Dictation workstation:   [id number]       PHYSICAL EXAMINATION  Vital Signs: not completed due to virtual visit       Physical Exam not completed due to virtual visit    ASSESSMENT/PLAN    Pain  Pain is: cancer related pain  Type: somatic  Pain control: well-controlled  Home regimen:   - Continue Norco 5-325 mg every 4-6 hours as needed for pain - no longer taking  - Continue Acetaminophen 500-1000 mg every 8 hours   - Has Tramadol 50 mg tabs at home - discussed not using concurrently  with Norco  - Was prescribed Percocet 5-325 mg by the ER but has not used  - Allergy to codeine but has tolerated medications listed above    Opioid Use  Medication Management:   - OARRS report reviewed with no aberrant behavior; consistent with  prescriptions/records and patient history  - MED 00.  Overdose Risk Score 120.   This has been discussed with patient.   - We will continue to closely monitor the patient for signs of prescription misuse including UDS, OARRS review and subjective reports at each visit.  - concurrent benzodiazepine use with Lorazepam   - I am a provider who either is or has consulted and collaborated with a provider certified in Hospice and Palliative Medicine and have conducted a face-face visit and examination for this patient.  - Routine Urine Drug Screen completed 2/21/24 negative for illicit substances  - Controlled Substance Agreement completed 2/21/24  - Specifically discussed that controlled substance prescriptions will only be provided by our group as outlined in the completed agreement  - Prescribed naloxone discuss sending next visit  - Red Flags: none     Nausea   At risk for nausea without vomiting related to chemotherapy   - Continue Ondansetron 8 mg every 8 hours as needed  - Continue prochlorperazine 10 mg every 6 hours as needed    Bowels   At risk for constipation related to opioids,  currently not constipated   Usual bowel pattern: varies multiple times per day or every 3-4 days   Current regimen: n/a  - Continue Imodium 2 mg with each episode of diarrhea - do not exceed 16 mg/day  - Continue Pepto-bismol as needed   LBM 2/21/24     Altered Mood  Chronic anxiety and depression  controlled with home regimen  Current regimen:   - Continue Wellbutrin 300 mg daily  - Continue Lorazepam 1 mg BID  - Follows with a psychiatrist     Sleeping Difficulty:  Impaired sleep related to pain  Current regimen:    - Continue pain medications as indicated above    Decreased  appetite  Related to malignancy  Nutrition consult  Current regimen:    - Encouraged smaller, more frequent meals  - Continue to drink boost daily- increase as tolerated  - No longer taking Mirtazapine - felt that this caused diarrhea    Advance Directives  Existence of Advance Directives:Unknown- patient going to look for these documents at home and bring them in  Decision maker: TODDOA is Ronna Simpson  Code Status: DNR    Next Follow-Up Visit:  Return to clinic as needed    Signature and billing  Thank you for allowing us to participate in the care of this patient. Recommendations will be communicated back to the consulting service by way of shared electronic medical record or face-to-face.    Medical complexity was high level due to due to complexity of problems, extensive data review, and high risk of management/treatment.  Time was spent on the following: Prep Time, Time Directly with Patient/Family/Caregiver, Documentation Time. Total time spent: 15      DATA   Diagnostic tests and information reviewed for today's visit:  Most recent labs, Most recent imaging, Medications       Plan of Care discussed with: Patient and Family/Significant Other: daughter      SIGNATURE: HUANG Govea    Contact information:  Supportive and Palliative Oncology  Monday-Friday 8 AM-5 PM  Phone:  [phone], press option #5, then option #1.   Or Epic Secure Chat

## 2024-05-24 RX ORDER — PROCHLORPERAZINE EDISYLATE 5 MG/ML
10 INJECTION INTRAMUSCULAR; INTRAVENOUS EVERY 6 HOURS PRN
Status: CANCELLED | OUTPATIENT
Start: 2024-05-30

## 2024-05-24 RX ORDER — ALBUTEROL SULFATE 0.83 MG/ML
3 SOLUTION RESPIRATORY (INHALATION) AS NEEDED
Status: CANCELLED | OUTPATIENT
Start: 2024-05-30

## 2024-05-24 RX ORDER — PROCHLORPERAZINE MALEATE 10 MG
10 TABLET ORAL EVERY 6 HOURS PRN
Status: CANCELLED | OUTPATIENT
Start: 2024-05-30

## 2024-05-24 RX ORDER — FAMOTIDINE 10 MG/ML
20 INJECTION INTRAVENOUS ONCE AS NEEDED
Status: CANCELLED | OUTPATIENT
Start: 2024-05-30

## 2024-05-24 RX ORDER — DIPHENHYDRAMINE HYDROCHLORIDE 50 MG/ML
50 INJECTION INTRAMUSCULAR; INTRAVENOUS AS NEEDED
Status: CANCELLED | OUTPATIENT
Start: 2024-05-30

## 2024-05-24 RX ORDER — EPINEPHRINE 0.3 MG/.3ML
0.3 INJECTION SUBCUTANEOUS EVERY 5 MIN PRN
Status: CANCELLED | OUTPATIENT
Start: 2024-05-30

## 2024-05-27 ENCOUNTER — DOCUMENTATION (OUTPATIENT)
Dept: BEHAVIORAL HEALTH | Facility: CLINIC | Age: 74
End: 2024-05-27
Payer: MEDICARE

## 2024-05-27 DIAGNOSIS — F41.1 GAD (GENERALIZED ANXIETY DISORDER): ICD-10-CM

## 2024-05-27 RX ORDER — LORAZEPAM 1 MG/1
TABLET ORAL
Qty: 45 TABLET | Refills: 0 | Status: SHIPPED | OUTPATIENT
Start: 2024-05-27 | End: 2024-05-29 | Stop reason: SDUPTHER

## 2024-05-27 RX ORDER — LORAZEPAM 1 MG/1
TABLET ORAL
Qty: 45 TABLET | Refills: 0 | Status: SHIPPED | OUTPATIENT
Start: 2024-05-27 | End: 2024-05-27

## 2024-05-27 NOTE — PROGRESS NOTES
Nonbillable note : received voicemail pertaining to need for lorazepam script , reviewed oarrs , oarrs is consistent , reviewed the note per oncology most recent in the [facility]  and reviewed most recent psychiatry appointment note ,in the [facility] and reviewed med order history in [facility] , she needs to schedule for follow up , request for script is appropriate . Sent script to pharmacy , sent note for staff to schedule patient for follow up within one month [facility]

## 2024-05-28 ENCOUNTER — INFUSION (OUTPATIENT)
Dept: HEMATOLOGY/ONCOLOGY | Facility: CLINIC | Age: 74
End: 2024-05-28
Payer: MEDICARE

## 2024-05-28 ENCOUNTER — TELEPHONE (OUTPATIENT)
Dept: BEHAVIORAL HEALTH | Facility: CLINIC | Age: 74
End: 2024-05-28
Payer: MEDICARE

## 2024-05-28 VITALS
DIASTOLIC BLOOD PRESSURE: 58 MMHG | TEMPERATURE: 98.1 F | RESPIRATION RATE: 18 BRPM | SYSTOLIC BLOOD PRESSURE: 120 MMHG | OXYGEN SATURATION: 96 % | HEART RATE: 89 BPM

## 2024-05-28 DIAGNOSIS — C34.31 SQUAMOUS CELL CARCINOMA OF LOWER LOBE OF RIGHT LUNG (MULTI): Primary | ICD-10-CM

## 2024-05-28 DIAGNOSIS — C79.31 MALIGNANT NEOPLASM METASTATIC TO BRAIN (MULTI): ICD-10-CM

## 2024-05-28 PROCEDURE — 2500000004 HC RX 250 GENERAL PHARMACY W/ HCPCS (ALT 636 FOR OP/ED): Performed by: INTERNAL MEDICINE

## 2024-05-28 PROCEDURE — 96360 HYDRATION IV INFUSION INIT: CPT | Mod: INF

## 2024-05-28 PROCEDURE — 96361 HYDRATE IV INFUSION ADD-ON: CPT | Mod: INF

## 2024-05-28 RX ORDER — ALBUTEROL SULFATE 0.83 MG/ML
3 SOLUTION RESPIRATORY (INHALATION) AS NEEDED
Status: CANCELLED | OUTPATIENT
Start: 2024-05-28

## 2024-05-28 RX ORDER — HEPARIN SODIUM,PORCINE/PF 10 UNIT/ML
50 SYRINGE (ML) INTRAVENOUS AS NEEDED
Status: CANCELLED | OUTPATIENT
Start: 2024-05-28

## 2024-05-28 RX ORDER — HEPARIN 100 UNIT/ML
500 SYRINGE INTRAVENOUS AS NEEDED
Status: CANCELLED | OUTPATIENT
Start: 2024-05-28

## 2024-05-28 RX ORDER — DIPHENHYDRAMINE HYDROCHLORIDE 50 MG/ML
50 INJECTION INTRAMUSCULAR; INTRAVENOUS AS NEEDED
Status: CANCELLED | OUTPATIENT
Start: 2024-05-28

## 2024-05-28 RX ORDER — SODIUM CHLORIDE 9 MG/ML
500 INJECTION, SOLUTION INTRAVENOUS ONCE
Status: COMPLETED | OUTPATIENT
Start: 2024-05-28 | End: 2024-05-28

## 2024-05-28 RX ORDER — HEPARIN 100 UNIT/ML
500 SYRINGE INTRAVENOUS AS NEEDED
Status: DISCONTINUED | OUTPATIENT
Start: 2024-05-28 | End: 2024-05-28 | Stop reason: HOSPADM

## 2024-05-28 RX ORDER — SODIUM CHLORIDE 9 MG/ML
500 INJECTION, SOLUTION INTRAVENOUS CONTINUOUS
Status: CANCELLED | OUTPATIENT
Start: 2024-05-28

## 2024-05-28 RX ORDER — FAMOTIDINE 10 MG/ML
20 INJECTION INTRAVENOUS ONCE AS NEEDED
Status: CANCELLED | OUTPATIENT
Start: 2024-05-28

## 2024-05-28 RX ORDER — EPINEPHRINE 0.3 MG/.3ML
0.3 INJECTION SUBCUTANEOUS EVERY 5 MIN PRN
Status: CANCELLED | OUTPATIENT
Start: 2024-05-28

## 2024-05-28 RX ADMIN — HEPARIN 500 UNITS: 100 SYRINGE at 13:19

## 2024-05-28 RX ADMIN — SODIUM CHLORIDE 500 ML/HR: 9 INJECTION, SOLUTION INTRAVENOUS at 11:29

## 2024-05-28 ASSESSMENT — PAIN SCALES - GENERAL
PAINLEVEL: 0-NO PAIN
PAINLEVEL_OUTOF10: 0-NO PAIN

## 2024-05-29 ENCOUNTER — DOCUMENTATION (OUTPATIENT)
Dept: BEHAVIORAL HEALTH | Facility: CLINIC | Age: 74
End: 2024-05-29
Payer: MEDICARE

## 2024-05-29 ENCOUNTER — TELEPHONE (OUTPATIENT)
Dept: BEHAVIORAL HEALTH | Facility: CLINIC | Age: 74
End: 2024-05-29
Payer: MEDICARE

## 2024-05-29 DIAGNOSIS — F41.1 GAD (GENERALIZED ANXIETY DISORDER): ICD-10-CM

## 2024-05-29 RX ORDER — LORAZEPAM 1 MG/1
TABLET ORAL
Qty: 45 TABLET | Refills: 0 | Status: SHIPPED | OUTPATIENT
Start: 2024-05-29 | End: 2024-05-29 | Stop reason: SDUPTHER

## 2024-05-29 RX ORDER — LORAZEPAM 1 MG/1
TABLET ORAL
Qty: 45 TABLET | Refills: 0 | Status: SHIPPED | OUTPATIENT
Start: 2024-05-29

## 2024-05-29 NOTE — PROGRESS NOTES
Non billable note : sent replacement script to pharmacy for lorazepam , patient left a message she has medicine for today and will pick this up tomorrow from pharmacy .[facility]

## 2024-05-29 NOTE — PROGRESS NOTES
Nonbillable note : resent script to [facility] per [facility] pharmacist request for replacement script [facility]

## 2024-05-30 ENCOUNTER — OFFICE VISIT (OUTPATIENT)
Dept: HEMATOLOGY/ONCOLOGY | Facility: CLINIC | Age: 74
End: 2024-05-30
Payer: MEDICARE

## 2024-05-30 ENCOUNTER — INFUSION (OUTPATIENT)
Dept: HEMATOLOGY/ONCOLOGY | Facility: CLINIC | Age: 74
End: 2024-05-30
Payer: MEDICARE

## 2024-05-30 VITALS
BODY MASS INDEX: 30.3 KG/M2 | RESPIRATION RATE: 20 BRPM | DIASTOLIC BLOOD PRESSURE: 61 MMHG | TEMPERATURE: 97.9 F | SYSTOLIC BLOOD PRESSURE: 116 MMHG | HEIGHT: 60 IN | OXYGEN SATURATION: 97 % | HEART RATE: 94 BPM | WEIGHT: 154.32 LBS

## 2024-05-30 DIAGNOSIS — C34.31 SQUAMOUS CELL CARCINOMA OF LOWER LOBE OF RIGHT LUNG (MULTI): ICD-10-CM

## 2024-05-30 DIAGNOSIS — C79.31 MALIGNANT NEOPLASM METASTATIC TO BRAIN (MULTI): ICD-10-CM

## 2024-05-30 LAB
ALBUMIN SERPL BCP-MCNC: 3.9 G/DL (ref 3.4–5)
ALP SERPL-CCNC: 57 U/L (ref 33–136)
ALT SERPL W P-5'-P-CCNC: 11 U/L (ref 7–45)
ANION GAP SERPL CALC-SCNC: 12 MMOL/L (ref 10–20)
AST SERPL W P-5'-P-CCNC: 14 U/L (ref 9–39)
BASOPHILS # BLD AUTO: 0.05 X10*3/UL (ref 0–0.1)
BASOPHILS NFR BLD AUTO: 1.3 %
BILIRUB SERPL-MCNC: 0.3 MG/DL (ref 0–1.2)
BUN SERPL-MCNC: 22 MG/DL (ref 6–23)
CALCIUM SERPL-MCNC: 9.4 MG/DL (ref 8.6–10.3)
CHLORIDE SERPL-SCNC: 106 MMOL/L (ref 98–107)
CO2 SERPL-SCNC: 25 MMOL/L (ref 21–32)
CREAT SERPL-MCNC: 1.04 MG/DL (ref 0.5–1.05)
EGFRCR SERPLBLD CKD-EPI 2021: 56 ML/MIN/1.73M*2
EOSINOPHIL # BLD AUTO: 0.13 X10*3/UL (ref 0–0.4)
EOSINOPHIL NFR BLD AUTO: 3.4 %
ERYTHROCYTE [DISTWIDTH] IN BLOOD BY AUTOMATED COUNT: 18.6 % (ref 11.5–14.5)
GLUCOSE SERPL-MCNC: 127 MG/DL (ref 74–99)
HCT VFR BLD AUTO: 31 % (ref 36–46)
HGB BLD-MCNC: 9.9 G/DL (ref 12–16)
IMM GRANULOCYTES # BLD AUTO: 0.02 X10*3/UL (ref 0–0.5)
IMM GRANULOCYTES NFR BLD AUTO: 0.5 % (ref 0–0.9)
LYMPHOCYTES # BLD AUTO: 1.02 X10*3/UL (ref 0.8–3)
LYMPHOCYTES NFR BLD AUTO: 26.4 %
MCH RBC QN AUTO: 31.7 PG (ref 26–34)
MCHC RBC AUTO-ENTMCNC: 31.9 G/DL (ref 32–36)
MCV RBC AUTO: 99 FL (ref 80–100)
MONOCYTES # BLD AUTO: 0.29 X10*3/UL (ref 0.05–0.8)
MONOCYTES NFR BLD AUTO: 7.5 %
NEUTROPHILS # BLD AUTO: 2.35 X10*3/UL (ref 1.6–5.5)
NEUTROPHILS NFR BLD AUTO: 60.9 %
NRBC BLD-RTO: 0 /100 WBCS (ref 0–0)
PLATELET # BLD AUTO: 155 X10*3/UL (ref 150–450)
POTASSIUM SERPL-SCNC: 4.3 MMOL/L (ref 3.5–5.3)
PROT SERPL-MCNC: 6.7 G/DL (ref 6.4–8.2)
RBC # BLD AUTO: 3.12 X10*6/UL (ref 4–5.2)
SODIUM SERPL-SCNC: 139 MMOL/L (ref 136–145)
TSH SERPL-ACNC: 0.96 MIU/L (ref 0.44–3.98)
WBC # BLD AUTO: 3.9 X10*3/UL (ref 4.4–11.3)

## 2024-05-30 PROCEDURE — 2500000004 HC RX 250 GENERAL PHARMACY W/ HCPCS (ALT 636 FOR OP/ED): Performed by: INTERNAL MEDICINE

## 2024-05-30 PROCEDURE — 99215 OFFICE O/P EST HI 40 MIN: CPT | Performed by: INTERNAL MEDICINE

## 2024-05-30 PROCEDURE — 1159F MED LIST DOCD IN RCRD: CPT | Performed by: INTERNAL MEDICINE

## 2024-05-30 PROCEDURE — 84443 ASSAY THYROID STIM HORMONE: CPT

## 2024-05-30 PROCEDURE — 85025 COMPLETE CBC W/AUTO DIFF WBC: CPT

## 2024-05-30 PROCEDURE — 1126F AMNT PAIN NOTED NONE PRSNT: CPT | Performed by: INTERNAL MEDICINE

## 2024-05-30 PROCEDURE — 80053 COMPREHEN METABOLIC PANEL: CPT

## 2024-05-30 PROCEDURE — 96409 CHEMO IV PUSH SNGL DRUG: CPT

## 2024-05-30 RX ORDER — HEPARIN SODIUM,PORCINE/PF 10 UNIT/ML
50 SYRINGE (ML) INTRAVENOUS AS NEEDED
Status: DISCONTINUED | OUTPATIENT
Start: 2024-05-30 | End: 2024-05-30 | Stop reason: HOSPADM

## 2024-05-30 RX ORDER — PROCHLORPERAZINE MALEATE 10 MG
10 TABLET ORAL EVERY 6 HOURS PRN
Status: DISCONTINUED | OUTPATIENT
Start: 2024-05-30 | End: 2024-05-30 | Stop reason: HOSPADM

## 2024-05-30 RX ORDER — HEPARIN 100 UNIT/ML
500 SYRINGE INTRAVENOUS AS NEEDED
Status: DISCONTINUED | OUTPATIENT
Start: 2024-05-30 | End: 2024-05-30 | Stop reason: HOSPADM

## 2024-05-30 RX ORDER — DIPHENHYDRAMINE HYDROCHLORIDE 50 MG/ML
50 INJECTION INTRAMUSCULAR; INTRAVENOUS AS NEEDED
Status: DISCONTINUED | OUTPATIENT
Start: 2024-05-30 | End: 2024-05-30 | Stop reason: HOSPADM

## 2024-05-30 RX ORDER — EPINEPHRINE 0.3 MG/.3ML
0.3 INJECTION SUBCUTANEOUS EVERY 5 MIN PRN
Status: DISCONTINUED | OUTPATIENT
Start: 2024-05-30 | End: 2024-05-30 | Stop reason: HOSPADM

## 2024-05-30 RX ORDER — ALBUTEROL SULFATE 0.83 MG/ML
3 SOLUTION RESPIRATORY (INHALATION) AS NEEDED
Status: DISCONTINUED | OUTPATIENT
Start: 2024-05-30 | End: 2024-05-30 | Stop reason: HOSPADM

## 2024-05-30 RX ORDER — HEPARIN SODIUM,PORCINE/PF 10 UNIT/ML
50 SYRINGE (ML) INTRAVENOUS AS NEEDED
Status: CANCELLED | OUTPATIENT
Start: 2024-05-30

## 2024-05-30 RX ORDER — HEPARIN 100 UNIT/ML
500 SYRINGE INTRAVENOUS AS NEEDED
Status: CANCELLED | OUTPATIENT
Start: 2024-05-30

## 2024-05-30 RX ORDER — PROCHLORPERAZINE EDISYLATE 5 MG/ML
10 INJECTION INTRAMUSCULAR; INTRAVENOUS EVERY 6 HOURS PRN
Status: DISCONTINUED | OUTPATIENT
Start: 2024-05-30 | End: 2024-05-30 | Stop reason: HOSPADM

## 2024-05-30 RX ORDER — FAMOTIDINE 10 MG/ML
20 INJECTION INTRAVENOUS ONCE AS NEEDED
Status: DISCONTINUED | OUTPATIENT
Start: 2024-05-30 | End: 2024-05-30 | Stop reason: HOSPADM

## 2024-05-30 RX ADMIN — SODIUM CHLORIDE 400 MG: 9 INJECTION, SOLUTION INTRAVENOUS at 12:14

## 2024-05-30 RX ADMIN — HEPARIN 500 UNITS: 100 SYRINGE at 12:56

## 2024-05-30 ASSESSMENT — PAIN SCALES - GENERAL: PAINLEVEL: 0-NO PAIN

## 2024-05-30 NOTE — PROGRESS NOTES
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  TP53 mutated.        a. Ist line therapy with carboplatin/Taxol/Keytruda X 4 cycles from Feb to May 2024.        b. F/U PET scan from May 2024 with excellent response.          c. S/p stereotactic radiation to the brain lesion in March 2024.        d.  Maintenance immunotherapy with Keytruda beginning May 2024    CHIEF COMPLAINTS:  Patient is in the clinic today for evaluation of right lung squamous cell carcinoma and for continuation of the therapy and management of therapy-related effects.    HISTORY:  Janee Maddox is a 74 y.o. female with right lung squamous cell carcinoma.  She is a lifelong smoker and was admitted at [facility] in Nov 2023 with complaints of shortness of breath, chest discomfort and positive bacteremia.  During the evaluation she was noted to have a large cavitary lesion in the right lung along with pleural effusion.  CT scan at that time showed a 6.1 x 5 cm right lower lobe cavitary lung mass along with loculated right-sided pleural effusion and prominent mediastinal and hilar lymphadenopathy.  Overall findings were concerning for either abscess or baseline malignancy.  She was transferred to [facility] where a CT-guided biopsy confirmed invasive squamous cell carcinoma.  PD-L1 expression was 5%.  Tumor NGS panel was mainly suggestive of TP53 mutation.  During hospitalization her clinical course was also complicated by Pantoea bacteremia and an ESBL Ecoli UTI.  She was treated with antibiotics and other supportive care.  A follow-up PET scan from Jan 2024 confirmed increased metabolic activity in the right lower lobe pleural-based cavitary mass along with mediastinal and right hilar lymphadenopathy and right-sided pleural disease  consistent with known malignancy.  There was no evidence of distant metastatic disease.  Brain MRI showed small solitary left parietal metastatic lesion and she is s/p stereotactic radiation treatment in 2024.  She was treated with first-line therapy with carbo/Taxol/Keytruda  X 4 cycles from Feb to May 2024.  Treatment course was complicated by recurrent UTI and other issues.  Posttreatment follow-up PET scan from May 2024 showed excellent response.  She is currently being treated with maintenance immunotherapy with Keytruda since May 2024.    INTERVAL HISTORY:  She has completed planned initial chemo plus immunotherapy recall.  Follow-up PET scan showing excellent response.  Clinically she is feeling better today.  Still has multiple other chronic symptomatology.    PAST MEDICAL HISTORY:  1.  Right lower lobe squamous cell lung cancer as detailed above.  2.  Coronary artery disease  3.  Hypertension  4.  Hyperlipidemia  5.  GERD  6.  Anxiety/depression  7.  History of C. difficile colitis  8.  E. coli UTI.  9.  History of complete hysterectomy, cholecystectomy and incisional hernia surgeries    SOCIAL HISTORY:  She lives alone in [place].  Quit smoking in 2023.  1 pack a day for 60 years prior smoking history.  Admit to occasional alcohol intake.  She work as a tax preparor.  Born and raised in [place].    FAMILY HISTORY:  Father  at age 86 from coronary artery disease. Mother also  at age 86 from multiple medical issues.  She had 1 sister and 4/2 siblings.  One of them  from myocardial infarction.  3 children, 7 grandkids and 1 great grand kid ~ all alive and well.  No other family history of bleeding, clotting or malignant disorders in the family history.    REVIEW OF SYSTEMS:   Pertinent finding as per the history above.  All other systems have been reviewed and generally negative and noncontributory.    VITAL SIGNS  BSA: 1.72 meters squared  /61   Pulse 94   Temp 36.6 °C  "(97.9 °F) (Temporal)   Resp 20   Ht (S) 1.529 m (5' 0.2\")   Wt 70 kg (154 lb 5.2 oz)   SpO2 97%   BMI 29.94 kg/m²     PHYSICAL EXAMINATION:  Detailed physical examination not done.    LAB DATA:  CBC from today shows a hemoglobin of 9.1 with MCV of 90.  White cell count is normal at 4.4 and platelets are 139K.  Metabolic profile is unremarkable other than creatinine of 1.3.    RADIOLOGICAL DATA:  Brain MRI 02/10/2024  Impression:  There is a 3 mm focus of enhancement within the posterior frontal lobe on the left worrisome for an intracranial metastatic lesion. There is no associated mass effect. No midline shift.  There is a 1 mm focus of increased signal within the right parietal lobe on the diffusion-weighted images which may represent a recent infarct versus artifact. No definite associated enhancement.  Volume loss and mild degree of nonspecific white matter signal compatible with microangiopathy. 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    PET scan 5/23/2024   Impression:  1.  Significantly decreased size and metabolic activity within a cavitary right lower lobe lung mass invading the right chest wall with residual moderate hypermetabolic activity compatible with residual viable disease. No new hypermetabolic pulmonary nodules.  2. Significant interval decrease in size and metabolic activity with mediastinal lymphadenopathy with residual metabolic activity concerning for residual viable disease. No new hypermetabolic  lymphadenopathy within the chest, abdomen, or pelvis.  3. Interval development of focal hypermetabolic activity at the right sternoclavicular joint favored to be degenerative in nature. No evidence of hypermetabolic osseous metastatic disease throughout the remainder of the body.     PATHOLOGY DATA:  Surgical Pathology Exam: [id number]   FINAL DIAGNOSIS   A. LUNG, RIGHT; BIOPSY:    -- INVASIVE SQUAMOUS CELL CARCINOMA, KERATINIZING. See comment   Electronically signed by Lino Carrillo MD on " 12/1/2023      PD-L1 22C3  (KEYTRUDA)Tumor Proportion Score (TPS): 5%   MICROSATELLITE STATUS: Microsatellite Instability-High (MSI-H) is NOT DETECTED.  DISEASE ASSOCIATED GENOMIC FINDINGS:   TP53 p.G245V ([id number] c.734G>T)  DISEASE RELEVANT ALTERATIONS NOT DETECTED:   Negative for ALK fusion.  Negative for BRAF V600E.  Negative for EGFR sensitizing mutation.  Negative for ERBB2 activating mutation  Negative for KRAS G12C.  Negative for MET exon 14 skipping mutation.  Negative for NTRK fusion.  Negative for RET fusion.  Negative for ROS1 fusion.    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  TP53 mutated.  Please refer  to the details of initial presentation and management as outlined above.  In summary she is a lifelong smoker and was noted to have a large cavitary right lower lung pleural-based lesion along with extensive mediastinal and hilar lymphadenopathy and loculated pleural effusion in November 2023.  CT-guided biopsy was confirmatory of invasive squamous cell carcinoma.  PD-L1 TPS score was 5%.  NGS panel was unremarkable other than finding of TP53 mutation.  Brain MRI showed a small 3 mm left frontal lobe lesion concerning for metastatic disease.  She received stereotactic brain radiation in March 2024 and was treated with first-line therapy with carbo/Taxol/Keytruda X 4 cycles from Feb to May 2024.  Treatment course was complicated by recurrent UTI and other issues.  Posttreatment follow-up PET scan from May 2024 showed excellent response.  She is currently being treated with maintenance immunotherapy with Keytruda since May 2024.    Again, long discussion with the patient and she is explained about the diagnosis, stage, prognosis and treatment/management option.   Despite multiple issues she was able to complete the 4 cycles of chemo plus Keytruda with follow-up scans showing good response.  We will transition her to maintenance immunotherapy with Keytruda at 6  weekly interval.  Probable benefit and side effect profile of mainly  weakness, fatigue, colitis, pneumonitis, endocrinopathies, transaminitis etc. were explained to them in detail.  Her overall prognosis remain guarded.    2.  Brain mets.  MRI results were suggestive of small solitary left parietal lobe lesion.  She is s/p stereotactic radiation therapy in Mar 2024.    3.  Recurrent UTI.  As stated above she again had a prolonged hospitalization at [facility] with E. coli UTI in April 2024.  As per  Dr. Schroeder (infectious disease) suggestion she is currently being treated with monthly IgG infusion.  Of note she was noted to have low IgG levels.      4.  Follow-up.  Follow-up with me in 6 weeks.  In the meantime she will come to the clinic for continuation of the treatment as per the protocol.      This note has been transcribed using Dragon voice recognition system and there is a possibility of unintentional typing misprints.

## 2024-06-03 ENCOUNTER — NUTRITION (OUTPATIENT)
Dept: RADIATION ONCOLOGY | Facility: CLINIC | Age: 74
End: 2024-06-03
Payer: MEDICARE

## 2024-06-03 NOTE — PROGRESS NOTES
NUTRITION Follow up NOTE    Nutrition Assessment     Reason for Visit:  Janee Maddox is a 74 y.o. 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 Treatment with carboplatin/taxol/keytruda.     Visit Type: Clinical Nutrition, Oncology, Telephone Visit:  A telephone visit (audio only) between the patient (at the distant site) and the provider (at the originating site) was utilized to provide this telehealth service.    Verbal Consent for Encounter: Verbal consent was requested and obtained from patient on this date for a telehealth visit.      RD reached out to patient via phone at mobile number listed. Daughter answered. Reports mother is doing better. Taste changes are improving. She is not taking appetite stimulant, but seems to be doing well without it (per daughters report). She has been drinking 1-2 Boost/Ensure Plus per day. Daughter remains concerned with overall fluid intake and chronic UTI.    Patient Active Problem List   Diagnosis    Abdominal aortic ectasia (CMS/HCC)    Abdominal pannus    Anxiety    Change in bowel habits    Chronic cough    Chronic cystitis    Cutaneous skin tags    Dependent edema    Depression, major, in remission (CMS/HCC)    Excess skin of abdomen    Genitourinary syndrome of menopause    GERD (gastroesophageal reflux disease)    Hypercholesteremia    Impaired fasting glucose    Nocturia    Numbness and tingling    Obesity    Osteoarthritis of wrist    Other insomnia    Rhinorrhea    Sensorineural hearing loss of both ears    Urge incontinence of urine    Urinary urgency    Vaginal itching    Class 2 obesity with body mass index (BMI) of 35.0 to 35.9 in adult    Long-term current use of benzodiazepine    ESBL (extended spectrum beta-lactamase) producing bacteria infection    Dysuria    Diverticulosis    Diverticulitis of intestine    COPD (chronic obstructive pulmonary disease) (CMS/HCC)    Collapse    Coronary atherosclerosis due to severely  "calcified coronary lesion    CAD (coronary artery disease)    C. difficile diarrhea    Agoraphobia    Abnormal CT scan, lung    Knee pain    Recurrent sinusitis    Panic attack    Otitis media    Open wound of anterior abdominal wall    Onychomycosis    Nicotine use disorder    Cigarette nicotine dependence with nicotine-induced disorder    Incisional hernia    ILD (interstitial lung disease) (CMS/HCC)    Generalized anxiety disorder with panic attacks    Anxiety and depression    Right knee pain    Right middle lobe pulmonary nodule    Tinnitus    Umbilical hernia    UTI (urinary tract infection)    Aortic ectasia, abdominal (CMS/HCC)    Diarrhea    Abscess    Obesity with body mass index 30 or greater    Obesity, Class I, BMI 30-34.9    Skin tag    Excess skin of abdominal wall    Gastroesophageal reflux disease    Pure hypercholesterolemia    Numbness and tingling sensation of skin    Insomnia    Sensorineural hearing loss, bilateral    Squamous cell carcinoma of lower lobe of right lung (CMS/HCC)       Lab Results   Component Value Date/Time    GLUCOSE 127 (H) 05/30/2024 1120     05/30/2024 1120    K 4.3 05/30/2024 1120     05/30/2024 1120    CO2 25 05/30/2024 1120    ANIONGAP 12 05/30/2024 1120    BUN 22 05/30/2024 1120    CREATININE 1.04 05/30/2024 1120    EGFR 56 (L) 05/30/2024 1120    CALCIUM 9.4 05/30/2024 1120    ALBUMIN 3.9 05/30/2024 1120    ALKPHOS 57 05/30/2024 1120    PROT 6.7 05/30/2024 1120    AST 14 05/30/2024 1120    BILITOT 0.3 05/30/2024 1120    ALT 11 05/30/2024 1120      Anthropometrics:  Anthropometrics  Height: 152.9 cm (5' 0.2\")  Weight: 71.6 kg (157 lb 13.6 oz)  BMI (Calculated): 30.63  Weight Change  Weight History / % Weight Change: slight decline in weights, not significant but should be monitored    Wt Readings from Last 20 Encounters:   05/30/24 70 kg (154 lb 5.2 oz)   05/14/24 71.6 kg (157 lb 13.6 oz)   05/06/24 71.6 kg (157 lb 13.6 oz)   05/03/24 72 kg (158 lb 11.7 oz) " "  04/19/24 71.6 kg (157 lb 13.6 oz)   04/15/24 73.3 kg (161 lb 9.6 oz)   04/12/24 73 kg (160 lb 15 oz)   04/04/24 73.6 kg (162 lb 4.1 oz)   04/03/24 72.1 kg (159 lb)   03/25/24 72.4 kg (159 lb 9.8 oz)   03/19/24 74.4 kg (164 lb)   03/11/24 73 kg (160 lb 15 oz)   03/05/24 74.7 kg (164 lb 9.6 oz)   02/26/24 73.2 kg (161 lb 6 oz)   02/21/24 73.2 kg (161 lb 6 oz)   02/19/24 73.6 kg (162 lb 4.1 oz)   02/16/24 74.8 kg (165 lb)   01/24/24 78.8 kg (173 lb 11.6 oz)   11/25/23 60.5 kg (133 lb 6.1 oz)   09/13/22 87.2 kg (192 lb 4 oz)     Food And Nutrient Intake (same as previous)  Food and Nutrient History  Food and Nutrient History: Patient and daughter report patient is historically a very picky eater.  Energy Intake: Fair 50-75 %  Fluid Intake: poor fluid drinker, but has been drinking more based on daughters reports, still not meeting needs  Food Allergy: NKFA  GI Symptoms: early satiety  GI Symptoms greater than 2 weeks: yes  Oral Problems: dysgeusia     Food Preparation  Cooking: Patient, Family  Grocery Shopping: Patient, Family    Medication and Complementary/Alternative Medicine Use  Prescription Medication Use: compazine, zofran, ativan  Vit/Minerals: Urinary and Bladder supplement (dandelion, D-mannose, cranberry), reports she is taking a vit B12/zinc supplement. Discussed taking a 30 day break from Zinc then adding back to see if this helps with taste changes (potential copper deficiency 2/2 supplementation)    Nutrition Focused Physical Exam Findings:  defer: phone visit    Energy Needs (continue as previous)  Calculated Energy Needs Using Equations  Height: 155 cm (5' 1.02\")  Based on ABW: 57.9kg    1448-1737kcals (25-30kcals/kg)  58-69g protein (1-1.2g/kg)  1448-1737mL fluid (25-30mL/kg)    Nutrition Diagnosis   Malnutrition Diagnosis  Patient has Malnutrition Diagnosis: No    Nutrition Diagnosis  Patient has Nutrition Diagnosis: Yes  Diagnosis Status (1): Ongoing  Related to (1): Increased nutrient needs  As " Evidenced by (1): catabolic disease and treatment  Additional Assessment Information (1): SCC of right lung, tx of pembro/paclitaxel/carboplatin    Nutrition Interventions/Recommendations   Nutrition Prescription  Individualized Nutrition Prescription Provided for : diet for cancer care    Food and Nutrition Delivery  Food and Nutrition Delivery  Meals & Snacks: Energy-modified diet, Protein-modified diet  Goals: Patient will focus on healthy/whole foods based diet, but ensure she is getting adequate protein and calories to meet nutrient needs.  Other:: Fluid  Goals: Patient will meet RD recommended fluid needs through fluids and high fluid foods.  Recommend increasing intake, multiple small meals per day. Also recommend Boost Plus 3x daily to assist in meeting nutrient needs.    Nutrition Education  Nutrition Education  Nutrition Education Content: Content related nutrition education  Goals: patient will understand the role diet plays in cancer care, recovery, and survivorship. Provided taste changes handout and discussed salt and soda mouth rinse    Coordination of Care  Coordination of Nutrition Care by a Nutrition Professional  Collaboration and referral of nutrition care: Collaboration by nutrition professional with other providers  Goals: RD will continue to work with oncology team to ensure best outcomes possible    Nutrition Monitoring and Evaluation   Food/Nutrient Related History Monitoring  Monitoring and Evaluation Plan: Energy intake, Protein intake, Meal/snack pattern  Energy Intake: Estimated energy intake  Criteria: patient will meet RD calculated caloric needs to support nutrition during treatment  Meal/Snack Pattern: Estimated meal and snack pattern  Criteria: patient will meet caloric needs by modifiying time and amount of food consumed (i.e. 5-6 small meals per day)  Estimated protein intake: Estimated protein intake  Criteria: patient will meet RD calculated protein needs via foods and adding  ONS if needed  Body Composition/Growth/Weight History  Monitoring and Evaluation Plan: Weight change  Weight Change: Weight change percentage  Criteria: patient will maintain weight throughout treatment  Biochemical Data, Medical Tests and Procedures  Monitoring and Evaluation Plan: Electrolyte/renal panel  Electrolyte and Renal Panel: BUN, Calcium, serum, Chloride, Creatinine, Magnesium, Phosphorus, Potassium, Sodium  Nutrition Focused Physical Findings  Monitoring and Evaluation Plan: Muscles  Muscles: Muscle atrophy  Criteria: patient will maintain muscle mass throughout treatment    Time Spent  Prep time on day of patient encounter: 10 minutes  Time spent directly with patient, family or caregiver: 20 minutes  Documentation Time: 10 minutes    Readiness to Change : Poor  Level of Understanding : Fair  Anticipated Compliant : Poor

## 2024-06-06 ENCOUNTER — INFUSION (OUTPATIENT)
Dept: HEMATOLOGY/ONCOLOGY | Facility: CLINIC | Age: 74
End: 2024-06-06
Payer: MEDICARE

## 2024-06-06 VITALS
OXYGEN SATURATION: 96 % | SYSTOLIC BLOOD PRESSURE: 120 MMHG | TEMPERATURE: 97.5 F | DIASTOLIC BLOOD PRESSURE: 61 MMHG | RESPIRATION RATE: 18 BRPM | HEART RATE: 92 BPM

## 2024-06-06 DIAGNOSIS — C79.31 MALIGNANT NEOPLASM METASTATIC TO BRAIN (MULTI): ICD-10-CM

## 2024-06-06 DIAGNOSIS — C34.31 SQUAMOUS CELL CARCINOMA OF LOWER LOBE OF RIGHT LUNG (MULTI): ICD-10-CM

## 2024-06-06 PROCEDURE — 96360 HYDRATION IV INFUSION INIT: CPT | Mod: INF

## 2024-06-06 PROCEDURE — 2500000004 HC RX 250 GENERAL PHARMACY W/ HCPCS (ALT 636 FOR OP/ED): Performed by: INTERNAL MEDICINE

## 2024-06-06 PROCEDURE — 96361 HYDRATE IV INFUSION ADD-ON: CPT | Mod: INF

## 2024-06-06 RX ORDER — HEPARIN 100 UNIT/ML
500 SYRINGE INTRAVENOUS AS NEEDED
Status: DISCONTINUED | OUTPATIENT
Start: 2024-06-06 | End: 2024-06-06 | Stop reason: HOSPADM

## 2024-06-06 RX ORDER — SODIUM CHLORIDE 9 MG/ML
500 INJECTION, SOLUTION INTRAVENOUS CONTINUOUS
Status: DISCONTINUED | OUTPATIENT
Start: 2024-06-06 | End: 2024-06-06 | Stop reason: HOSPADM

## 2024-06-06 RX ORDER — HEPARIN SODIUM,PORCINE/PF 10 UNIT/ML
50 SYRINGE (ML) INTRAVENOUS AS NEEDED
Status: CANCELLED | OUTPATIENT
Start: 2024-06-06

## 2024-06-06 RX ORDER — HEPARIN 100 UNIT/ML
500 SYRINGE INTRAVENOUS AS NEEDED
Status: CANCELLED | OUTPATIENT
Start: 2024-06-06

## 2024-06-06 RX ORDER — DIPHENHYDRAMINE HYDROCHLORIDE 50 MG/ML
50 INJECTION INTRAMUSCULAR; INTRAVENOUS AS NEEDED
OUTPATIENT
Start: 2024-06-06

## 2024-06-06 RX ORDER — SODIUM CHLORIDE 9 MG/ML
500 INJECTION, SOLUTION INTRAVENOUS CONTINUOUS
OUTPATIENT
Start: 2024-06-06

## 2024-06-06 RX ORDER — EPINEPHRINE 0.3 MG/.3ML
0.3 INJECTION SUBCUTANEOUS EVERY 5 MIN PRN
OUTPATIENT
Start: 2024-06-06

## 2024-06-06 RX ORDER — ALBUTEROL SULFATE 0.83 MG/ML
3 SOLUTION RESPIRATORY (INHALATION) AS NEEDED
OUTPATIENT
Start: 2024-06-06

## 2024-06-06 RX ORDER — FAMOTIDINE 10 MG/ML
20 INJECTION INTRAVENOUS ONCE AS NEEDED
OUTPATIENT
Start: 2024-06-06

## 2024-06-06 RX ADMIN — SODIUM CHLORIDE 500 ML/HR: 9 INJECTION, SOLUTION INTRAVENOUS at 11:34

## 2024-06-06 RX ADMIN — HEPARIN 500 UNITS: 100 SYRINGE at 13:41

## 2024-06-06 ASSESSMENT — PAIN SCALES - GENERAL: PAINLEVEL: 0-NO PAIN

## 2024-06-11 ENCOUNTER — INFUSION (OUTPATIENT)
Dept: HEMATOLOGY/ONCOLOGY | Facility: CLINIC | Age: 74
End: 2024-06-11
Payer: MEDICARE

## 2024-06-11 VITALS
DIASTOLIC BLOOD PRESSURE: 74 MMHG | BODY MASS INDEX: 29.95 KG/M2 | HEART RATE: 81 BPM | WEIGHT: 152.56 LBS | OXYGEN SATURATION: 98 % | HEIGHT: 60 IN | RESPIRATION RATE: 18 BRPM | TEMPERATURE: 97.7 F | SYSTOLIC BLOOD PRESSURE: 125 MMHG

## 2024-06-11 DIAGNOSIS — C34.31 SQUAMOUS CELL CARCINOMA OF LOWER LOBE OF RIGHT LUNG (MULTI): ICD-10-CM

## 2024-06-11 DIAGNOSIS — D80.3 IGG DEFICIENCY (MULTI): ICD-10-CM

## 2024-06-11 PROCEDURE — 96366 THER/PROPH/DIAG IV INF ADDON: CPT | Mod: INF

## 2024-06-11 PROCEDURE — 2500000004 HC RX 250 GENERAL PHARMACY W/ HCPCS (ALT 636 FOR OP/ED): Performed by: INTERNAL MEDICINE

## 2024-06-11 PROCEDURE — 96365 THER/PROPH/DIAG IV INF INIT: CPT | Mod: INF

## 2024-06-11 PROCEDURE — 2500000001 HC RX 250 WO HCPCS SELF ADMINISTERED DRUGS (ALT 637 FOR MEDICARE OP): Performed by: INTERNAL MEDICINE

## 2024-06-11 RX ORDER — HEPARIN 100 UNIT/ML
500 SYRINGE INTRAVENOUS AS NEEDED
OUTPATIENT
Start: 2024-06-11

## 2024-06-11 RX ORDER — ALBUTEROL SULFATE 0.83 MG/ML
3 SOLUTION RESPIRATORY (INHALATION) AS NEEDED
OUTPATIENT
Start: 2024-07-09

## 2024-06-11 RX ORDER — HEPARIN 100 UNIT/ML
500 SYRINGE INTRAVENOUS AS NEEDED
Status: DISCONTINUED | OUTPATIENT
Start: 2024-06-11 | End: 2024-06-11 | Stop reason: HOSPADM

## 2024-06-11 RX ORDER — DIPHENHYDRAMINE HCL 25 MG
25 CAPSULE ORAL ONCE
OUTPATIENT
Start: 2024-07-09

## 2024-06-11 RX ORDER — FAMOTIDINE 10 MG/ML
20 INJECTION INTRAVENOUS ONCE AS NEEDED
OUTPATIENT
Start: 2024-07-09

## 2024-06-11 RX ORDER — ACETAMINOPHEN 325 MG/1
650 TABLET ORAL ONCE
OUTPATIENT
Start: 2024-07-09

## 2024-06-11 RX ORDER — HEPARIN SODIUM,PORCINE/PF 10 UNIT/ML
50 SYRINGE (ML) INTRAVENOUS AS NEEDED
OUTPATIENT
Start: 2024-06-11

## 2024-06-11 RX ORDER — DIPHENHYDRAMINE HCL 25 MG
25 CAPSULE ORAL ONCE
Status: COMPLETED | OUTPATIENT
Start: 2024-06-11 | End: 2024-06-11

## 2024-06-11 RX ORDER — DIPHENHYDRAMINE HYDROCHLORIDE 50 MG/ML
50 INJECTION INTRAMUSCULAR; INTRAVENOUS AS NEEDED
OUTPATIENT
Start: 2024-07-09

## 2024-06-11 RX ORDER — EPINEPHRINE 0.3 MG/.3ML
0.3 INJECTION SUBCUTANEOUS EVERY 5 MIN PRN
OUTPATIENT
Start: 2024-07-09

## 2024-06-11 RX ORDER — ACETAMINOPHEN 325 MG/1
650 TABLET ORAL ONCE
Status: COMPLETED | OUTPATIENT
Start: 2024-06-11 | End: 2024-06-11

## 2024-06-11 RX ADMIN — IMMUNE GLOBULIN INFUSION (HUMAN) 10 G: 100 INJECTION, SOLUTION INTRAVENOUS; SUBCUTANEOUS at 09:06

## 2024-06-11 RX ADMIN — DIPHENHYDRAMINE HYDROCHLORIDE 25 MG: 25 CAPSULE ORAL at 08:55

## 2024-06-11 RX ADMIN — ACETAMINOPHEN 650 MG: 325 TABLET ORAL at 08:55

## 2024-06-11 RX ADMIN — HEPARIN 500 UNITS: 100 SYRINGE at 10:53

## 2024-06-11 ASSESSMENT — PAIN SCALES - GENERAL: PAINLEVEL: 0-NO PAIN

## 2024-06-13 ENCOUNTER — APPOINTMENT (OUTPATIENT)
Dept: BEHAVIORAL HEALTH | Facility: CLINIC | Age: 74
End: 2024-06-13
Payer: MEDICARE

## 2024-06-13 DIAGNOSIS — F41.1 GAD (GENERALIZED ANXIETY DISORDER): ICD-10-CM

## 2024-06-13 DIAGNOSIS — C34.31 SQUAMOUS CELL CARCINOMA OF LOWER LOBE OF RIGHT LUNG (MULTI): ICD-10-CM

## 2024-06-13 PROCEDURE — 1160F RVW MEDS BY RX/DR IN RCRD: CPT | Performed by: CLINICAL NURSE SPECIALIST

## 2024-06-13 PROCEDURE — 99212 OFFICE O/P EST SF 10 MIN: CPT | Performed by: CLINICAL NURSE SPECIALIST

## 2024-06-13 PROCEDURE — 1159F MED LIST DOCD IN RCRD: CPT | Performed by: CLINICAL NURSE SPECIALIST

## 2024-06-13 RX ORDER — BUPROPION HYDROCHLORIDE 300 MG/1
300 TABLET ORAL EVERY MORNING
Qty: 90 TABLET | Refills: 1 | Status: SHIPPED | OUTPATIENT
Start: 2024-06-13 | End: 2024-09-11

## 2024-06-13 RX ORDER — LORAZEPAM 1 MG/1
TABLET ORAL
Qty: 45 TABLET | Refills: 2 | Status: SHIPPED | OUTPATIENT
Start: 2024-06-13

## 2024-06-13 NOTE — PROGRESS NOTES
Outpatient Psychiatry      Subjective   Janee Maddox, a 74 y.o. female presents as an established patient for an appointment with outpatient psychiatry for follow up/medication management . This is a virtual appointment      Diagnosis:   Generalized anxiety disorder ( stable )   Depression major in remission  ( stable )   Other insomnia ( stable )     Treatment Plan/Recommendations:   Follow-up plan was discussed with patient.    can follow up for medications in 10-12  weeks. can continue self care and wellness efforts and maintain other appointments with other medical providers.   Review with patient: Treatment plan reviewed with the patient.  Medication risks/benefit reviewed with the patient    HPI:Patient has been seeing oncology and is having chemotherapy , for squamous cell carcinoma  Reviewed notes in the [facility] per oncology   Reviewed labs in the [facility]   She has continued to work some, works as an  part time   Her family helps organize her medications   Denies having confusion   No issues with substance abuse   Anxiety is manageableTakes precautions to prevent falls     I have personally reviewed the OARRS report for JANEE MADDOX. I have considered the risks of abuse, dependence, addiction and diversion.   I have the following concerns: no concerns on oarrs. No concerns , however will reduce lorazepam dose , sent message to oncology through my chart for care coordination , patient has a falls risk , reviewed potential side effects and med instructions reviewed   Is the patient prescribed a combination of a benzodiazepine and opioid? No.   Last urine drug screening date/ordered  uds ordered 6/12/23 results as expected   Date of the last Controlled Substance Agreement: signed paper copy , csa in person appointment on 10/24/23  BENZODIAZEPINES   What is the patient's goal of therapy? to manage pepe and support daily functioning.  Is this being achieved with current treatment? yes , she  can attend to daily activities without interruption of intense anxiety.     Current Outpatient Medications:     albuterol (Ventolin HFA) 90 mcg/actuation inhaler, Inhale 2 puffs if needed for wheezing or shortness of breath (every 4 to 6 hours as needed)., Disp: 18 g, Rfl: 2    ALBUTEROL INHL, 2 puffs, Inhalation, [id number], PRN PRN for wheezing, # 18 g, Refill(s) 2, Date: 10/12/2023 14:11:00 EDT, Pharmacy: [facility] #[id number], 2 puffs Inhalation [id number],PRN:for wheezing, 154.94, 09/26/2023 19:59:00 EDT, Height/Length Dosing, cm, 86.8, 07/30/2023..., Disp: , Rfl:     buPROPion XL (Wellbutrin XL) 300 mg 24 hr tablet, Take 1 tablet (300 mg) by mouth once daily in the morning. Do not crush, chew, or split., Disp: 90 tablet, Rfl: 1    estradiol (Estrace) 0.01 % (0.1 mg/gram) vaginal cream, Apply daily 1 gram (large pea) for 3 weeks, then 3 times per week., Disp: 42.5 g, Rfl: 5    fosfomycin (Monurol) 3 gram packet, 3 gram every 72 hrs up to 3 doses total (Patient taking differently: 3 gram every 72 hrs up to 3 doses total  for uti), Disp: 9 g, Rfl: 0    LORazepam (Ativan) 1 mg tablet, 1 tablet each morning and 1/2 tablet each evening ., Disp: 45 tablet, Rfl: 2    omeprazole (PriLOSEC) 20 mg DR capsule, Take 1 capsule (20 mg) by mouth once daily in the morning. Take before meals., Disp: , Rfl:     ondansetron (Zofran) 8 mg tablet, Take 1 tablet (8 mg) by mouth every 8 hours if needed for nausea or vomiting., Disp: 30 tablet, Rfl: 2    peppermint oiL liquid, Apply topically., Disp: , Rfl:     prochlorperazine (Compazine) 10 mg tablet, Take 1 tablet (10 mg) by mouth every 6 hours if needed for nausea or vomiting., Disp: 30 tablet, Rfl: 2    psyllium seed, with sugar, (METAMUCIL, SUGAR, ORAL), ORAL, Refill(s) 0, Date: 07/08/2021 15:08:00 EDT, Disp: , Rfl:     solifenacin (VESIcare) 5 mg tablet, Take 1 tablet (5 mg) by mouth once daily. Swallow tablet whole; do not crush, chew, or split., Disp: 90 tablet, Rfl: 3  No current  facility-administered medications for this visit.  Medical History:  Past Medical History:   Diagnosis Date    Agoraphobia, unspecified 12/06/2016    Agoraphobia    Body mass index (BMI) 33.0-33.9, adult     BMI 33.0-33.9,adult    Chronic sinusitis, unspecified     Recurrent sinus infections    Coronary artery disease     Cutaneous abscess, unspecified 06/01/2016    Abscess    Disruption of external operation (surgical) wound, not elsewhere classified, initial encounter 07/31/2017    Open abdominal incision with drainage    Diverticulitis of intestine, part unspecified, without perforation or abscess without bleeding 08/13/2018    Acute diverticulitis    Encounter for immunization 04/21/2021    Encounter for immunization    Encounter for screening for lipoid disorders 04/26/2016    Screening cholesterol level    Hypercholesteremia     Hypertension     IgG deficiency (Multi) 05/03/2024    Incisional hernia without obstruction or gangrene 09/11/2015    Incisional hernia    Infection following a procedure, other surgical site, initial encounter 04/20/2017    Incisional infection    Local infection of the skin and subcutaneous tissue, unspecified 05/01/2019    Skin infection    Other conditions influencing health status     Complete Colonoscopy    Other specified postprocedural states 05/15/2018    History of incision and drainage    Otitis media, unspecified, left ear 05/19/2014    Otitis media, left    Pain in right knee 02/16/2015    Bilateral knee pain    Personal history of other diseases of the circulatory system     History of hypertension    Personal history of other infectious and parasitic diseases 07/03/2018    History of onychomycosis    Personal history of other infectious and parasitic diseases 10/18/2019    History of Clostridioides difficile colitis    Personal history of other specified conditions 04/20/2017    History of dysuria    Personal history of other specified conditions 02/02/2017    History of  nocturia    Personal history of other specified conditions 10/18/2019    History of diarrhea    Personal history of other specified conditions 12/06/2019    History of dysuria    Personal history of other specified conditions 02/16/2015    History of dyspnea    Personal history of urinary (tract) infections 12/11/2019    History of urinary tract infection    Squamous cell carcinoma of lower lobe of right lung (Multi) 01/24/2024    Syncope and collapse 01/05/2018    Collapse    Tinnitus, left ear 08/29/2014    Tinnitus of left ear    Unspecified open wound of abdominal wall, unspecified quadrant without penetration into peritoneal cavity, initial encounter 08/23/2019    Wound, open, abdominal wall, anterior     Surgical History:  Past Surgical History:   Procedure Laterality Date    CARPAL TUNNEL RELEASE  05/20/2014    Neuroplasty Decompression Median Nerve At Carpal Tunnel    CHOLECYSTECTOMY  05/20/2014    Cholecystectomy    COLONOSCOPY  01/08/2021    Complete Colonoscopy    CT GUIDED ASPIRATION OF ABSCESS, HEMATOMA, CYST  11/27/2023    CT GUIDED ASPIRATION OF ABSCESS, HEMATOMA, CYST 11/27/2023 PAR CT    CT GUIDED PERCUTANEOUS BIOPSY LUNG  11/27/2023    CT GUIDED PERCUTANEOUS BIOPSY LUNG 11/27/2023 PAR CT    HYSTERECTOMY  05/20/2014    Hysterectomy    KNEE ARTHROSCOPY W/ DEBRIDEMENT  02/16/2015    Knee Arthroscopy (Therapeutic)    OTHER SURGICAL HISTORY  08/20/2019    Incisional Hernia Repair    RECTAL VAGINAL FISTULECTOMY  05/20/2014    Rectocele Repair     Record Review: brief     Vitals:  There were no vitals filed for this visit.    Maday Wu, APRN-CNS

## 2024-06-19 ENCOUNTER — TELEPHONE (OUTPATIENT)
Dept: RADIATION ONCOLOGY | Facility: HOSPITAL | Age: 74
End: 2024-06-19
Payer: MEDICARE

## 2024-06-19 NOTE — TELEPHONE ENCOUNTER
Called pt to remind of appointment on 6/20/24  at 11:15 Pt's phone went to voicemail left number if needs to reschedule.      Mundo Maxwell MA

## 2024-06-20 ENCOUNTER — HOSPITAL ENCOUNTER (OUTPATIENT)
Dept: RADIOLOGY | Facility: HOSPITAL | Age: 74
Discharge: HOME | End: 2024-06-20
Payer: MEDICARE

## 2024-06-20 ENCOUNTER — HOSPITAL ENCOUNTER (OUTPATIENT)
Dept: RADIATION ONCOLOGY | Facility: HOSPITAL | Age: 74
Setting detail: RADIATION/ONCOLOGY SERIES
Discharge: HOME | End: 2024-06-20
Payer: MEDICARE

## 2024-06-20 VITALS
TEMPERATURE: 97.3 F | WEIGHT: 153.2 LBS | RESPIRATION RATE: 18 BRPM | DIASTOLIC BLOOD PRESSURE: 70 MMHG | HEART RATE: 92 BPM | OXYGEN SATURATION: 94 % | HEIGHT: 61 IN | SYSTOLIC BLOOD PRESSURE: 106 MMHG | BODY MASS INDEX: 28.92 KG/M2

## 2024-06-20 DIAGNOSIS — C79.31 MALIGNANT NEOPLASM METASTATIC TO BRAIN (MULTI): ICD-10-CM

## 2024-06-20 DIAGNOSIS — C34.31 SQUAMOUS CELL CARCINOMA OF LOWER LOBE OF RIGHT LUNG (MULTI): Primary | ICD-10-CM

## 2024-06-20 PROCEDURE — 70553 MRI BRAIN STEM W/O & W/DYE: CPT

## 2024-06-20 PROCEDURE — 70553 MRI BRAIN STEM W/O & W/DYE: CPT | Performed by: RADIOLOGY

## 2024-06-20 PROCEDURE — 99214 OFFICE O/P EST MOD 30 MIN: CPT | Performed by: NURSE PRACTITIONER

## 2024-06-20 PROCEDURE — A9575 INJ GADOTERATE MEGLUMI 0.1ML: HCPCS | Performed by: STUDENT IN AN ORGANIZED HEALTH CARE EDUCATION/TRAINING PROGRAM

## 2024-06-20 PROCEDURE — 2550000001 HC RX 255 CONTRASTS: Performed by: STUDENT IN AN ORGANIZED HEALTH CARE EDUCATION/TRAINING PROGRAM

## 2024-06-20 RX ORDER — GADOTERATE MEGLUMINE 376.9 MG/ML
14 INJECTION INTRAVENOUS
Status: COMPLETED | OUTPATIENT
Start: 2024-06-20 | End: 2024-06-20

## 2024-06-20 ASSESSMENT — PATIENT HEALTH QUESTIONNAIRE - PHQ9
1. LITTLE INTEREST OR PLEASURE IN DOING THINGS: NOT AT ALL
SUM OF ALL RESPONSES TO PHQ9 QUESTIONS 1 AND 2: 0
2. FEELING DOWN, DEPRESSED OR HOPELESS: NOT AT ALL

## 2024-06-20 ASSESSMENT — ENCOUNTER SYMPTOMS
OCCASIONAL FEELINGS OF UNSTEADINESS: 0
DEPRESSION: 0
LOSS OF SENSATION IN FEET: 0

## 2024-06-20 ASSESSMENT — PAIN SCALES - GENERAL: PAINLEVEL: 0-NO PAIN

## 2024-06-20 NOTE — PROGRESS NOTES
Radiation Oncology Follow-Up    Patient Name:  Janee Maddox  MRN:  [id number]  :  1950    Referring Provider: Dameon Albert MD, *  Primary Care Provider: Frances Cervantes MD  Care Team: Patient Care Team:  Frances Cervantes MD as PCP - General (Internal Medicine)  Frances Cervantes MD as PCP - [employer] PCP  Prasanth Gonsales MD as Consulting Physician (Hematology and Oncology)  Shalom Wen MD as Consulting Physician (Hematology and Oncology)  Kenia Jackson RN as Registered Nurse (Hematology and Oncology)  HATTIE Kang-CNS as Nurse Practitioner (Geriatric Psychiatry)    Date of Service: 2024     HEMATOLOGY ONCOLOGY PROBLEMS:  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  TP53 mutated.        a. Ist line therapy with carboplatin/Taxol/Keytruda X 4 cycles from Feb to May 2024.        b. F/U PET scan from May 2024 with excellent response.          c. S/p stereotactic radiation to the brain lesion in 2024.        d.  Maintenance immunotherapy with Keytruda beginning May 2024    HISTORY:    74 y.o. female with right lung squamous cell carcinoma.  She is a lifelong smoker and was admitted at [facility] in 2023 with complaints of shortness of breath, chest discomfort and positive bacteremia.  During the evaluation she was noted to have a large cavitary lesion in the right lung along with pleural effusion.  CT scan at that time showed a 6.1 x 5 cm right lower lobe cavitary lung mass along with loculated right-sided pleural effusion and prominent mediastinal and hilar lymphadenopathy.  Overall findings were concerning for either abscess or baseline malignancy.      She was transferred to [facility] where a CT-guided biopsy confirmed invasive squamous cell carcinoma.  PD-L1 expression was 5%.  Tumor NGS panel was mainly suggestive of TP53 mutation.  During hospitalization her clinical course was also complicated by Pantoea bacteremia  "and an ESBL Ecoli UTI.  She was treated with antibiotics and other supportive care.      A follow-up PET scan from Jan 2024 confirmed increased metabolic activity in the right lower lobe pleural-based cavitary mass along with mediastinal and right hilar lymphadenopathy and right-sided pleural disease consistent with known malignancy.  There was no evidence of distant metastatic disease.      Brain MRI showed small solitary left parietal metastatic lesion and she is s/p stereotactic radiation treatment in March 2024 to 3 lesions.     She was treated with first-line therapy with carbo/Taxol/Keytruda  X 4 cycles from Feb to May 2024.  Treatment course was complicated by recurrent UTI and other issues.  Posttreatment follow-up PET scan from May 2024 showed excellent response.  She is currently being treated with maintenance immunotherapy with Keytruda since May 2024.     SUBJECTIVE  History of Present Illness:   Janee Maddox is here with her friend Beata today for routine radiation follow up and review of MRI of brain done this morning. Doing well and now on maintenance Keytruda. Tolerating it well.  Her only complaint today is chronic UTIs and is seeing infectious disease at [place] for this. She endorses urinary frequency and nocturia approx every hr. Denies headaches, seizures, visual changes or any neurologic deficits. No fever, chills, cough, SOB, chest pain, n/v/c/d or bony pain. She continues to abstain from tobacco since November 2023.      Review of Systems:    Review of Systems   All other systems reviewed and are negative.    Performance Status:   The Karnofsky performance scale today is 90, Able to carry on normal activity; minor signs or symptoms of disease (ECOG equivalent 0).      OBJECTIVE  Vital Signs:  /70   Pulse 92   Temp 36.3 °C (97.3 °F) (Temporal)   Resp 18   Ht 1.549 m (5' 1\")   Wt 69.5 kg (153 lb 3.2 oz)   SpO2 94%   BMI 28.95 kg/m²      Current Outpatient Medications:     " albuterol (Ventolin HFA) 90 mcg/actuation inhaler, Inhale 2 puffs if needed for wheezing or shortness of breath (every 4 to 6 hours as needed)., Disp: 18 g, Rfl: 2    ALBUTEROL INHL, 2 puffs, Inhalation, [id number], PRN PRN for wheezing, # 18 g, Refill(s) 2, Date: 10/12/2023 14:11:00 EDT, Pharmacy: [id number], 2 puffs Inhalation [id number],PRN:for wheezing, 154.94, 09/26/2023 19:59:00 EDT, Height/Length Dosing, cm, 86.8, 07/30/2023..., Disp: , Rfl:     buPROPion XL (Wellbutrin XL) 300 mg 24 hr tablet, Take 1 tablet (300 mg) by mouth once daily in the morning. Do not crush, chew, or split., Disp: 90 tablet, Rfl: 1    estradiol (Estrace) 0.01 % (0.1 mg/gram) vaginal cream, Apply daily 1 gram (large pea) for 3 weeks, then 3 times per week., Disp: 42.5 g, Rfl: 5    fosfomycin (Monurol) 3 gram packet, 3 gram every 72 hrs up to 3 doses total (Patient taking differently: 3 gram every 72 hrs up to 3 doses total  for uti), Disp: 9 g, Rfl: 0    LORazepam (Ativan) 1 mg tablet, 1 tablet each morning and 1/2 tablet each evening ., Disp: 45 tablet, Rfl: 2    omeprazole (PriLOSEC) 20 mg DR capsule, Take 1 capsule (20 mg) by mouth once daily in the morning. Take before meals., Disp: , Rfl:     ondansetron (Zofran) 8 mg tablet, Take 1 tablet (8 mg) by mouth every 8 hours if needed for nausea or vomiting., Disp: 30 tablet, Rfl: 2    peppermint oiL liquid, Apply topically., Disp: , Rfl:     prochlorperazine (Compazine) 10 mg tablet, Take 1 tablet (10 mg) by mouth every 6 hours if needed for nausea or vomiting., Disp: 30 tablet, Rfl: 2    psyllium seed, with sugar, (METAMUCIL, SUGAR, ORAL), ORAL, Refill(s) 0, Date: 07/08/2021 15:08:00 EDT, Disp: , Rfl:     solifenacin (VESIcare) 5 mg tablet, Take 1 tablet (5 mg) by mouth once daily. Swallow tablet whole; do not crush, chew, or split., Disp: 90 tablet, Rfl: 3  No current facility-administered medications for this encounter.     Physical Exam  Vitals reviewed.   Constitutional:        Appearance: Normal appearance. She is normal weight.   HENT:      Head: Normocephalic and atraumatic.      Nose: Nose normal.      Mouth/Throat:      Mouth: Mucous membranes are moist.      Pharynx: Oropharynx is clear.   Eyes:      Conjunctiva/sclera: Conjunctivae normal.      Pupils: Pupils are equal, round, and reactive to light.   Cardiovascular:      Rate and Rhythm: Normal rate and regular rhythm.      Heart sounds: Normal heart sounds.   Pulmonary:      Effort: Pulmonary effort is normal.      Breath sounds: Normal breath sounds.   Abdominal:      Palpations: Abdomen is soft.   Musculoskeletal:         General: No swelling. Normal range of motion.      Cervical back: Normal range of motion and neck supple.   Lymphadenopathy:      Cervical: No cervical adenopathy.   Skin:     General: Skin is warm and dry.   Neurological:      General: No focal deficit present.      Mental Status: She is alert and oriented to person, place, and time.      Cranial Nerves: No cranial nerve deficit.      Sensory: No sensory deficit.      Motor: No weakness.      Coordination: Coordination normal.      Gait: Gait normal.   Psychiatric:         Mood and Affect: Mood normal.         Behavior: Behavior normal.         RESULTS:  NM PET CT lung CA staging    Result Date: 5/23/2024  Interpreted By:  Fish Maria and Kelly Rory STUDY: NM PET CT LUNG CA STAGING; 5/23/2024 9:20 am   INDICATION: Signs/Symptoms:Metastatic right lung squamous cell carcinoma.  On chemoimmunotherapy.  Follow-up study..   COMPARISON: PET-CT dated 05/23/2024   ACCESSION NUMBER(S): [id number]   ORDERING CLINICIAN: IRWIN COBB   TECHNIQUE: DIVISION OF NUCLEAR MEDICINE POSITRON EMISSION TOMOGRAPHY (PET-CT)   The patient received an intravenous dose of 12.57 mCi of Fluorine-18 fluorodeoxyglucose (FDG). Positron emission tomographic (PET) images from mid-thigh to skull base were then acquired after a one hour delay. Also acquired was a contemporaneous low  dose non-contrast CT scan performed for attenuation correction of PET images and anatomic localization.  The PET and CT images were digitally fused for display.  All images were acquired on a combined PET-CT scanner unit. Some areas of FDG accumulation may be described in standardized uptake value (SUV) units.   CODING: Subsequent Treatment Strategy (PS)   CALIBRATION: Dose Injection-to-Scan Interval (mins): 56 min Mediastinal bloodpool SUV (normal 1.5-2.5): 2.5 Blood glucose: 114 mg/dL   FINDINGS: NECK: There is prominent muscular activity within the neck musculature. No focal hypermetabolic soft tissue lesion is seen in the neck. No hypermetabolic cervical lymphadenopathy is present.   CHEST: Significant interval reduction in size and metabolic activity within a centrally cavitary right lower lobe lung mass compatible with patient's known squamous cell carcinoma. There is residual peripheral activity within the mass measuring up to 3.8 SUV with invasion into the right 7th through 10th ribs and T9 and T10 vertebral bodies. No new hypermetabolic pulmonary nodules. Significant interval reduction in size and metabolic activity within multiple mediastinal lymph nodes with mild hypermetabolic activity within right paratracheal lymph nodes measuring 2.3, previously measuring up to 3.6, and mild hypermetabolic activity within right perihilar lymph nodes with maximum SUV 3.1, previously measuring up to 4.4. Additionally, there is been interval resolution of hypermetabolic activity within multiple subcarinal lymph nodes. No new hypermetabolic mediastinal lymphadenopathy.   ABDOMEN AND PELVIS: No hypermetabolic soft tissue lesion is present in the abdomen and pelvis. No evidence of hypermetabolic lymphadenopathy. Physiologic radiotracer uptake is present in the liver and spleen with excretion into the bowel loops and the genitourinary tract. Similar aneurysmal dilation of the infrarenal abdominal aorta measuring up to 3.4  cm.   MUSCULOSKELETAL: Interval development moderate hypermetabolic activity associated with the right sternoclavicular joint with maximum SUV of 3.7 with associated severe degenerative changes. No additional focal hypermetabolic osseous lesions to suggest osseous metastatic disease.       1.  Significantly decreased size and metabolic activity within a cavitary right lower lobe lung mass invading the right chest wall with residual moderate hypermetabolic activity compatible with residual viable disease. No new hypermetabolic pulmonary nodules. 2. Significant interval decrease in size and metabolic activity with mediastinal lymphadenopathy with residual metabolic activity concerning for residual viable disease. No new hypermetabolic lymphadenopathy within the chest, abdomen, or pelvis. 3. Interval development of focal hypermetabolic activity at the right sternoclavicular joint favored to be degenerative in nature. No evidence of hypermetabolic osseous metastatic disease throughout the remainder of the body.       I personally reviewed the image(s) / study and agree with the findings and interpretation as stated. This study was interpreted at [facility].   Signed by: Fish Maria 5/23/2024 12:40 PM Dictation workstation:   [id number]  MR brain w and wo IV contrast    Result Date: 6/20/2024  Interpreted By:  Yash Greer and Ravi Shweta STUDY: MR BRAIN W AND WO IV CONTRAST;  6/20/2024 8:37 am   INDICATION: Signs/Symptoms:brain mets - s/p gamma knife.   COMPARISON: MRI brain 03/19/2024, 02/10/2024   ACCESSION NUMBER(S): [id number]   ORDERING CLINICIAN: GILBERT SCHMIDT   TECHNIQUE: Axial T2, FLAIR, DWI, gradient echo T2 and T1 weighted images of brain were acquired. Post contrast T1 weighted images were acquired after administration of 14 ML Dotarem gadolinium based intravenous contrast. Multiplanar reconstructions were obtained of volumetric T1 images.   FINDINGS: Previously  noted punctate enhancement involving the left precentral gyrus has resolved on current examination. Similarly focal enhancement within the right occipital lobe has also resolved on current exam. Persistent focal linear enhancement involving the left posterior parietal lobe (series 9, image 130) is again nonspecific and may represent a vessel. No new enhancing lesions are evident.   No focal diffusion restriction abnormality is evident to suggest acute infarct. Unchanged encephalomalacia/gliosis is noted within the right cerebellum favoring prior infarct. Previously noted focal diffusion restriction abnormality within the right parietal lobe (02/10/2024) is no longer present and no additional signal abnormality is noted in region.   Punctate T2 hyperintense, T1 hypointense and FLAIR hypointense focus within the left lentiform nucleus may represent an enlarged perivascular space or lacunar infarct. Additional punctate T2 hyperintense and FLAIR hypointense foci are present within the basal ganglia favoring perivascular spaces.   Minimal periventricular and subcortical white matter FLAIR and T2 hyperintensities are present, which may be associated with chronic small vessel ischemic disease.   The ventricles, sulci, and basal cisterns are within normal limits for parenchymal volume loss which is mild. No extra-axial collection is evident.   No mass effect or midline shift. No focal susceptibility artifact on gradient echo.   Visualized paranasal sinuses and mastoid air cells are unremarkable.   Left lens replacement.       1. Status post gamma knife therapy with resolution of right occipital lobe and left precentral gyrus enhancing foci. 2. Nonspecific enhancement involving the left posterior parietal lobe is unchanged from previous and may represent a vessel. 3. No new or enlarging metastatic lesions visualized.   I personally reviewed the images/study and I agree with the resident findings as stated by Guadalupe Mishra MD.  This study was interpreted at [facility], [place], [place].   MACRO: None   Signed by: Yash Greer 6/20/2024 11:51 AM Dictation workstation:   [id number]      ASSESSMENT/PLAN:  74 y.o. female with metastatic lung cancer to the brain s/p gamma knife SRS.  Doing well post treatment. She continues maintenance Keytruda under the care of Dr. Gonsales at [place].  MRI in 3 mo with rad onc FUV after scan.  She was provided my contact number for any questions or concerns.     Krystal Arnold CNP  [phone]

## 2024-06-21 ENCOUNTER — APPOINTMENT (OUTPATIENT)
Dept: RADIOLOGY | Facility: HOSPITAL | Age: 74
End: 2024-06-21
Payer: MEDICARE

## 2024-06-25 ENCOUNTER — INFUSION (OUTPATIENT)
Dept: HEMATOLOGY/ONCOLOGY | Facility: CLINIC | Age: 74
End: 2024-06-25
Payer: MEDICARE

## 2024-06-25 ENCOUNTER — NUTRITION (OUTPATIENT)
Dept: RADIATION ONCOLOGY | Facility: CLINIC | Age: 74
End: 2024-06-25
Payer: MEDICARE

## 2024-06-25 VITALS
OXYGEN SATURATION: 98 % | HEART RATE: 93 BPM | DIASTOLIC BLOOD PRESSURE: 67 MMHG | RESPIRATION RATE: 18 BRPM | SYSTOLIC BLOOD PRESSURE: 121 MMHG | TEMPERATURE: 97.9 F

## 2024-06-25 DIAGNOSIS — C79.31 MALIGNANT NEOPLASM METASTATIC TO BRAIN (MULTI): ICD-10-CM

## 2024-06-25 DIAGNOSIS — C34.31 SQUAMOUS CELL CARCINOMA OF LOWER LOBE OF RIGHT LUNG (MULTI): ICD-10-CM

## 2024-06-25 PROCEDURE — 2500000004 HC RX 250 GENERAL PHARMACY W/ HCPCS (ALT 636 FOR OP/ED): Performed by: INTERNAL MEDICINE

## 2024-06-25 PROCEDURE — 96360 HYDRATION IV INFUSION INIT: CPT | Mod: INF

## 2024-06-25 PROCEDURE — 96361 HYDRATE IV INFUSION ADD-ON: CPT | Mod: INF

## 2024-06-25 RX ORDER — ALBUTEROL SULFATE 0.83 MG/ML
3 SOLUTION RESPIRATORY (INHALATION) AS NEEDED
OUTPATIENT
Start: 2024-06-25

## 2024-06-25 RX ORDER — HEPARIN SODIUM,PORCINE/PF 10 UNIT/ML
50 SYRINGE (ML) INTRAVENOUS AS NEEDED
OUTPATIENT
Start: 2024-06-25

## 2024-06-25 RX ORDER — HEPARIN 100 UNIT/ML
500 SYRINGE INTRAVENOUS AS NEEDED
Status: DISCONTINUED | OUTPATIENT
Start: 2024-06-25 | End: 2024-06-25 | Stop reason: HOSPADM

## 2024-06-25 RX ORDER — HEPARIN 100 UNIT/ML
500 SYRINGE INTRAVENOUS AS NEEDED
OUTPATIENT
Start: 2024-06-25

## 2024-06-25 RX ORDER — FAMOTIDINE 10 MG/ML
20 INJECTION INTRAVENOUS ONCE AS NEEDED
OUTPATIENT
Start: 2024-06-25

## 2024-06-25 RX ORDER — SODIUM CHLORIDE 9 MG/ML
500 INJECTION, SOLUTION INTRAVENOUS CONTINUOUS
OUTPATIENT
Start: 2024-06-25

## 2024-06-25 RX ORDER — DIPHENHYDRAMINE HYDROCHLORIDE 50 MG/ML
50 INJECTION INTRAMUSCULAR; INTRAVENOUS AS NEEDED
OUTPATIENT
Start: 2024-06-25

## 2024-06-25 RX ORDER — EPINEPHRINE 0.3 MG/.3ML
0.3 INJECTION SUBCUTANEOUS EVERY 5 MIN PRN
OUTPATIENT
Start: 2024-06-25

## 2024-06-25 RX ORDER — SODIUM CHLORIDE 9 MG/ML
500 INJECTION, SOLUTION INTRAVENOUS CONTINUOUS
Status: DISCONTINUED | OUTPATIENT
Start: 2024-06-25 | End: 2024-06-25 | Stop reason: HOSPADM

## 2024-06-25 ASSESSMENT — PAIN SCALES - GENERAL: PAINLEVEL: 0-NO PAIN

## 2024-06-25 NOTE — PROGRESS NOTES
NUTRITION Follow up NOTE    Nutrition Assessment     Reason for Visit:  Janee Maddox is a 74 y.o. 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 Treatment with carboplatin/taxol/keytruda.     RD met with patient during hydration. She reports she is eating better and has been more focused on adequate fluids. Does have a yeast infection requiring IV antibiotics at home, to start next week.     Patient Active Problem List   Diagnosis    Abdominal aortic ectasia (CMS/HCC)    Abdominal pannus    Anxiety    Change in bowel habits    Chronic cough    Chronic cystitis    Cutaneous skin tags    Dependent edema    Depression, major, in remission (CMS/HCC)    Excess skin of abdomen    Genitourinary syndrome of menopause    GERD (gastroesophageal reflux disease)    Hypercholesteremia    Impaired fasting glucose    Nocturia    Numbness and tingling    Obesity    Osteoarthritis of wrist    Other insomnia    Rhinorrhea    Sensorineural hearing loss of both ears    Urge incontinence of urine    Urinary urgency    Vaginal itching    Class 2 obesity with body mass index (BMI) of 35.0 to 35.9 in adult    Long-term current use of benzodiazepine    ESBL (extended spectrum beta-lactamase) producing bacteria infection    Dysuria    Diverticulosis    Diverticulitis of intestine    COPD (chronic obstructive pulmonary disease) (CMS/HCC)    Collapse    Coronary atherosclerosis due to severely calcified coronary lesion    CAD (coronary artery disease)    C. difficile diarrhea    Agoraphobia    Abnormal CT scan, lung    Knee pain    Recurrent sinusitis    Panic attack    Otitis media    Open wound of anterior abdominal wall    Onychomycosis    Nicotine use disorder    Cigarette nicotine dependence with nicotine-induced disorder    Incisional hernia    ILD (interstitial lung disease) (CMS/HCC)    Generalized anxiety disorder with panic attacks    Anxiety and depression    Right knee pain    Right middle lobe  "pulmonary nodule    Tinnitus    Umbilical hernia    UTI (urinary tract infection)    Aortic ectasia, abdominal (CMS/HCC)    Diarrhea    Abscess    Obesity with body mass index 30 or greater    Obesity, Class I, BMI 30-34.9    Skin tag    Excess skin of abdominal wall    Gastroesophageal reflux disease    Pure hypercholesterolemia    Numbness and tingling sensation of skin    Insomnia    Sensorineural hearing loss, bilateral    Squamous cell carcinoma of lower lobe of right lung (CMS/HCC)       Lab Results   Component Value Date/Time    GLUCOSE 127 (H) 05/30/2024 1120     05/30/2024 1120    K 4.3 05/30/2024 1120     05/30/2024 1120    CO2 25 05/30/2024 1120    ANIONGAP 12 05/30/2024 1120    BUN 22 05/30/2024 1120    CREATININE 1.04 05/30/2024 1120    EGFR 56 (L) 05/30/2024 1120    CALCIUM 9.4 05/30/2024 1120    ALBUMIN 3.9 05/30/2024 1120    ALKPHOS 57 05/30/2024 1120    PROT 6.7 05/30/2024 1120    AST 14 05/30/2024 1120    BILITOT 0.3 05/30/2024 1120    ALT 11 05/30/2024 1120      Anthropometrics:  Anthropometrics  Height: 152.9 cm (5' 0.2\")  Weight: 69.5kg  BMI (Calculated): 28.95  Weight Change  Weight History / % Weight Change: slight decline in weights, not significant but should continue to be monitored    Wt Readings from Last 20 Encounters:   06/20/24 69.5 kg (153 lb 3.2 oz)   06/11/24 69.2 kg (152 lb 8.9 oz)   05/30/24 70 kg (154 lb 5.2 oz)   05/14/24 71.6 kg (157 lb 13.6 oz)   05/06/24 71.6 kg (157 lb 13.6 oz)   05/03/24 72 kg (158 lb 11.7 oz)   04/19/24 71.6 kg (157 lb 13.6 oz)   04/15/24 73.3 kg (161 lb 9.6 oz)   04/12/24 73 kg (160 lb 15 oz)   04/04/24 73.6 kg (162 lb 4.1 oz)   04/03/24 72.1 kg (159 lb)   03/25/24 72.4 kg (159 lb 9.8 oz)   06/20/24 68.9 kg (152 lb)   03/19/24 74.4 kg (164 lb)   03/11/24 73 kg (160 lb 15 oz)   03/05/24 74.7 kg (164 lb 9.6 oz)   02/26/24 73.2 kg (161 lb 6 oz)   02/21/24 73.2 kg (161 lb 6 oz)   02/19/24 73.6 kg (162 lb 4.1 oz)   02/16/24 74.8 kg (165 lb) " "    Food And Nutrient Intake (same as previous)  Food and Nutrient History  Food and Nutrient History: Patient and daughter report patient is historically a very picky eater.  Energy Intake: Fair 50-75 %  Fluid Intake: has been working hard to increase fluid, unsure of amount consumed per day  Food Allergy: NKFA  GI Symptoms: early satiety  GI Symptoms greater than 2 weeks: yes  Oral Problems: dysgeusia     Food Preparation  Cooking: Patient, Family  Grocery Shopping: Patient, Family    Medication and Complementary/Alternative Medicine Use  Prescription Medication Use: compazine, zofran, ativan  Vit/Minerals: Urinary and Bladder supplement (dandelion, D-mannose, cranberry), reports she is taking a vit B12/zinc supplement. Discussed taking a 30 day break from Zinc then adding back to see if this helps with taste changes (potential copper deficiency 2/2 supplementation)    Nutrition Focused Physical Exam Findings:  Muscle and fat WNL, 6/25/24    Energy Needs (continue as previous)  Calculated Energy Needs Using Equations  Height: 155 cm (5' 1.02\")  Based on ABW: 57.9kg    1448-1737kcals (25-30kcals/kg)  58-69g protein (1-1.2g/kg)  1448-1737mL fluid (25-30mL/kg)    Nutrition Diagnosis   Malnutrition Diagnosis  Patient has Malnutrition Diagnosis: No    Nutrition Diagnosis  Patient has Nutrition Diagnosis: Yes  Diagnosis Status (1): Ongoing  Related to (1): Increased nutrient needs  As Evidenced by (1): catabolic disease and treatment  Additional Assessment Information (1): SCC of right lung, tx of pembro/paclitaxel/carboplatin, hx of inadequate fluid intake    Nutrition Interventions/Recommendations (as previous)  Nutrition Prescription  Individualized Nutrition Prescription Provided for : diet for cancer care    Food and Nutrition Delivery  Food and Nutrition Delivery  Meals & Snacks: Energy-modified diet, Protein-modified diet  Goals: Patient will focus on healthy/whole foods based diet, but ensure she is getting " adequate protein and calories to meet nutrient needs.  Other:: Fluid  Goals: Patient will meet RD recommended fluid needs through fluids and high fluid foods.  Recommend increasing intake, multiple small meals per day. Also recommend Boost Plus 3x daily to assist in meeting nutrient needs.    Nutrition Education  Nutrition Education  Nutrition Education Content: Content related nutrition education  Goals: patient will understand the role diet plays in cancer care, recovery, and survivorship. Provided taste changes handout and discussed salt and soda mouth rinse    Coordination of Care  Coordination of Nutrition Care by a Nutrition Professional  Collaboration and referral of nutrition care: Collaboration by nutrition professional with other providers  Goals: RD will continue to work with oncology team to ensure best outcomes possible    Nutrition Monitoring and Evaluation (as previous)  Food/Nutrient Related History Monitoring  Monitoring and Evaluation Plan: Energy intake, Protein intake, Meal/snack pattern  Energy Intake: Estimated energy intake  Criteria: patient will meet RD calculated caloric needs to support nutrition during treatment  Meal/Snack Pattern: Estimated meal and snack pattern  Criteria: patient will meet caloric needs by modifiying time and amount of food consumed (i.e. 5-6 small meals per day)  Estimated protein intake: Estimated protein intake  Criteria: patient will meet RD calculated protein needs via foods and adding ONS if needed  Body Composition/Growth/Weight History  Monitoring and Evaluation Plan: Weight change  Weight Change: Weight change percentage  Criteria: patient will maintain weight throughout treatment  Biochemical Data, Medical Tests and Procedures  Monitoring and Evaluation Plan: Electrolyte/renal panel  Electrolyte and Renal Panel: BUN, Calcium, serum, Chloride, Creatinine, Magnesium, Phosphorus, Potassium, Sodium  Nutrition Focused Physical Findings  Monitoring and Evaluation  Plan: Muscles  Muscles: Muscle atrophy  Criteria: patient will maintain muscle mass throughout treatment    Time Spent  Prep time on day of patient encounter: 10 minutes  Time spent directly with patient, family or caregiver: 20 minutes  Documentation Time: 10 minutes    Readiness to Change : Poor  Level of Understanding : Fair  Anticipated Compliant : Poor

## 2024-06-27 DIAGNOSIS — R35.0 FREQUENCY OF URINATION: Primary | ICD-10-CM

## 2024-06-27 RX ORDER — SOLIFENACIN SUCCINATE 10 MG/1
10 TABLET, FILM COATED ORAL DAILY
Qty: 30 TABLET | Refills: 5 | Status: SHIPPED | OUTPATIENT
Start: 2024-06-27 | End: 2024-12-24

## 2024-07-09 RX ORDER — PROCHLORPERAZINE EDISYLATE 5 MG/ML
10 INJECTION INTRAMUSCULAR; INTRAVENOUS EVERY 6 HOURS PRN
Status: CANCELLED | OUTPATIENT
Start: 2024-07-11

## 2024-07-09 RX ORDER — EPINEPHRINE 0.3 MG/.3ML
0.3 INJECTION SUBCUTANEOUS EVERY 5 MIN PRN
Status: CANCELLED | OUTPATIENT
Start: 2024-07-11

## 2024-07-09 RX ORDER — PROCHLORPERAZINE MALEATE 10 MG
10 TABLET ORAL EVERY 6 HOURS PRN
Status: CANCELLED | OUTPATIENT
Start: 2024-07-11

## 2024-07-09 RX ORDER — DIPHENHYDRAMINE HYDROCHLORIDE 50 MG/ML
50 INJECTION INTRAMUSCULAR; INTRAVENOUS AS NEEDED
Status: CANCELLED | OUTPATIENT
Start: 2024-07-11

## 2024-07-09 RX ORDER — ALBUTEROL SULFATE 0.83 MG/ML
3 SOLUTION RESPIRATORY (INHALATION) AS NEEDED
Status: CANCELLED | OUTPATIENT
Start: 2024-07-11

## 2024-07-09 RX ORDER — FAMOTIDINE 10 MG/ML
20 INJECTION INTRAVENOUS ONCE AS NEEDED
Status: CANCELLED | OUTPATIENT
Start: 2024-07-11

## 2024-07-10 ENCOUNTER — OFFICE VISIT (OUTPATIENT)
Dept: HEMATOLOGY/ONCOLOGY | Facility: CLINIC | Age: 74
End: 2024-07-10
Payer: MEDICARE

## 2024-07-10 ENCOUNTER — SOCIAL WORK (OUTPATIENT)
Dept: HEMATOLOGY/ONCOLOGY | Facility: CLINIC | Age: 74
End: 2024-07-10

## 2024-07-10 ENCOUNTER — INFUSION (OUTPATIENT)
Dept: HEMATOLOGY/ONCOLOGY | Facility: CLINIC | Age: 74
End: 2024-07-10
Payer: MEDICARE

## 2024-07-10 VITALS
HEART RATE: 88 BPM | WEIGHT: 152.12 LBS | SYSTOLIC BLOOD PRESSURE: 121 MMHG | OXYGEN SATURATION: 96 % | HEIGHT: 60 IN | TEMPERATURE: 96.8 F | BODY MASS INDEX: 29.86 KG/M2 | RESPIRATION RATE: 20 BRPM | DIASTOLIC BLOOD PRESSURE: 69 MMHG

## 2024-07-10 VITALS
TEMPERATURE: 98.1 F | OXYGEN SATURATION: 97 % | HEART RATE: 84 BPM | SYSTOLIC BLOOD PRESSURE: 118 MMHG | DIASTOLIC BLOOD PRESSURE: 68 MMHG | RESPIRATION RATE: 20 BRPM

## 2024-07-10 DIAGNOSIS — C79.31 MALIGNANT NEOPLASM METASTATIC TO BRAIN (MULTI): ICD-10-CM

## 2024-07-10 DIAGNOSIS — C34.31 SQUAMOUS CELL CARCINOMA OF LOWER LOBE OF RIGHT LUNG (MULTI): ICD-10-CM

## 2024-07-10 DIAGNOSIS — D80.3 IGG DEFICIENCY (MULTI): ICD-10-CM

## 2024-07-10 DIAGNOSIS — C34.31 SQUAMOUS CELL CARCINOMA OF LOWER LOBE OF RIGHT LUNG (MULTI): Primary | ICD-10-CM

## 2024-07-10 LAB
ALBUMIN SERPL BCP-MCNC: 4 G/DL (ref 3.4–5)
ALP SERPL-CCNC: 60 U/L (ref 33–136)
ALT SERPL W P-5'-P-CCNC: 12 U/L (ref 7–45)
ANION GAP SERPL CALC-SCNC: 13 MMOL/L (ref 10–20)
AST SERPL W P-5'-P-CCNC: 13 U/L (ref 9–39)
BASOPHILS # BLD AUTO: 0.05 X10*3/UL (ref 0–0.1)
BASOPHILS NFR BLD AUTO: 1 %
BILIRUB SERPL-MCNC: 0.4 MG/DL (ref 0–1.2)
BUN SERPL-MCNC: 25 MG/DL (ref 6–23)
CALCIUM SERPL-MCNC: 9.5 MG/DL (ref 8.6–10.3)
CHLORIDE SERPL-SCNC: 105 MMOL/L (ref 98–107)
CO2 SERPL-SCNC: 26 MMOL/L (ref 21–32)
CREAT SERPL-MCNC: 1.44 MG/DL (ref 0.5–1.05)
EGFRCR SERPLBLD CKD-EPI 2021: 38 ML/MIN/1.73M*2
EOSINOPHIL # BLD AUTO: 0.75 X10*3/UL (ref 0–0.4)
EOSINOPHIL NFR BLD AUTO: 15.4 %
ERYTHROCYTE [DISTWIDTH] IN BLOOD BY AUTOMATED COUNT: 13.8 % (ref 11.5–14.5)
GLUCOSE SERPL-MCNC: 108 MG/DL (ref 74–99)
HCT VFR BLD AUTO: 34.4 % (ref 36–46)
HGB BLD-MCNC: 11.1 G/DL (ref 12–16)
IMM GRANULOCYTES # BLD AUTO: 0.02 X10*3/UL (ref 0–0.5)
IMM GRANULOCYTES NFR BLD AUTO: 0.4 % (ref 0–0.9)
LYMPHOCYTES # BLD AUTO: 1.09 X10*3/UL (ref 0.8–3)
LYMPHOCYTES NFR BLD AUTO: 22.4 %
MCH RBC QN AUTO: 31.2 PG (ref 26–34)
MCHC RBC AUTO-ENTMCNC: 32.3 G/DL (ref 32–36)
MCV RBC AUTO: 97 FL (ref 80–100)
MONOCYTES # BLD AUTO: 0.3 X10*3/UL (ref 0.05–0.8)
MONOCYTES NFR BLD AUTO: 6.2 %
NEUTROPHILS # BLD AUTO: 2.65 X10*3/UL (ref 1.6–5.5)
NEUTROPHILS NFR BLD AUTO: 54.6 %
NRBC BLD-RTO: 0 /100 WBCS (ref 0–0)
PLATELET # BLD AUTO: 165 X10*3/UL (ref 150–450)
POTASSIUM SERPL-SCNC: 4.1 MMOL/L (ref 3.5–5.3)
PROT SERPL-MCNC: 6.9 G/DL (ref 6.4–8.2)
RBC # BLD AUTO: 3.56 X10*6/UL (ref 4–5.2)
SODIUM SERPL-SCNC: 140 MMOL/L (ref 136–145)
TSH SERPL-ACNC: 1.4 MIU/L (ref 0.44–3.98)
WBC # BLD AUTO: 4.9 X10*3/UL (ref 4.4–11.3)

## 2024-07-10 PROCEDURE — 99214 OFFICE O/P EST MOD 30 MIN: CPT | Performed by: INTERNAL MEDICINE

## 2024-07-10 PROCEDURE — 2500000001 HC RX 250 WO HCPCS SELF ADMINISTERED DRUGS (ALT 637 FOR MEDICARE OP): Performed by: INTERNAL MEDICINE

## 2024-07-10 PROCEDURE — 96367 TX/PROPH/DG ADDL SEQ IV INF: CPT

## 2024-07-10 PROCEDURE — 1126F AMNT PAIN NOTED NONE PRSNT: CPT | Performed by: INTERNAL MEDICINE

## 2024-07-10 PROCEDURE — 84443 ASSAY THYROID STIM HORMONE: CPT

## 2024-07-10 PROCEDURE — 96366 THER/PROPH/DIAG IV INF ADDON: CPT | Mod: INF

## 2024-07-10 PROCEDURE — 84075 ASSAY ALKALINE PHOSPHATASE: CPT

## 2024-07-10 PROCEDURE — 1159F MED LIST DOCD IN RCRD: CPT | Performed by: INTERNAL MEDICINE

## 2024-07-10 PROCEDURE — 85025 COMPLETE CBC W/AUTO DIFF WBC: CPT

## 2024-07-10 PROCEDURE — 96413 CHEMO IV INFUSION 1 HR: CPT

## 2024-07-10 PROCEDURE — 2500000004 HC RX 250 GENERAL PHARMACY W/ HCPCS (ALT 636 FOR OP/ED): Performed by: INTERNAL MEDICINE

## 2024-07-10 RX ORDER — ALBUTEROL SULFATE 0.83 MG/ML
3 SOLUTION RESPIRATORY (INHALATION) AS NEEDED
OUTPATIENT
Start: 2024-08-06

## 2024-07-10 RX ORDER — DIPHENHYDRAMINE HCL 25 MG
25 CAPSULE ORAL ONCE
OUTPATIENT
Start: 2024-08-06

## 2024-07-10 RX ORDER — HEPARIN 100 UNIT/ML
500 SYRINGE INTRAVENOUS AS NEEDED
OUTPATIENT
Start: 2024-07-10

## 2024-07-10 RX ORDER — HEPARIN 100 UNIT/ML
500 SYRINGE INTRAVENOUS AS NEEDED
Status: DISCONTINUED | OUTPATIENT
Start: 2024-07-10 | End: 2024-07-10 | Stop reason: HOSPADM

## 2024-07-10 RX ORDER — ACETAMINOPHEN 325 MG/1
650 TABLET ORAL ONCE
OUTPATIENT
Start: 2024-08-06

## 2024-07-10 RX ORDER — HEPARIN SODIUM,PORCINE/PF 10 UNIT/ML
50 SYRINGE (ML) INTRAVENOUS AS NEEDED
OUTPATIENT
Start: 2024-07-10

## 2024-07-10 RX ORDER — ACETAMINOPHEN 325 MG/1
650 TABLET ORAL ONCE
Status: COMPLETED | OUTPATIENT
Start: 2024-07-10 | End: 2024-07-10

## 2024-07-10 RX ORDER — FAMOTIDINE 10 MG/ML
20 INJECTION INTRAVENOUS ONCE AS NEEDED
OUTPATIENT
Start: 2024-08-06

## 2024-07-10 RX ORDER — EPINEPHRINE 0.3 MG/.3ML
0.3 INJECTION SUBCUTANEOUS EVERY 5 MIN PRN
OUTPATIENT
Start: 2024-08-06

## 2024-07-10 RX ORDER — DIPHENHYDRAMINE HYDROCHLORIDE 50 MG/ML
50 INJECTION INTRAMUSCULAR; INTRAVENOUS AS NEEDED
OUTPATIENT
Start: 2024-08-06

## 2024-07-10 RX ORDER — DIPHENHYDRAMINE HCL 25 MG
25 CAPSULE ORAL ONCE
Status: COMPLETED | OUTPATIENT
Start: 2024-07-10 | End: 2024-07-10

## 2024-07-10 ASSESSMENT — PAIN SCALES - GENERAL: PAINLEVEL: 0-NO PAIN

## 2024-07-10 NOTE — PROGRESS NOTES
Patient ID: : Janee Maddox            Primary Care Provider: Frances Cervantes MD    LOCATION:  [facility]    HEMATOLOGY ONCOLOGY PROBLEMS:  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  TP53 mutated.        a. Ist line therapy with carboplatin/Taxol/Keytruda X 4 cycles from Feb to May 2024.        b. F/U PET scan from May 2024 with excellent response.          c. S/p stereotactic radiation to the brain lesion in March 2024.        d.  Maintenance immunotherapy with Keytruda beginning May 2024    CHIEF COMPLAINTS:  Patient is in the clinic today for evaluation of right lung squamous cell carcinoma and for continuation of the therapy and management of therapy-related effects.    HISTORY:  Janee Maddox is a 74 y.o. female with right lung squamous cell carcinoma.  She is a lifelong smoker and was admitted at [facility] in Nov 2023 with complaints of shortness of breath, chest discomfort and positive bacteremia.  During the evaluation she was noted to have a large cavitary lesion in the right lung along with pleural effusion.  CT scan at that time showed a 6.1 x 5 cm right lower lobe cavitary lung mass along with loculated right-sided pleural effusion and prominent mediastinal and hilar lymphadenopathy.  Overall findings were concerning for either abscess or baseline malignancy.  She was transferred to [facility] where a CT-guided biopsy confirmed invasive squamous cell carcinoma.  PD-L1 expression was 5%.  Tumor NGS panel was mainly suggestive of TP53 mutation.  During hospitalization her clinical course was also complicated by Pantoea bacteremia and an ESBL Ecoli UTI.  She was treated with antibiotics and other supportive care.  A follow-up PET scan from Jan 2024 confirmed increased metabolic activity in the right lower lobe pleural-based cavitary mass along with mediastinal and right hilar lymphadenopathy and right-sided pleural disease  consistent with known malignancy.  There was no evidence of distant metastatic disease.  Brain MRI showed small solitary left parietal metastatic lesion and she is s/p stereotactic radiation treatment in 2024.  She was treated with first-line therapy with carbo/Taxol/Keytruda  X 4 cycles from Feb to May 2024.  Treatment course was complicated by recurrent UTI and other issues.  Posttreatment follow-up PET scan from May 2024 showed excellent response.  She is currently being treated with maintenance immunotherapy with Keytruda since May 2024.    INTERVAL HISTORY:  She is tolerating immunotherapy well.  As stated in the last note, follow-up PET scan showed excellent response.  Clinically she is feeling better today.  Still has multiple other chronic symptomatology and chronic issues with intermittent UTIs.    PAST MEDICAL HISTORY:  1.  Right lower lobe squamous cell lung cancer as detailed above.  2.  Coronary artery disease  3.  Hypertension  4.  Hyperlipidemia  5.  GERD  6.  Anxiety/depression  7.  History of C. difficile colitis  8.  E. coli UTI.  9.  History of complete hysterectomy, cholecystectomy and incisional hernia surgeries    SOCIAL HISTORY:  She lives alone in [place].  Quit smoking in 2023.  1 pack a day for 60 years prior smoking history.  Admit to occasional alcohol intake.  She work as a tax preparor.  Born and raised in [place].    FAMILY HISTORY:  Father  at age 86 from coronary artery disease. Mother also  at age 86 from multiple medical issues.  She had 1 sister and 4/2 siblings.  One of them  from myocardial infarction.  3 children, 7 grandkids and 1 great grand kid ~ all alive and well.  No other family history of bleeding, clotting or malignant disorders in the family history.    REVIEW OF SYSTEMS:   Pertinent finding as per the history above.  All other systems have been reviewed and generally negative and noncontributory.    VITAL SIGNS  BSA: 1.71 meters  "squared  /69   Pulse 88   Temp 36 °C (96.8 °F) (Temporal)   Resp 20   Ht (S) 1.529 m (5' 0.2\")   Wt 69 kg (152 lb 1.9 oz)   SpO2 96%   BMI 29.51 kg/m²     PHYSICAL EXAMINATION:  Detailed physical examination not done.    LAB DATA:  CBC from today shows a hemoglobin of 11.1 with MCV of 90.  White cell count is normal at 4.9 and platelets are 165K.  Metabolic profile is unremarkable other than creatinine of 1.4.    RADIOLOGICAL DATA:  Brain MRI 02/10/2024  Impression:  There is a 3 mm focus of enhancement within the posterior frontal lobe on the left worrisome for an intracranial metastatic lesion. There is no associated mass effect. No midline shift.  There is a 1 mm focus of increased signal within the right parietal lobe on the diffusion-weighted images which may represent a recent infarct versus artifact. No definite associated enhancement.  Volume loss and mild degree of nonspecific white matter signal compatible with microangiopathy. 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    PET scan 5/23/2024   Impression:  1.  Significantly decreased size and metabolic activity within a cavitary right lower lobe lung mass invading the right chest wall with residual moderate hypermetabolic activity compatible with residual viable disease. No new hypermetabolic pulmonary nodules.  2. Significant interval decrease in size and metabolic activity with mediastinal lymphadenopathy with residual metabolic activity concerning for residual viable disease. No new hypermetabolic  lymphadenopathy within the chest, abdomen, or pelvis.  3. Interval development of focal hypermetabolic activity at the right sternoclavicular joint favored to be degenerative in nature. No evidence of hypermetabolic osseous metastatic disease throughout the remainder of the body.     PATHOLOGY DATA:  Surgical Pathology Exam: [id number]   FINAL DIAGNOSIS   A. LUNG, RIGHT; BIOPSY:    -- INVASIVE SQUAMOUS CELL CARCINOMA, KERATINIZING. See comment "   Electronically signed by Lino Carrillo MD on 12/1/2023      PD-L1 22C3  (KEYTRUDA)Tumor Proportion Score (TPS): 5%   MICROSATELLITE STATUS: Microsatellite Instability-High (MSI-H) is NOT DETECTED.  DISEASE ASSOCIATED GENOMIC FINDINGS:   TP53 p.G245V ([id number] c.734G>T)  DISEASE RELEVANT ALTERATIONS NOT DETECTED:   Negative for ALK fusion.  Negative for BRAF V600E.  Negative for EGFR sensitizing mutation.  Negative for ERBB2 activating mutation  Negative for KRAS G12C.  Negative for MET exon 14 skipping mutation.  Negative for NTRK fusion.  Negative for RET fusion.  Negative for ROS1 fusion.    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  TP53 mutated.  Please refer  to the details of initial presentation and management as outlined above.  In summary she is a lifelong smoker and was noted to have a large cavitary right lower lung pleural-based lesion along with extensive mediastinal and hilar lymphadenopathy and loculated pleural effusion in November 2023.  CT-guided biopsy was confirmatory of invasive squamous cell carcinoma.  PD-L1 TPS score was 5%.  NGS panel was unremarkable other than finding of TP53 mutation.  Brain MRI showed a small 3 mm left frontal lobe lesion concerning for metastatic disease.  She received stereotactic brain radiation in March 2024 and was treated with first-line therapy with carbo/Taxol/Keytruda X 4 cycles from Feb to May 2024.  Treatment course was complicated by recurrent UTI and other issues.  Posttreatment follow-up PET scan from May 2024 showed excellent response.  She is currently being treated with maintenance immunotherapy with Keytruda since May 2024.    She will continue with maintenance immunotherapy with Keytruda at 6 weekly interval.  Probable benefit and side effect profile of mainly  weakness, fatigue, colitis, pneumonitis, endocrinopathies, transaminitis etc. were explained to them in detail.  Her overall prognosis remain  guarded.    2.  Brain mets.  MRI results were suggestive of small solitary left parietal lobe lesion.  She is s/p stereotactic radiation therapy in Mar 2024. She will continue to F/U with radiation oncology team.    3.  Recurrent UTI.  As stated above she again had a prolonged hospitalization at [facility] with E. coli UTI in April 2024.  As per  Dr. Schroeder (infectious disease) suggestion, she is currently being treated with monthly IgG infusion.  Of note she was noted to have low IgG levels.      4.  Follow-up.  Follow-up with me in 6 weeks.  In the meantime she will come to the clinic for continuation of the treatment as per the protocol.      This note has been transcribed using Dragon voice recognition system and there is a possibility of unintentional typing misprints.

## 2024-07-10 NOTE — PROGRESS NOTES
Followed up with pt. Pt mentions her friend dropped her off today. Pt seems to be doing more and is trying to get her energy back. Pt now on maintenance keytruda. Pt is hoping to continue to gain stamina. Pt talked about on some days she does not want to get out of her chair but she is still able to care for her self and adls. Pt is planning to take a cruise next [place] a river cruise in [place] that ends up in [facility]. This is pt's goal and the last places she has not visited over seas. Pt appears to be in good spirits and seems to be managing well.

## 2024-07-17 DIAGNOSIS — R30.0 DYSURIA: Primary | ICD-10-CM

## 2024-07-18 ENCOUNTER — INFUSION (OUTPATIENT)
Dept: HEMATOLOGY/ONCOLOGY | Facility: CLINIC | Age: 74
End: 2024-07-18
Payer: MEDICARE

## 2024-07-18 VITALS
TEMPERATURE: 97.2 F | OXYGEN SATURATION: 86 % | RESPIRATION RATE: 18 BRPM | SYSTOLIC BLOOD PRESSURE: 142 MMHG | DIASTOLIC BLOOD PRESSURE: 64 MMHG | HEART RATE: 81 BPM

## 2024-07-18 DIAGNOSIS — C34.31 SQUAMOUS CELL CARCINOMA OF LOWER LOBE OF RIGHT LUNG (MULTI): ICD-10-CM

## 2024-07-18 DIAGNOSIS — R30.0 DYSURIA: ICD-10-CM

## 2024-07-18 DIAGNOSIS — C79.31 MALIGNANT NEOPLASM METASTATIC TO BRAIN (MULTI): ICD-10-CM

## 2024-07-18 LAB
APPEARANCE UR: ABNORMAL
BACTERIA #/AREA URNS AUTO: ABNORMAL /HPF
BILIRUB UR STRIP.AUTO-MCNC: NEGATIVE MG/DL
COLOR UR: ABNORMAL
GLUCOSE UR STRIP.AUTO-MCNC: NORMAL MG/DL
KETONES UR STRIP.AUTO-MCNC: NEGATIVE MG/DL
LEUKOCYTE ESTERASE UR QL STRIP.AUTO: ABNORMAL
MUCOUS THREADS #/AREA URNS AUTO: ABNORMAL /LPF
NITRITE UR QL STRIP.AUTO: NEGATIVE
PH UR STRIP.AUTO: 5.5 [PH]
PROT UR STRIP.AUTO-MCNC: NEGATIVE MG/DL
RBC # UR STRIP.AUTO: ABNORMAL /UL
RBC #/AREA URNS AUTO: ABNORMAL /HPF
SP GR UR STRIP.AUTO: 1.01
SQUAMOUS #/AREA URNS AUTO: ABNORMAL /HPF
UROBILINOGEN UR STRIP.AUTO-MCNC: NORMAL MG/DL
WBC #/AREA URNS AUTO: >50 /HPF
WBC CLUMPS #/AREA URNS AUTO: ABNORMAL /HPF
YEAST BUDDING #/AREA UR COMP ASSIST: PRESENT /HPF

## 2024-07-18 PROCEDURE — 96361 HYDRATE IV INFUSION ADD-ON: CPT | Mod: INF

## 2024-07-18 PROCEDURE — 2500000004 HC RX 250 GENERAL PHARMACY W/ HCPCS (ALT 636 FOR OP/ED): Performed by: INTERNAL MEDICINE

## 2024-07-18 PROCEDURE — 81001 URINALYSIS AUTO W/SCOPE: CPT

## 2024-07-18 PROCEDURE — 87186 SC STD MICRODIL/AGAR DIL: CPT | Mod: PARLAB

## 2024-07-18 PROCEDURE — 96360 HYDRATION IV INFUSION INIT: CPT | Mod: INF

## 2024-07-18 RX ORDER — HEPARIN 100 UNIT/ML
500 SYRINGE INTRAVENOUS AS NEEDED
OUTPATIENT
Start: 2024-07-18

## 2024-07-18 RX ORDER — DIPHENHYDRAMINE HYDROCHLORIDE 50 MG/ML
50 INJECTION INTRAMUSCULAR; INTRAVENOUS AS NEEDED
OUTPATIENT
Start: 2024-07-18

## 2024-07-18 RX ORDER — FAMOTIDINE 10 MG/ML
20 INJECTION INTRAVENOUS ONCE AS NEEDED
OUTPATIENT
Start: 2024-07-18

## 2024-07-18 RX ORDER — ALBUTEROL SULFATE 0.83 MG/ML
3 SOLUTION RESPIRATORY (INHALATION) AS NEEDED
OUTPATIENT
Start: 2024-07-18

## 2024-07-18 RX ORDER — SODIUM CHLORIDE 9 MG/ML
500 INJECTION, SOLUTION INTRAVENOUS CONTINUOUS
Status: DISCONTINUED | OUTPATIENT
Start: 2024-07-18 | End: 2024-07-18 | Stop reason: HOSPADM

## 2024-07-18 RX ORDER — SODIUM CHLORIDE 9 MG/ML
500 INJECTION, SOLUTION INTRAVENOUS CONTINUOUS
OUTPATIENT
Start: 2024-07-18

## 2024-07-18 RX ORDER — EPINEPHRINE 0.3 MG/.3ML
0.3 INJECTION SUBCUTANEOUS EVERY 5 MIN PRN
OUTPATIENT
Start: 2024-07-18

## 2024-07-18 RX ORDER — HEPARIN SODIUM,PORCINE/PF 10 UNIT/ML
50 SYRINGE (ML) INTRAVENOUS AS NEEDED
OUTPATIENT
Start: 2024-07-18

## 2024-07-18 RX ORDER — HEPARIN 100 UNIT/ML
500 SYRINGE INTRAVENOUS AS NEEDED
Status: DISCONTINUED | OUTPATIENT
Start: 2024-07-18 | End: 2024-07-18 | Stop reason: HOSPADM

## 2024-07-18 ASSESSMENT — PAIN SCALES - GENERAL: PAINLEVEL: 0-NO PAIN

## 2024-07-21 LAB — BACTERIA UR CULT: ABNORMAL

## 2024-08-12 ENCOUNTER — NUTRITION (OUTPATIENT)
Dept: RADIATION ONCOLOGY | Facility: CLINIC | Age: 74
End: 2024-08-12
Payer: MEDICARE

## 2024-08-12 NOTE — PROGRESS NOTES
"NUTRITION Follow up NOTE    Nutrition Assessment     Reason for Visit:  Janee Maddox is a 74 y.o. 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 Treatment with carboplatin/taxol/keytruda.     Reached out to patient, daughter answered, patient not present. Per daughter, eating better. \"More herself\". Trying to drink more water. Taste is much improved. No acute nutrition concerns outside of inadequate fluid intake, which we have been working on for a while.     Patient Active Problem List   Diagnosis    Abdominal aortic ectasia (CMS/HCC)    Abdominal pannus    Anxiety    Change in bowel habits    Chronic cough    Chronic cystitis    Cutaneous skin tags    Dependent edema    Depression, major, in remission (CMS/HCC)    Excess skin of abdomen    Genitourinary syndrome of menopause    GERD (gastroesophageal reflux disease)    Hypercholesteremia    Impaired fasting glucose    Nocturia    Numbness and tingling    Obesity    Osteoarthritis of wrist    Other insomnia    Rhinorrhea    Sensorineural hearing loss of both ears    Urge incontinence of urine    Urinary urgency    Vaginal itching    Class 2 obesity with body mass index (BMI) of 35.0 to 35.9 in adult    Long-term current use of benzodiazepine    ESBL (extended spectrum beta-lactamase) producing bacteria infection    Dysuria    Diverticulosis    Diverticulitis of intestine    COPD (chronic obstructive pulmonary disease) (CMS/HCC)    Collapse    Coronary atherosclerosis due to severely calcified coronary lesion    CAD (coronary artery disease)    C. difficile diarrhea    Agoraphobia    Abnormal CT scan, lung    Knee pain    Recurrent sinusitis    Panic attack    Otitis media    Open wound of anterior abdominal wall    Onychomycosis    Nicotine use disorder    Cigarette nicotine dependence with nicotine-induced disorder    Incisional hernia    ILD (interstitial lung disease) (CMS/HCC)    Generalized anxiety disorder with panic attacks " "   Anxiety and depression    Right knee pain    Right middle lobe pulmonary nodule    Tinnitus    Umbilical hernia    UTI (urinary tract infection)    Aortic ectasia, abdominal (CMS/HCC)    Diarrhea    Abscess    Obesity with body mass index 30 or greater    Obesity, Class I, BMI 30-34.9    Skin tag    Excess skin of abdominal wall    Gastroesophageal reflux disease    Pure hypercholesterolemia    Numbness and tingling sensation of skin    Insomnia    Sensorineural hearing loss, bilateral    Squamous cell carcinoma of lower lobe of right lung (CMS/HCC)      No recent labs.     Anthropometrics:  Anthropometrics  Height: 152.9 cm (5' 0.2\")  Weight: 69.5kg  BMI (Calculated): 28.95  Weight Change  Weight History / % Weight Change: weight loss has slowed, stable x 1 month. Will continue to monitor.     Wt Readings from Last 20 Encounters:   07/10/24 69 kg (152 lb 1.9 oz)   06/20/24 69.5 kg (153 lb 3.2 oz)   06/11/24 69.2 kg (152 lb 8.9 oz)   05/30/24 70 kg (154 lb 5.2 oz)   05/14/24 71.6 kg (157 lb 13.6 oz)   05/06/24 71.6 kg (157 lb 13.6 oz)   05/03/24 72 kg (158 lb 11.7 oz)   04/19/24 71.6 kg (157 lb 13.6 oz)   04/15/24 73.3 kg (161 lb 9.6 oz)   04/12/24 73 kg (160 lb 15 oz)   04/04/24 73.6 kg (162 lb 4.1 oz)   04/03/24 72.1 kg (159 lb)   03/25/24 72.4 kg (159 lb 9.8 oz)   06/20/24 68.9 kg (152 lb)   03/19/24 74.4 kg (164 lb)   03/11/24 73 kg (160 lb 15 oz)   03/05/24 74.7 kg (164 lb 9.6 oz)   02/26/24 73.2 kg (161 lb 6 oz)   02/21/24 73.2 kg (161 lb 6 oz)   02/19/24 73.6 kg (162 lb 4.1 oz)     Food And Nutrient Intake (as previous)  Food and Nutrient History  Food and Nutrient History: Patient and daughter report patient is historically a very picky eater.  Energy Intake: Fair 50-75 %  Fluid Intake: has been working hard to increase fluid, unsure of amount consumed per day  Food Allergy: NKFA  GI Symptoms: early satiety  GI Symptoms greater than 2 weeks: yes  Oral Problems: dysgeusia     Food Preparation  Cooking: " "Patient, Family  Grocery Shopping: Patient, Family    Medication and Complementary/Alternative Medicine Use  Prescription Medication Use: compazine, zofran, ativan  Vit/Minerals: Urinary and Bladder supplement (dandelion, D-mannose, cranberry), reports she is taking a vit B12/zinc supplement. Discussed taking a 30 day break from Zinc then adding back to see if this helps with taste changes (potential copper deficiency 2/2 supplementation)    Nutrition Focused Physical Exam Findings:  Muscle and fat WNL, 6/25/24    Energy Needs (continue as previous)  Calculated Energy Needs Using Equations  Height: 155 cm (5' 1.02\")  Based on ABW: 57.9kg    1448-1737kcals (25-30kcals/kg)  58-69g protein (1-1.2g/kg)  1448-1737mL fluid (25-30mL/kg)    Nutrition Diagnosis   Malnutrition Diagnosis  Patient has Malnutrition Diagnosis: No    Nutrition Diagnosis  Patient has Nutrition Diagnosis: Yes  Diagnosis Status (1): Ongoing  Related to (1): Increased nutrient needs  As Evidenced by (1): catabolic disease and treatment  Additional Assessment Information (1): SCC of right lung, tx of pembro/paclitaxel/carboplatin, hx of inadequate fluid intake    Nutrition Interventions/Recommendations (as previous)  Nutrition Prescription  Individualized Nutrition Prescription Provided for : diet for cancer care    Food and Nutrition Delivery  Food and Nutrition Delivery  Meals & Snacks: Energy-modified diet, Protein-modified diet  Goals: Patient will focus on healthy/whole foods based diet, but ensure she is getting adequate protein and calories to meet nutrient needs.  Other:: Fluid  Goals: Patient will meet RD recommended fluid needs through fluids and high fluid foods.  Recommend increasing intake, multiple small meals per day. Also recommend Boost Plus 3x daily to assist in meeting nutrient needs.  Patient to continue to eat/drink to maintain weight. She continues to decline ONS as she does not like the taste, recommend Maynard Instant " Breakfast trial.     Nutrition Education  Nutrition Education  Nutrition Education Content: Content related nutrition education  Goals: patient will understand the role diet plays in cancer care, recovery, and survivorship.   Continue to encourage oral hygiene.     Coordination of Care  Coordination of Nutrition Care by a Nutrition Professional  Collaboration and referral of nutrition care: Collaboration by nutrition professional with other providers  Goals: RD will continue to work with oncology team to ensure best outcomes possible    Nutrition Monitoring and Evaluation (as previous)  Food/Nutrient Related History Monitoring  Monitoring and Evaluation Plan: Energy intake, Protein intake, Meal/snack pattern  Energy Intake: Estimated energy intake  Criteria: patient will meet RD calculated caloric needs to support nutrition during treatment  Meal/Snack Pattern: Estimated meal and snack pattern  Criteria: patient will meet caloric needs by modifiying time and amount of food consumed (i.e. 5-6 small meals per day)  Estimated protein intake: Estimated protein intake  Criteria: patient will meet RD calculated protein needs via foods and adding ONS if needed  Body Composition/Growth/Weight History  Monitoring and Evaluation Plan: Weight change  Weight Change: Weight change percentage  Criteria: patient will maintain weight throughout treatment  Biochemical Data, Medical Tests and Procedures  Monitoring and Evaluation Plan: Electrolyte/renal panel  Electrolyte and Renal Panel: BUN, Calcium, serum, Chloride, Creatinine, Magnesium, Phosphorus, Potassium, Sodium  Nutrition Focused Physical Findings  Monitoring and Evaluation Plan: Muscles  Muscles: Muscle atrophy  Criteria: patient will maintain muscle mass throughout treatment    Time Spent  Prep time on day of patient encounter: 10 minutes  Time spent directly with patient, family or caregiver: 10 minutes  Documentation Time: 10 minutes    Readiness to Change : Poor  Level  of Understanding : Fair  Anticipated Compliant : Poor

## 2024-08-19 RX ORDER — PROCHLORPERAZINE MALEATE 10 MG
10 TABLET ORAL EVERY 6 HOURS PRN
Status: CANCELLED | OUTPATIENT
Start: 2024-08-21

## 2024-08-19 RX ORDER — PROCHLORPERAZINE EDISYLATE 5 MG/ML
10 INJECTION INTRAMUSCULAR; INTRAVENOUS EVERY 6 HOURS PRN
Status: CANCELLED | OUTPATIENT
Start: 2024-08-21

## 2024-08-21 ENCOUNTER — INFUSION (OUTPATIENT)
Dept: HEMATOLOGY/ONCOLOGY | Facility: CLINIC | Age: 74
End: 2024-08-21
Payer: MEDICARE

## 2024-08-21 ENCOUNTER — OFFICE VISIT (OUTPATIENT)
Dept: HEMATOLOGY/ONCOLOGY | Facility: CLINIC | Age: 74
End: 2024-08-21
Payer: MEDICARE

## 2024-08-21 ENCOUNTER — APPOINTMENT (OUTPATIENT)
Dept: LAB | Facility: CLINIC | Age: 74
End: 2024-08-21
Payer: MEDICARE

## 2024-08-21 ENCOUNTER — SOCIAL WORK (OUTPATIENT)
Dept: HEMATOLOGY/ONCOLOGY | Facility: CLINIC | Age: 74
End: 2024-08-21

## 2024-08-21 VITALS
OXYGEN SATURATION: 95 % | SYSTOLIC BLOOD PRESSURE: 124 MMHG | RESPIRATION RATE: 18 BRPM | TEMPERATURE: 97.7 F | DIASTOLIC BLOOD PRESSURE: 64 MMHG | HEART RATE: 75 BPM

## 2024-08-21 VITALS
OXYGEN SATURATION: 96 % | TEMPERATURE: 98.1 F | SYSTOLIC BLOOD PRESSURE: 104 MMHG | HEART RATE: 80 BPM | DIASTOLIC BLOOD PRESSURE: 69 MMHG | WEIGHT: 152.78 LBS | BODY MASS INDEX: 27.07 KG/M2 | RESPIRATION RATE: 20 BRPM | HEIGHT: 63 IN

## 2024-08-21 DIAGNOSIS — C34.31 SQUAMOUS CELL CARCINOMA OF LOWER LOBE OF RIGHT LUNG (MULTI): ICD-10-CM

## 2024-08-21 DIAGNOSIS — C34.31 SQUAMOUS CELL CARCINOMA OF LOWER LOBE OF RIGHT LUNG (MULTI): Primary | ICD-10-CM

## 2024-08-21 DIAGNOSIS — C34.11 SQUAMOUS CELL CARCINOMA OF UPPER LOBE OF RIGHT LUNG (MULTI): ICD-10-CM

## 2024-08-21 DIAGNOSIS — C79.31 MALIGNANT NEOPLASM METASTATIC TO BRAIN (MULTI): ICD-10-CM

## 2024-08-21 LAB
ALBUMIN SERPL BCP-MCNC: 3.7 G/DL (ref 3.4–5)
ALP SERPL-CCNC: 42 U/L (ref 33–136)
ALT SERPL W P-5'-P-CCNC: 11 U/L (ref 7–45)
ANION GAP SERPL CALC-SCNC: 11 MMOL/L (ref 10–20)
AST SERPL W P-5'-P-CCNC: 12 U/L (ref 9–39)
BASOPHILS # BLD AUTO: 0.06 X10*3/UL (ref 0–0.1)
BASOPHILS NFR BLD AUTO: 1.6 %
BILIRUB SERPL-MCNC: 0.3 MG/DL (ref 0–1.2)
BUN SERPL-MCNC: 14 MG/DL (ref 6–23)
CALCIUM SERPL-MCNC: 8.9 MG/DL (ref 8.6–10.3)
CHLORIDE SERPL-SCNC: 109 MMOL/L (ref 98–107)
CO2 SERPL-SCNC: 24 MMOL/L (ref 21–32)
CREAT SERPL-MCNC: 1.3 MG/DL (ref 0.5–1.05)
EGFRCR SERPLBLD CKD-EPI 2021: 43 ML/MIN/1.73M*2
EOSINOPHIL # BLD AUTO: 0.43 X10*3/UL (ref 0–0.4)
EOSINOPHIL NFR BLD AUTO: 11.1 %
ERYTHROCYTE [DISTWIDTH] IN BLOOD BY AUTOMATED COUNT: 14.6 % (ref 11.5–14.5)
GLUCOSE SERPL-MCNC: 119 MG/DL (ref 74–99)
HCT VFR BLD AUTO: 33.1 % (ref 36–46)
HGB BLD-MCNC: 10.2 G/DL (ref 12–16)
IMM GRANULOCYTES # BLD AUTO: 0.05 X10*3/UL (ref 0–0.5)
IMM GRANULOCYTES NFR BLD AUTO: 1.3 % (ref 0–0.9)
LYMPHOCYTES # BLD AUTO: 0.91 X10*3/UL (ref 0.8–3)
LYMPHOCYTES NFR BLD AUTO: 23.5 %
MCH RBC QN AUTO: 29.6 PG (ref 26–34)
MCHC RBC AUTO-ENTMCNC: 30.8 G/DL (ref 32–36)
MCV RBC AUTO: 96 FL (ref 80–100)
MONOCYTES # BLD AUTO: 0.24 X10*3/UL (ref 0.05–0.8)
MONOCYTES NFR BLD AUTO: 6.2 %
NEUTROPHILS # BLD AUTO: 2.18 X10*3/UL (ref 1.6–5.5)
NEUTROPHILS NFR BLD AUTO: 56.3 %
NRBC BLD-RTO: 0 /100 WBCS (ref 0–0)
PLATELET # BLD AUTO: 189 X10*3/UL (ref 150–450)
POTASSIUM SERPL-SCNC: 3.3 MMOL/L (ref 3.5–5.3)
PROT SERPL-MCNC: 6.3 G/DL (ref 6.4–8.2)
RBC # BLD AUTO: 3.45 X10*6/UL (ref 4–5.2)
SODIUM SERPL-SCNC: 141 MMOL/L (ref 136–145)
TSH SERPL-ACNC: 0.87 MIU/L (ref 0.44–3.98)
WBC # BLD AUTO: 3.9 X10*3/UL (ref 4.4–11.3)

## 2024-08-21 PROCEDURE — 36593 DECLOT VASCULAR DEVICE: CPT

## 2024-08-21 PROCEDURE — 80053 COMPREHEN METABOLIC PANEL: CPT

## 2024-08-21 PROCEDURE — 2500000004 HC RX 250 GENERAL PHARMACY W/ HCPCS (ALT 636 FOR OP/ED): Mod: JZ | Performed by: INTERNAL MEDICINE

## 2024-08-21 PROCEDURE — 99215 OFFICE O/P EST HI 40 MIN: CPT | Performed by: INTERNAL MEDICINE

## 2024-08-21 PROCEDURE — 84443 ASSAY THYROID STIM HORMONE: CPT

## 2024-08-21 PROCEDURE — 85025 COMPLETE CBC W/AUTO DIFF WBC: CPT

## 2024-08-21 PROCEDURE — 1125F AMNT PAIN NOTED PAIN PRSNT: CPT | Performed by: INTERNAL MEDICINE

## 2024-08-21 PROCEDURE — 96413 CHEMO IV INFUSION 1 HR: CPT

## 2024-08-21 PROCEDURE — 1159F MED LIST DOCD IN RCRD: CPT | Performed by: INTERNAL MEDICINE

## 2024-08-21 RX ORDER — HEPARIN SODIUM,PORCINE/PF 10 UNIT/ML
50 SYRINGE (ML) INTRAVENOUS AS NEEDED
OUTPATIENT
Start: 2024-08-21

## 2024-08-21 RX ORDER — HEPARIN 100 UNIT/ML
500 SYRINGE INTRAVENOUS AS NEEDED
Status: DISCONTINUED | OUTPATIENT
Start: 2024-08-21 | End: 2024-08-21 | Stop reason: HOSPADM

## 2024-08-21 RX ORDER — DIPHENHYDRAMINE HYDROCHLORIDE 50 MG/ML
50 INJECTION INTRAMUSCULAR; INTRAVENOUS AS NEEDED
Status: DISCONTINUED | OUTPATIENT
Start: 2024-08-21 | End: 2024-08-21 | Stop reason: HOSPADM

## 2024-08-21 RX ORDER — DIPHENOXYLATE HYDROCHLORIDE AND ATROPINE SULFATE 2.5; .025 MG/1; MG/1
1 TABLET ORAL 4 TIMES DAILY PRN
Qty: 30 TABLET | Refills: 2 | Status: SHIPPED | OUTPATIENT
Start: 2024-08-21

## 2024-08-21 RX ORDER — EPINEPHRINE 0.3 MG/.3ML
0.3 INJECTION SUBCUTANEOUS EVERY 5 MIN PRN
Status: DISCONTINUED | OUTPATIENT
Start: 2024-08-21 | End: 2024-08-21 | Stop reason: HOSPADM

## 2024-08-21 RX ORDER — FAMOTIDINE 10 MG/ML
20 INJECTION INTRAVENOUS ONCE AS NEEDED
Status: DISCONTINUED | OUTPATIENT
Start: 2024-08-21 | End: 2024-08-21 | Stop reason: HOSPADM

## 2024-08-21 RX ORDER — ALBUTEROL SULFATE 0.83 MG/ML
3 SOLUTION RESPIRATORY (INHALATION) AS NEEDED
Status: DISCONTINUED | OUTPATIENT
Start: 2024-08-21 | End: 2024-08-21 | Stop reason: HOSPADM

## 2024-08-21 RX ORDER — HEPARIN 100 UNIT/ML
500 SYRINGE INTRAVENOUS AS NEEDED
Status: CANCELLED | OUTPATIENT
Start: 2024-08-21

## 2024-08-21 RX ORDER — HEPARIN 100 UNIT/ML
500 SYRINGE INTRAVENOUS AS NEEDED
OUTPATIENT
Start: 2024-08-21

## 2024-08-21 RX ORDER — HEPARIN SODIUM,PORCINE/PF 10 UNIT/ML
50 SYRINGE (ML) INTRAVENOUS AS NEEDED
Status: CANCELLED | OUTPATIENT
Start: 2024-08-21

## 2024-08-21 ASSESSMENT — PAIN SCALES - GENERAL: PAINLEVEL: 3

## 2024-08-21 NOTE — PROGRESS NOTES
Patient ID: : Janee Maddox            Primary Care Provider: Frances Cervantes MD    LOCATION:  [facility]    HEMATOLOGY ONCOLOGY PROBLEMS:  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  TP53 mutated.        a. Ist line therapy with carboplatin/Taxol/Keytruda X 4 cycles from Feb to May 2024.        b. F/U PET scan from May 2024 with excellent response.          c. S/p stereotactic radiation to the brain lesion in March 2024.        d.  Maintenance immunotherapy with Keytruda beginning May 2024    CHIEF COMPLAINTS:  Patient is in the clinic today for evaluation of right lung squamous cell carcinoma and for continuation of the therapy and management of therapy-related effects.    HISTORY:  Janee Maddox is a 74 y.o. female with right lung squamous cell carcinoma.  She is a lifelong smoker and was admitted at [facility] in Nov 2023 with complaints of shortness of breath, chest discomfort and positive bacteremia.  During the evaluation she was noted to have a large cavitary lesion in the right lung along with pleural effusion.  CT scan at that time showed a 6.1 x 5 cm right lower lobe cavitary lung mass along with loculated right-sided pleural effusion and prominent mediastinal and hilar lymphadenopathy.  Overall findings were concerning for either abscess or baseline malignancy.  She was transferred to [facility] where a CT-guided biopsy confirmed invasive squamous cell carcinoma.  PD-L1 expression was 5%.  Tumor NGS panel was mainly suggestive of TP53 mutation.  During hospitalization her clinical course was also complicated by Pantoea bacteremia and an ESBL Ecoli UTI.  She was treated with antibiotics and other supportive care.  A follow-up PET scan from Jan 2024 confirmed increased metabolic activity in the right lower lobe pleural-based cavitary mass along with mediastinal and right hilar lymphadenopathy and right-sided pleural disease  consistent with known malignancy.  There was no evidence of distant metastatic disease.  Brain MRI showed small solitary left parietal metastatic lesion and she is s/p stereotactic radiation treatment in 2024.  She was treated with first-line therapy with carbo/Taxol/Keytruda  X 4 cycles from Feb to May 2024.  Treatment course was complicated by recurrent UTI and other issues.  Posttreatment follow-up PET scan from May 2024 showed excellent response.  She is currently being treated with maintenance immunotherapy with Keytruda since May 2024.    INTERVAL HISTORY:  She is tolerating immunotherapy well but lately is complaining of issues with intermittent but persistent diarrhea.  As stated in the last note, follow-up PET scan and brain MRI showed excellent response. Multiple other chronic symptomatology and chronic issues with intermittent UTIs.    PAST MEDICAL HISTORY:  1.  Right lower lobe squamous cell lung cancer as detailed above.  2.  Coronary artery disease  3.  Hypertension  4.  Hyperlipidemia  5.  GERD  6.  Anxiety/depression  7.  History of C. difficile colitis  8.  E. coli UTI.  9.  History of complete hysterectomy, cholecystectomy and incisional hernia surgeries    SOCIAL HISTORY:  She lives alone in [place].  Quit smoking in 2023.  1 pack a day for 60 years prior smoking history.  Admit to occasional alcohol intake.  She work as a tax preparor.  Born and raised in [place].    FAMILY HISTORY:  Father  at age 86 from coronary artery disease. Mother also  at age 86 from multiple medical issues.  She had 1 sister and 4/2 siblings.  One of them  from myocardial infarction.  3 children, 7 grandkids and 1 great grand kid ~ all alive and well.  No other family history of bleeding, clotting or malignant disorders in the family history.    REVIEW OF SYSTEMS:   Pertinent finding as per the history above.  All other systems have been reviewed and generally negative and  "noncontributory.    VITAL SIGNS  BSA: 1.75 meters squared  /69   Pulse 80   Temp 36.7 °C (98.1 °F) (Temporal)   Resp 20   Ht (S) 1.592 m (5' 2.68\")   Wt 69.3 kg (152 lb 12.5 oz)   SpO2 96%   BMI 27.34 kg/m²     PHYSICAL EXAMINATION:  Detailed physical examination not done.    LAB DATA:  CBC from today shows a hemoglobin of 10.2 with MCV of 96.  White cell count is normal at 3.4 and platelets are 189K.  Metabolic profile and TSH are pending.  Last 3 sets of blood work were reviewed and the trend was noted.    RADIOLOGICAL DATA:  Brain MRI 6/20/2024   Impression:  1. Status post gamma knife therapy with resolution of right occipital lobe and left precentral gyrus enhancing foci.  2. Nonspecific enhancement involving the left posterior parietal lobe is unchanged from previous and may represent a vessel.  3. No new or enlarging metastatic lesions visualized.    PET scan 5/23/2024   Impression:  1.  Significantly decreased size and metabolic activity within a cavitary right lower lobe lung mass invading the right chest wall with residual moderate hypermetabolic activity compatible with residual viable disease. No new hypermetabolic pulmonary nodules.  2. Significant interval decrease in size and metabolic activity with mediastinal lymphadenopathy with residual metabolic activity concerning for residual viable disease. No new hypermetabolic  lymphadenopathy within the chest, abdomen, or pelvis.  3. Interval development of focal hypermetabolic activity at the right sternoclavicular joint favored to be degenerative in nature. No evidence of hypermetabolic osseous metastatic disease throughout the remainder of the body.     PATHOLOGY DATA:  Surgical Pathology Exam: [id number]   FINAL DIAGNOSIS   A. LUNG, RIGHT; BIOPSY:    -- INVASIVE SQUAMOUS CELL CARCINOMA, KERATINIZING. See comment   Electronically signed by Lino Carrillo MD on 12/1/2023      PD-L1 22C3  (KEYTRUDA)Tumor Proportion Score (TPS): 5% "   MICROSATELLITE STATUS: Microsatellite Instability-High (MSI-H) is NOT DETECTED.  DISEASE ASSOCIATED GENOMIC FINDINGS:   TP53 p.G245V ([id number] c.734G>T)  DISEASE RELEVANT ALTERATIONS NOT DETECTED:   Negative for ALK fusion.  Negative for BRAF V600E.  Negative for EGFR sensitizing mutation.  Negative for ERBB2 activating mutation  Negative for KRAS G12C.  Negative for MET exon 14 skipping mutation.  Negative for NTRK fusion.  Negative for RET fusion.  Negative for ROS1 fusion.    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  TP53 mutated.  Please refer  to the details of initial presentation and management as outlined above.  In summary she is a lifelong smoker and was noted to have a large cavitary right lower lung pleural-based lesion along with extensive mediastinal and hilar lymphadenopathy and loculated pleural effusion in November 2023.  CT-guided biopsy was confirmatory of invasive squamous cell carcinoma.  PD-L1 TPS score was 5%.  NGS panel was unremarkable other than finding of TP53 mutation.  Brain MRI showed a small 3 mm left frontal lobe lesion concerning for metastatic disease.  She received stereotactic brain radiation in March 2024 and was treated with first-line therapy with carbo/Taxol/Keytruda X 4 cycles from Feb to May 2024.  Treatment course was complicated by recurrent UTI and other issues.  Posttreatment follow-up PET scan from May 2024 showed excellent response.  She is currently being treated with maintenance immunotherapy with Keytruda since May 2024.    She will continue with maintenance immunotherapy with Keytruda at 6 weekly interval.  Probable benefit and side effect profile of mainly  weakness, fatigue, colitis, pneumonitis, endocrinopathies, transaminitis etc. were explained to them in detail.  Her overall prognosis remain guarded.    2.  Brain mets.  MRI results were suggestive of small solitary left parietal lobe lesion.  She is s/p  stereotactic radiation therapy in Mar 2024. She will continue to F/U with radiation oncology team.    3.  Recurrent UTI.  As stated above she again had a prolonged hospitalization at [facility] with E. coli UTI in April 2024.  As per  Dr. Schroeder (infectious disease) suggestion, she is currently being treated with monthly IgG infusion.  Of note she was noted to have low IgG levels.      4.  Diarrhea.  No clear etiology.  There is always possibility of colitis with immunotherapy but her symptoms seems to be milder and different.  She has tried Imodium.  I will try her on Lomotil.  She already has appointment with the GI team in September.    5.  Follow-up.  Follow-up with me in 6 weeks.  In the meantime she will come to the clinic for continuation of the treatment as per the protocol.      This note has been transcribed using Dragon voice recognition system and there is a possibility of unintentional typing misprints.

## 2024-08-21 NOTE — PROGRESS NOTES
Followed up with pt. Pt continues on keytruda. Pt has been dealing with 6 mos of diarrhea and urinary tract infections one after the other. Pt has found it hard to go too far from home due to not knowing when she will have to use the bathroom. Pt frustrated by being held hostage by this. Pt has an upcoming appt with GI doc and will be seeing a new gyn urinary specialist as well. Pt hoping this can be resolved for her to be able to do more things. Pt still has a goal of going to [place] on a river cruise. Pt's dtr and one of her friends do come over during the week. When dtr goes to do shopping she brings pt's grd dog over for a visit as well. Pt seems to enjoy this.

## 2024-08-21 NOTE — SIGNIFICANT EVENT

## 2024-08-22 ENCOUNTER — APPOINTMENT (OUTPATIENT)
Dept: BEHAVIORAL HEALTH | Facility: CLINIC | Age: 74
End: 2024-08-22
Payer: MEDICARE

## 2024-08-22 DIAGNOSIS — G47.09 OTHER INSOMNIA: ICD-10-CM

## 2024-08-22 DIAGNOSIS — F32.5 DEPRESSION, MAJOR, IN REMISSION (CMS-HCC): ICD-10-CM

## 2024-08-22 DIAGNOSIS — F41.1 GAD (GENERALIZED ANXIETY DISORDER): ICD-10-CM

## 2024-08-22 PROCEDURE — 99213 OFFICE O/P EST LOW 20 MIN: CPT | Performed by: CLINICAL NURSE SPECIALIST

## 2024-08-22 RX ORDER — LORAZEPAM 1 MG/1
TABLET ORAL
Qty: 45 TABLET | Refills: 2 | Status: SHIPPED | OUTPATIENT
Start: 2024-08-22

## 2024-08-22 RX ORDER — BUPROPION HYDROCHLORIDE 300 MG/1
300 TABLET ORAL EVERY MORNING
Qty: 90 TABLET | Refills: 0 | Status: SHIPPED | OUTPATIENT
Start: 2024-08-22 | End: 2024-11-20

## 2024-08-22 RX ORDER — SERTRALINE HYDROCHLORIDE 25 MG/1
25 TABLET, FILM COATED ORAL DAILY
Qty: 30 TABLET | Refills: 2 | Status: SHIPPED | OUTPATIENT
Start: 2024-08-22 | End: 2024-09-21

## 2024-08-22 NOTE — PROGRESS NOTES
Outpatient Psychiatry      Subjective   Janee Maddox, a 74 y.o. female, presents as an established patient for an appointment with outpatient psychiatry for follow up/medication management . This is a virtual audio visual appointment she was at home for the appointment and verbally consented to the appointment , the appointment was in real time      Diagnosis:   Generalized anxiety disorder ( stable )   Depression major in remission  ( stable )   Other insomnia ( stable )      Treatment Plan/Recommendations:   Follow-up plan was discussed with patient.  can follow up for medications in 10-12  weeks. can continue self care and wellness efforts and maintain other appointments with other medical providers.   Psychotropic medications :  Continue  buPROPion XL (Wellbutrin XL) 300 mg 24 hr tablet, Take 1 tablet (300 mg) by mouth once daily in the morning. For depression   Continue LORazepam (Ativan) 1 mg tablet, 1 tablet each morning and 1/2 tablet each night for anxiety     Review with patient: Treatment plan reviewed with the patient.  Medication risks/benefit reviewed with the patient     HPI:Patient has been seeing oncology she completed  chemotherapy , for squamous cell carcinoma  She is having issues with panic attacks , she says she gets worried she will get shaky with driving , she gets afraid she will get dizzy , she does not have dizziness at home   No new cognitive changes   No falls recently   Reviewed notes in the [facility] per oncology   Reviewed labs in the [facility]   She has continued to work some, works as an  part time   Her family helps organize her medications   Denies having any new hospitalizations since the previous appointment   No issues with substance abuse   Anxiety is high with panic attacks when she is trying to go out or drive   Takes precautions to prevent falls     I have personally reviewed the OARRS report for JANEE MADDOX. I have considered the risks of abuse,  dependence, addiction and diversion.   I have the following concerns: no concerns on oarrs. No concerns , however will    Is the patient prescribed a combination of a benzodiazepine and opioid? No.   Last urine drug screening date/ordered  uds ordered 6/12/23 results as expected   Date of the last Controlled Substance Agreement: signed paper copy , csa in person appointment on 10/24/23  [facility]ZOD[facility]ZEPINES   What is the patient's goal of therapy? to manage pepe and support daily functioning.  Is this being achieved with current treatment? yes , she can attend to daily activities without interruption of intense anxiety.     [facility] as noted above          Patient Active Problem List   Diagnosis    Abdominal aortic ectasia (CMS-HCC)    Abdominal pannus    Anxiety    Change in bowel habits    Chronic cough    Chronic cystitis    Cutaneous skin tags    Dependent edema    Depression, major, in remission (CMS-HCC)    Excess skin of abdomen    Genitourinary syndrome of menopause    GERD (gastroesophageal reflux disease)    Hypercholesteremia    Impaired fasting glucose    Nocturia    Numbness and tingling    Obesity    Osteoarthritis of wrist    Other insomnia    Rhinorrhea    Sensorineural hearing loss of both ears    Urge incontinence of urine    Urinary urgency    Vaginal itching    Class 2 obesity with body mass index (BMI) of 35.0 to 35.9 in adult    Long-term current use of benzodiazepine    ESBL (extended spectrum beta-lactamase) producing bacteria infection    Dysuria    Diverticulosis    Diverticulitis of intestine    COPD (chronic obstructive pulmonary disease) (Multi)    Collapse    Coronary atherosclerosis due to severely calcified coronary lesion    CAD (coronary artery disease)    C. difficile diarrhea    Agoraphobia    Abnormal CT scan, lung    Knee pain    Recurrent sinusitis    Panic attack    Otitis media    Open wound of anterior abdominal wall    Onychomycosis    Nicotine use disorder     Cigarette nicotine dependence with nicotine-induced disorder    Incisional hernia    ILD (interstitial lung disease) (Multi)    Generalized anxiety disorder with panic attacks    Anxiety and depression    Right knee pain    Right middle lobe pulmonary nodule    Tinnitus    Umbilical hernia    UTI (urinary tract infection)    Aortic ectasia, abdominal (CMS-HCC)    Diarrhea    Abscess    Obesity with body mass index 30 or greater    Obesity, Class I, BMI 30-34.9    Skin tag    Excess skin of abdominal wall    Gastroesophageal reflux disease    Pure hypercholesterolemia    Numbness and tingling sensation of skin    Insomnia    Sensorineural hearing loss, bilateral    Squamous cell carcinoma of lower lobe of right lung (Multi)    Malignant neoplasm metastatic to brain (Multi)    IgG deficiency (Multi)     Review with patient: Treatment plan reviewed with the patient.  Medication risks/benefit reviewed with the patient    Current Outpatient Medications:     albuterol (Ventolin HFA) 90 mcg/actuation inhaler, Inhale 2 puffs if needed for wheezing or shortness of breath (every 4 to 6 hours as needed)., Disp: 18 g, Rfl: 2    ALBUTEROL INHL, 2 puffs, Inhalation, [id number], PRN PRN for wheezing, # 18 g, Refill(s) 2, Date: 10/12/2023 14:11:00 EDT, Pharmacy: [id number], 2 puffs Inhalation H1PMMLU,PRN:for wheezing, 154.94, 09/26/2023 19:59:00 EDT, Height/Length Dosing, cm, 86.8, 07/30/2023..., Disp: , Rfl:     buPROPion XL (Wellbutrin XL) 300 mg 24 hr tablet, Take 1 tablet (300 mg) by mouth once daily in the morning. Do not crush, chew, or split., Disp: 90 tablet, Rfl: 1    diphenoxylate-atropine (Lomotil) 2.5-0.025 mg tablet, Take 1 tablet by mouth 4 times a day as needed for diarrhea., Disp: 30 tablet, Rfl: 2    estradiol (Estrace) 0.01 % (0.1 mg/gram) vaginal cream, Apply daily 1 gram (large pea) for 3 weeks, then 3 times per week., Disp: 42.5 g, Rfl: 5    fosfomycin (Monurol) 3 gram packet, 3 gram every 72 hrs up to 3  doses total (Patient taking differently: 3 gram every 72 hrs up to 3 doses total  for uti), Disp: 9 g, Rfl: 0    LORazepam (Ativan) 1 mg tablet, 1 tablet each morning and 1/2 tablet each evening ., Disp: 45 tablet, Rfl: 2    omeprazole (PriLOSEC) 20 mg DR capsule, Take 1 capsule (20 mg) by mouth once daily in the morning. Take before meals., Disp: , Rfl:     ondansetron (Zofran) 8 mg tablet, Take 1 tablet (8 mg) by mouth every 8 hours if needed for nausea or vomiting., Disp: 30 tablet, Rfl: 2    peppermint oiL liquid, Apply topically., Disp: , Rfl:     prochlorperazine (Compazine) 10 mg tablet, Take 1 tablet (10 mg) by mouth every 6 hours if needed for nausea or vomiting., Disp: 30 tablet, Rfl: 2    psyllium seed, with sugar, (METAMUCIL, SUGAR, ORAL), ORAL, Refill(s) 0, Date: 07/08/2021 15:08:00 EDT, Disp: , Rfl:     solifenacin (VESIcare) 10 mg tablet, Take 1 tablet (10 mg) by mouth once daily. Swallow tablet whole; do not crush, chew, or split., Disp: 30 tablet, Rfl: 5  No current facility-administered medications for this visit.  Medical History:  Past Medical History:   Diagnosis Date    Agoraphobia, unspecified 12/06/2016    Agoraphobia    Body mass index (BMI) 33.0-33.9, adult     BMI 33.0-33.9,adult    Chronic sinusitis, unspecified     Recurrent sinus infections    Coronary artery disease     Cutaneous abscess, unspecified 06/01/2016    Abscess    Disruption of external operation (surgical) wound, not elsewhere classified, initial encounter 07/31/2017    Open abdominal incision with drainage    Diverticulitis of intestine, part unspecified, without perforation or abscess without bleeding 08/13/2018    Acute diverticulitis    Encounter for immunization 04/21/2021    Encounter for immunization    Encounter for screening for lipoid disorders 04/26/2016    Screening cholesterol level    Hypercholesteremia     Hypertension     IgG deficiency (Multi) 05/03/2024    Incisional hernia without obstruction or gangrene  09/11/2015    Incisional hernia    Infection following a procedure, other surgical site, initial encounter 04/20/2017    Incisional infection    Local infection of the skin and subcutaneous tissue, unspecified 05/01/2019    Skin infection    Other conditions influencing health status     Complete Colonoscopy    Other specified postprocedural states 05/15/2018    History of incision and drainage    Otitis media, unspecified, left ear 05/19/2014    Otitis media, left    Pain in right knee 02/16/2015    Bilateral knee pain    Personal history of other diseases of the circulatory system     History of hypertension    Personal history of other infectious and parasitic diseases 07/03/2018    History of onychomycosis    Personal history of other infectious and parasitic diseases 10/18/2019    History of Clostridioides difficile colitis    Personal history of other specified conditions 04/20/2017    History of dysuria    Personal history of other specified conditions 02/02/2017    History of nocturia    Personal history of other specified conditions 10/18/2019    History of diarrhea    Personal history of other specified conditions 12/06/2019    History of dysuria    Personal history of other specified conditions 02/16/2015    History of dyspnea    Personal history of urinary (tract) infections 12/11/2019    History of urinary tract infection    Squamous cell carcinoma of lower lobe of right lung (Multi) 01/24/2024    Syncope and collapse 01/05/2018    Collapse    Tinnitus, left ear 08/29/2014    Tinnitus of left ear    Unspecified open wound of abdominal wall, unspecified quadrant without penetration into peritoneal cavity, initial encounter 08/23/2019    Wound, open, abdominal wall, anterior     Surgical History:  Past Surgical History:   Procedure Laterality Date    CARPAL TUNNEL RELEASE  05/20/2014    Neuroplasty Decompression Median Nerve At Carpal Tunnel    CHOLECYSTECTOMY  05/20/2014    Cholecystectomy    COLONOSCOPY   2021    Complete Colonoscopy    CT GUIDED ASPIRATION OF ABSCESS, HEMATOMA, CYST  2023    CT GUIDED ASPIRATION OF ABSCESS, HEMATOMA, CYST 2023 PAR CT    CT GUIDED PERCUTANEOUS BIOPSY LUNG  2023    CT GUIDED PERCUTANEOUS BIOPSY LUNG 2023 PAR CT    HYSTERECTOMY  2014    Hysterectomy    KNEE ARTHROSCOPY W/ DEBRIDEMENT  2015    Knee Arthroscopy (Therapeutic)    OTHER SURGICAL HISTORY  2019    Incisional Hernia Repair    RECTAL VAGINAL FISTULECTOMY  2014    Rectocele Repair     Family History:  Family History   Problem Relation Name Age of Onset    Hypertension Mother      Other (cardiac disorder) Father      Heart attack Maternal Grandfather       Social History:  Social History     Socioeconomic History    Marital status:      Spouse name: Not on file    Number of children: Not on file    Years of education: Not on file    Highest education level: Not on file   Occupational History    Not on file   Tobacco Use    Smoking status: Former     Current packs/day: 0.00     Average packs/day: 1 pack/day for 62.0 years (62.0 ttl pk-yrs)     Types: Cigarettes     Start date: 1961     Quit date: 2023     Years since quittin.8    Smokeless tobacco: Not on file   Substance and Sexual Activity    Alcohol use: Not Currently    Drug use: Never    Sexual activity: Not on file   Other Topics Concern    Not on file   Social History Narrative    Not on file     Social Determinants of Health     Financial Resource Strain: High Risk (2023)    Overall Financial Resource Strain (CARDIA)     Difficulty of Paying Living Expenses: Hard   Food Insecurity: Not on file   Transportation Needs: No Transportation Needs (2023)    PRAPARE - Transportation     Lack of Transportation (Medical): No     Lack of Transportation (Non-Medical): No   Physical Activity: Not on file   Stress: Not on file   Social Connections: Not on file   Intimate Partner Violence: Not on  file   Housing Stability: Low Risk  (11/25/2023)    Housing Stability Vital Sign     Unable to Pay for Housing in the Last Year: No     Number of Places Lived in the Last Year: 1     Unstable Housing in the Last Year: No     Vitals:  There were no vitals filed for this visit.    Maday Wu, APRN-CNS

## 2024-09-23 ENCOUNTER — TELEPHONE (OUTPATIENT)
Dept: RADIATION ONCOLOGY | Facility: HOSPITAL | Age: 74
End: 2024-09-23
Payer: MEDICARE

## 2024-09-24 ENCOUNTER — HOSPITAL ENCOUNTER (OUTPATIENT)
Dept: RADIATION ONCOLOGY | Facility: HOSPITAL | Age: 74
Setting detail: RADIATION/ONCOLOGY SERIES
Discharge: HOME | End: 2024-09-24
Payer: MEDICARE

## 2024-09-24 ENCOUNTER — HOSPITAL ENCOUNTER (OUTPATIENT)
Dept: RADIOLOGY | Facility: CLINIC | Age: 74
Discharge: HOME | End: 2024-09-24
Payer: MEDICARE

## 2024-09-24 DIAGNOSIS — C34.31 SQUAMOUS CELL CARCINOMA OF LOWER LOBE OF RIGHT LUNG (MULTI): ICD-10-CM

## 2024-09-24 DIAGNOSIS — C79.31 MALIGNANT NEOPLASM METASTATIC TO BRAIN (MULTI): ICD-10-CM

## 2024-09-25 ENCOUNTER — HOSPITAL ENCOUNTER (OUTPATIENT)
Dept: RADIOLOGY | Facility: CLINIC | Age: 74
Discharge: HOME | End: 2024-09-25
Payer: MEDICARE

## 2024-09-25 PROCEDURE — 70553 MRI BRAIN STEM W/O & W/DYE: CPT

## 2024-09-25 PROCEDURE — 70553 MRI BRAIN STEM W/O & W/DYE: CPT | Performed by: RADIOLOGY

## 2024-09-25 PROCEDURE — 2550000001 HC RX 255 CONTRASTS: Performed by: NURSE PRACTITIONER

## 2024-09-25 PROCEDURE — A9575 INJ GADOTERATE MEGLUMI 0.1ML: HCPCS | Performed by: NURSE PRACTITIONER

## 2024-09-25 RX ORDER — GADOTERATE MEGLUMINE 376.9 MG/ML
13 INJECTION INTRAVENOUS
Status: COMPLETED | OUTPATIENT
Start: 2024-09-25 | End: 2024-09-25

## 2024-09-26 ENCOUNTER — HOSPITAL ENCOUNTER (OUTPATIENT)
Dept: RADIATION ONCOLOGY | Facility: HOSPITAL | Age: 74
Setting detail: RADIATION/ONCOLOGY SERIES
Discharge: HOME | End: 2024-09-26
Payer: MEDICARE

## 2024-09-26 ENCOUNTER — APPOINTMENT (OUTPATIENT)
Dept: BEHAVIORAL HEALTH | Facility: CLINIC | Age: 74
End: 2024-09-26
Payer: MEDICARE

## 2024-09-26 DIAGNOSIS — C79.31 MALIGNANT NEOPLASM METASTATIC TO BRAIN (MULTI): Primary | ICD-10-CM

## 2024-09-26 DIAGNOSIS — G47.09 OTHER INSOMNIA: ICD-10-CM

## 2024-09-26 DIAGNOSIS — F41.1 GAD (GENERALIZED ANXIETY DISORDER): ICD-10-CM

## 2024-09-26 DIAGNOSIS — F32.5 DEPRESSION, MAJOR, IN REMISSION (CMS-HCC): ICD-10-CM

## 2024-09-26 PROCEDURE — 99214 OFFICE O/P EST MOD 30 MIN: CPT

## 2024-09-26 PROCEDURE — 99212 OFFICE O/P EST SF 10 MIN: CPT | Performed by: CLINICAL NURSE SPECIALIST

## 2024-09-26 RX ORDER — BUPROPION HYDROCHLORIDE 300 MG/1
300 TABLET ORAL EVERY MORNING
Qty: 90 TABLET | Refills: 0 | Status: SHIPPED | OUTPATIENT
Start: 2024-09-26 | End: 2024-12-25

## 2024-09-26 RX ORDER — SERTRALINE HYDROCHLORIDE 25 MG/1
25 TABLET, FILM COATED ORAL DAILY
Qty: 90 TABLET | Refills: 0 | Status: SHIPPED | OUTPATIENT
Start: 2024-09-26 | End: 2024-12-25

## 2024-09-26 ASSESSMENT — ENCOUNTER SYMPTOMS
CHILLS: 0
DIARRHEA: 0
LIGHT-HEADEDNESS: 0
SHORTNESS OF BREATH: 0
SEIZURES: 0
NAUSEA: 0
DEPRESSION: 0
ABDOMINAL DISTENTION: 0
LEG SWELLING: 0
HEADACHES: 0
EYE PROBLEMS: 0
ADENOPATHY: 0
COUGH: 0
BACK PAIN: 0
CHEST TIGHTNESS: 0
DIZZINESS: 0
NECK STIFFNESS: 0
CONFUSION: 0
NECK PAIN: 0
VOMITING: 0
FATIGUE: 0
DIFFICULTY URINATING: 0
CONSTIPATION: 0
ABDOMINAL PAIN: 0
HEMATURIA: 0
TROUBLE SWALLOWING: 0
NERVOUS/ANXIOUS: 0
ARTHRALGIAS: 0
WHEEZING: 0
FEVER: 0
SPEECH DIFFICULTY: 0

## 2024-09-26 NOTE — PROGRESS NOTES
Outpatient Psychiatry      Subjective   Janee Maddox, a 74 y.o. female, presents as an established patient for an appointment with outpatient psychiatry for follow up/medication management .   Virtual or Telephone Consent    An interactive audio and video telecommunication system which permits real time communications between the patient (at the originating site) and provider (at the distant site) was utilized to provide this telehealth service.   Verbal consent was requested and obtained from Janee Maddox on this date, 10/01/24 for a telehealth visit.      Diagnosis:   Generalized anxiety disorder ( stable ) F 41.1   Depression major in remission  ( stable ) F 32.5  Other insomnia ( stable ) G 47.09     Treatment Plan/Recommendations:   Follow-up plan was discussed with patient.  can follow up for medications in 10-12  weeks. can continue self care and wellness efforts and maintain other appointments with other medical providers.   Psychotropic medications :  Continue  buPROPion XL (Wellbutrin XL) 300 mg 24 hr tablet, Take 1 tablet (300 mg) by mouth once daily in the morning. For depression   Continue LORazepam (Ativan) 1 mg tablet, 1 tablet each morning and 1/2 tablet each night for anxiety      Review with patient: Treatment plan reviewed with the patient.  Medication risks/benefit reviewed with the patient     HPI:Patient has been seeing oncology she completed  chemotherapy , for squamous cell carcinoma  She is having issues with panic attacks , she says she gets worried she will get shaky with driving , she gets afraid she will get dizzy also , she does not have dizziness at home , she is not driving longer distances , she has people whom can drive her places   No cognitive changes   No falls recently   Reviewed notes in the [facility] per oncology   Reviewed labs in the [facility]   She has continued to work some, works as an  part time   Her family helps organize her medications   Denies  having any new hospitalizations since the previous appointment   No issues with substance abuse   Takes precautions to prevent falls       I have personally reviewed the OARRS report for JIMENA SINHA. I have considered the risks of abuse, dependence, addiction and diversion.   I have the following concerns: no concerns on oarrs. No concerns , however will    Is the patient prescribed a combination of a benzodiazepine and opioid? No.   Last urine drug screening date/ordered , uds  2/2024 per another provider , results as expected   Date of the last Controlled Substance Agreement: signed csa in person on 10/24/23, reviewed verbally , and will do an in person appointment in march 2024 considering she is not driving   BENZODIAZEPINES   What is the patient's goal of therapy? to manage pepe and support daily functioning.  Is this being achieved with current treatment? yes , she can attend to daily activities without interruption of intense anxiety.      [facility] as noted above             Patient Active Problem List   Diagnosis    Abdominal aortic ectasia (CMS-HCC)    Abdominal pannus    Anxiety    Change in bowel habits    Chronic cough    Chronic cystitis    Cutaneous skin tags    Dependent edema    Depression, major, in remission (CMS-HCC)    Excess skin of abdomen    Genitourinary syndrome of menopause    GERD (gastroesophageal reflux disease)    Hypercholesteremia    Impaired fasting glucose    Nocturia    Numbness and tingling    Obesity    Osteoarthritis of wrist    Other insomnia    Rhinorrhea    Sensorineural hearing loss of both ears    Urge incontinence of urine    Urinary urgency    Vaginal itching    Class 2 obesity with body mass index (BMI) of 35.0 to 35.9 in adult    Long-term current use of benzodiazepine    ESBL (extended spectrum beta-lactamase) producing bacteria infection    Dysuria    Diverticulosis    Diverticulitis of intestine    COPD (chronic obstructive pulmonary disease)  (Multi)    Collapse    Coronary atherosclerosis due to severely calcified coronary lesion    CAD (coronary artery disease)    C. difficile diarrhea    Agoraphobia    Abnormal CT scan, lung    Knee pain    Recurrent sinusitis    Panic attack    Otitis media    Open wound of anterior abdominal wall    Onychomycosis    Nicotine use disorder    Cigarette nicotine dependence with nicotine-induced disorder    Incisional hernia    ILD (interstitial lung disease) (Multi)    Generalized anxiety disorder with panic attacks    Anxiety and depression    Right knee pain    Right middle lobe pulmonary nodule    Tinnitus    Umbilical hernia    UTI (urinary tract infection)    Aortic ectasia, abdominal (CMS-HCC)    Diarrhea    Abscess    Obesity with body mass index 30 or greater    Obesity, Class I, BMI 30-34.9    Skin tag    Excess skin of abdominal wall    Gastroesophageal reflux disease    Pure hypercholesterolemia    Numbness and tingling sensation of skin    Insomnia    Sensorineural hearing loss, bilateral    Squamous cell carcinoma of lower lobe of right lung (Multi)    Malignant neoplasm metastatic to brain (Multi)    IgG deficiency (Multi)     Current Outpatient Medications:     albuterol (Ventolin HFA) 90 mcg/actuation inhaler, Inhale 2 puffs if needed for wheezing or shortness of breath (every 4 to 6 hours as needed)., Disp: 18 g, Rfl: 2    ALBUTEROL INHL, 2 puffs, Inhalation, [id number], PRN PRN for wheezing, # 18 g, Refill(s) 2, Date: 10/12/2023 14:11:00 EDT, Pharmacy: [id number], 2 puffs Inhalation [id number],PRN:for wheezing, 154.94, 09/26/2023 19:59:00 EDT, Height/Length Dosing, cm, 86.8, 07/30/2023..., Disp: , Rfl:     buPROPion XL (Wellbutrin XL) 300 mg 24 hr tablet, Take 1 tablet (300 mg) by mouth once daily in the morning. Do not crush, chew, or split., Disp: 90 tablet, Rfl: 0    diphenoxylate-atropine (Lomotil) 2.5-0.025 mg tablet, Take 1 tablet by mouth 4 times a day as needed for diarrhea., Disp: 30 tablet,  Rfl: 2    estradiol (Estrace) 0.01 % (0.1 mg/gram) vaginal cream, Apply daily 1 gram (large pea) for 3 weeks, then 3 times per week., Disp: 42.5 g, Rfl: 5    fosfomycin (Monurol) 3 gram packet, 3 gram every 72 hrs up to 3 doses total (Patient taking differently: 3 gram every 72 hrs up to 3 doses total  for uti), Disp: 9 g, Rfl: 0    LORazepam (Ativan) 1 mg tablet, 1 tablet each morning and 1/2 tablet each evening ., Disp: 45 tablet, Rfl: 2    omeprazole (PriLOSEC) 20 mg DR capsule, Take 1 capsule (20 mg) by mouth once daily in the morning. Take before meals., Disp: , Rfl:     ondansetron (Zofran) 8 mg tablet, Take 1 tablet (8 mg) by mouth every 8 hours if needed for nausea or vomiting., Disp: 30 tablet, Rfl: 2    peppermint oiL liquid, Apply topically., Disp: , Rfl:     prochlorperazine (Compazine) 10 mg tablet, Take 1 tablet (10 mg) by mouth every 6 hours if needed for nausea or vomiting., Disp: 30 tablet, Rfl: 2    psyllium seed, with sugar, (METAMUCIL, SUGAR, ORAL), ORAL, Refill(s) 0, Date: 07/08/2021 15:08:00 EDT, Disp: , Rfl:     sertraline (Zoloft) 25 mg tablet, Take 1 tablet (25 mg) by mouth once daily., Disp: 30 tablet, Rfl: 2    solifenacin (VESIcare) 10 mg tablet, Take 1 tablet (10 mg) by mouth once daily. Swallow tablet whole; do not crush, chew, or split., Disp: 30 tablet, Rfl: 5  Medical History:  Past Medical History:   Diagnosis Date    Agoraphobia, unspecified 12/06/2016    Agoraphobia    Body mass index (BMI) 33.0-33.9, adult     BMI 33.0-33.9,adult    Chronic sinusitis, unspecified     Recurrent sinus infections    Coronary artery disease     Cutaneous abscess, unspecified 06/01/2016    Abscess    Disruption of external operation (surgical) wound, not elsewhere classified, initial encounter 07/31/2017    Open abdominal incision with drainage    Diverticulitis of intestine, part unspecified, without perforation or abscess without bleeding 08/13/2018    Acute diverticulitis    Encounter for  immunization 04/21/2021    Encounter for immunization    Encounter for screening for lipoid disorders 04/26/2016    Screening cholesterol level    Hypercholesteremia     Hypertension     IgG deficiency (Multi) 05/03/2024    Incisional hernia without obstruction or gangrene 09/11/2015    Incisional hernia    Infection following a procedure, other surgical site, initial encounter 04/20/2017    Incisional infection    Local infection of the skin and subcutaneous tissue, unspecified 05/01/2019    Skin infection    Other conditions influencing health status     Complete Colonoscopy    Other specified postprocedural states 05/15/2018    History of incision and drainage    Otitis media, unspecified, left ear 05/19/2014    Otitis media, left    Pain in right knee 02/16/2015    Bilateral knee pain    Personal history of other diseases of the circulatory system     History of hypertension    Personal history of other infectious and parasitic diseases 07/03/2018    History of onychomycosis    Personal history of other infectious and parasitic diseases 10/18/2019    History of Clostridioides difficile colitis    Personal history of other specified conditions 04/20/2017    History of dysuria    Personal history of other specified conditions 02/02/2017    History of nocturia    Personal history of other specified conditions 10/18/2019    History of diarrhea    Personal history of other specified conditions 12/06/2019    History of dysuria    Personal history of other specified conditions 02/16/2015    History of dyspnea    Personal history of urinary (tract) infections 12/11/2019    History of urinary tract infection    Squamous cell carcinoma of lower lobe of right lung (Multi) 01/24/2024    Syncope and collapse 01/05/2018    Collapse    Tinnitus, left ear 08/29/2014    Tinnitus of left ear    Unspecified open wound of abdominal wall, unspecified quadrant without penetration into peritoneal cavity, initial encounter 08/23/2019     Wound, open, abdominal wall, anterior     Surgical History:  Past Surgical History:   Procedure Laterality Date    CARPAL TUNNEL RELEASE  2014    Neuroplasty Decompression Median Nerve At Carpal Tunnel    CHOLECYSTECTOMY  2014    Cholecystectomy    COLONOSCOPY  2021    Complete Colonoscopy    CT GUIDED ASPIRATION OF ABSCESS, HEMATOMA, CYST  2023    CT GUIDED ASPIRATION OF ABSCESS, HEMATOMA, CYST 2023 PAR CT    CT GUIDED PERCUTANEOUS BIOPSY LUNG  2023    CT GUIDED PERCUTANEOUS BIOPSY LUNG 2023 PAR CT    HYSTERECTOMY  2014    Hysterectomy    KNEE ARTHROSCOPY W/ DEBRIDEMENT  2015    Knee Arthroscopy (Therapeutic)    OTHER SURGICAL HISTORY  2019    Incisional Hernia Repair    RECTAL VAGINAL FISTULECTOMY  2014    Rectocele Repair     Family History:  Family History   Problem Relation Name Age of Onset    Hypertension Mother      Other (cardiac disorder) Father      Heart attack Maternal Grandfather       Social History:  Social History     Socioeconomic History    Marital status:      Spouse name: Not on file    Number of children: Not on file    Years of education: Not on file    Highest education level: Not on file   Occupational History    Not on file   Tobacco Use    Smoking status: Former     Current packs/day: 0.00     Average packs/day: 1 pack/day for 62.0 years (62.0 ttl pk-yrs)     Types: Cigarettes     Start date: 1961     Quit date: 2023     Years since quittin.9    Smokeless tobacco: Not on file   Substance and Sexual Activity    Alcohol use: Not Currently    Drug use: Never    Sexual activity: Not on file   Other Topics Concern    Not on file   Social History Narrative    Not on file     Social Determinants of Health     Financial Resource Strain: High Risk (2023)    Overall Financial Resource Strain (CARDIA)     Difficulty of Paying Living Expenses: Hard   Food Insecurity: Not on file   Transportation Needs: No  Transportation Needs (11/25/2023)    PRAPARE - Transportation     Lack of Transportation (Medical): No     Lack of Transportation (Non-Medical): No   Physical Activity: Not on file   Stress: Not on file   Social Connections: Not on file   Intimate Partner Violence: Not on file   Housing Stability: Low Risk  (11/25/2023)    Housing Stability Vital Sign     Unable to Pay for Housing in the Last Year: No     Number of Places Lived in the Last Year: 1     Unstable Housing in the Last Year: No     Vitals:  There were no vitals filed for this visit.    Maday Wu, APRN-CNS

## 2024-09-26 NOTE — PROGRESS NOTES
Radiation Oncology Follow-Up    Patient Name:  Janee Maddox  MRN:  [id number]  :  1950    Referring Provider: Maribel Arnold, APR*  Primary Care Provider: Frances Cervantes MD  Care Team: Patient Care Team:  Frances Cervantes MD as PCP - General (Internal Medicine)  Frances Cervantes MD as PCP - [employer] PCP  Prasanth Gonsales MD as Consulting Physician (Hematology and Oncology)  Shalom Wen MD as Consulting Physician (Hematology and Oncology)  Kenia Jackson RN as Registered Nurse (Hematology and Oncology)  HATTIE Kang-CNS as Nurse Practitioner (Geriatric Psychiatry)      Date of Service: 2024    HEMATOLOGY ONCOLOGY PROBLEMS:  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  TP53 mutated.        a. Ist line therapy with carboplatin/Taxol/Keytruda X 4 cycles from Feb to May 2024.        b. F/U PET scan from May 2024 with excellent response.          c. S/p stereotactic radiation to the brain lesion in 2024.        d.  Maintenance immunotherapy with Keytruda beginning May 2024    HISTORY:   74 y.o. female with right lung squamous cell carcinoma.  She is a lifelong smoker and was admitted at [facility] in 2023 with complaints of shortness of breath, chest discomfort and positive bacteremia.  During the evaluation she was noted to have a large cavitary lesion in the right lung along with pleural effusion.  CT scan at that time showed a 6.1 x 5 cm right lower lobe cavitary lung mass along with loculated right-sided pleural effusion and prominent mediastinal and hilar lymphadenopathy.  Overall findings were concerning for either abscess or baseline malignancy.       She was transferred to [facility] where a CT-guided biopsy confirmed invasive squamous cell carcinoma.  PD-L1 expression was 5%.  Tumor NGS panel was mainly suggestive of TP53 mutation.  During hospitalization her clinical course was also complicated by Pantoea  bacteremia and an ESBL Ecoli UTI.  She was treated with antibiotics and other supportive care.       A follow-up PET scan from Jan 2024 confirmed increased metabolic activity in the right lower lobe pleural-based cavitary mass along with mediastinal and right hilar lymphadenopathy and right-sided pleural disease consistent with known malignancy.  There was no evidence of distant metastatic disease.       Brain MRI showed small solitary left parietal metastatic lesion and she is s/p stereotactic radiation treatment in March 2024 to 3 lesions.      She was treated with first-line therapy with carbo/Taxol/Keytruda  X 4 cycles from Feb to May 2024.  Treatment course was complicated by recurrent UTI and other issues.  Posttreatment follow-up PET scan from May 2024 showed excellent response.  She is currently being treated with maintenance immunotherapy with Keytruda since May 2024.    SRS: Not Applicable Brain    Treatment Period Technique Fraction Dose Fractions Total Dose   Course 1 3/19/2024-3/19/2024  (days elapsed: 0)         A:LF62 3/19/2024-3/19/2024 Gamma-Knife 2200 / 2,200 cGy 1 / 1 2200 / 2,200 cGy         B: 3/19/2024-3/19/2024 Gamma-Knife 2200 / 2,200 cGy 1 / 1 2200 / 2,200 cGy         C:LP97 3/19/2024-3/19/2024 Gamma-Knife 2200 / 2,200 cGy 1 / 1 2200 / 2,200 cGy     SUBJECTIVE  History of Present Illness (Virtual visit with the patient at her home with me at the [facility]):   Janee Maddox is a 74 y.o. female who was previously seen at the [facility] Department of Radiation Oncology for metastatic 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  Today the patient is in clinic for a routine Radiation Oncology FU visit and review of an MRI completed on 9/25/25.  Per a Radiology read, there is no evidence of mass, infarction or hemorrhage.  Today the patient states that she's doing okay.  She states that she's been having some intermittent dizziness.  She  does have a remote history of vertigo but hasn't had it for many years now.  She will be discussing with Dr. Gonsales at next weeks visit.  She also reports of some generalized weakness in her b/l legs which have been stable.  Denies headaches, vision changes, or new focal sensory/motor deficits.  Endorses being recently treated for CDIFF with Vancomycin.  Continues to be treated systemically with Pembrolizumab under the direction of Dru.           Review of Systems:   Review of Systems   Constitutional:  Negative for chills, fatigue and fever.   HENT:   Negative for hearing loss and trouble swallowing.    Eyes:  Negative for eye problems.   Respiratory:  Negative for chest tightness, cough, shortness of breath and wheezing.    Cardiovascular:  Negative for chest pain and leg swelling.   Gastrointestinal:  Negative for abdominal distention, abdominal pain, constipation, diarrhea, nausea and vomiting.   Genitourinary:  Negative for difficulty urinating and hematuria.    Musculoskeletal:  Negative for arthralgias, back pain, neck pain and neck stiffness.   Skin:  Negative for rash.   Neurological:  Negative for dizziness, headaches, light-headedness, seizures and speech difficulty.   Hematological:  Negative for adenopathy.   Psychiatric/Behavioral:  Negative for confusion and depression. The patient is not nervous/anxious.        Performance Status:   The Karnofsky performance scale today is 100, Fully active, able to carry on all pre-disease performed without restriction (ECOG equivalent 0).    Pain: Patient doesn't c/o pain today.     OBJECTIVE  Vital Signs:      7/10/2024     1:50 PM 7/10/2024     2:15 PM 7/18/2024     1:07 PM 7/18/2024     3:00 PM 8/21/2024    11:45 AM 8/21/2024     1:45 PM 9/25/2024    12:00 PM   Vitals   Systolic 130 118 138 142 104 124    Diastolic 71 68 74 64 69 64    Heart Rate 83 84 90 81 80 75    Temp 36.7 °C (98.1 °F) 36.7 °C (98.1 °F) 35.8 °C (96.4 °F) 36.2 °C (97.2 °F) 36.7 °C (98.1  "°F) 36.5 °C (97.7 °F)    Resp 18 20 18 18 20 18    Height (in)     1.592 m (5' 2.68\")   1.549 m (5' 1\")   Weight (lb)     152.78  145   BMI     27.34 kg/m2  27.4 kg/m2   BSA (m2)     1.75 m2  1.68 m2   Visit Report Report Report   Report Report        Significant value      Physical Exam    MR BRAIN W AND WO IV CONTRAST; 9/25/2024 12:45 pm   IMPRESSION:  There is no evidence of mass, infarction or hemorrhage    ASSESSMENT:   74 y.o. female who was previously seen at the [facility] Department of Radiation Oncology for metastatic 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  An MRI of the brain completed on 9/25/24 shows LIVIA.  We discussed completing an MRI in 3 months and the patient was amenable.  All questions/concerns were addressed to the satisfaction of the patient.     PLAN:      --MRI in 3 months.   --FU visit with Mamadou Rothman CNP in 3 months.   --Continue to follow with Dr. Gonsales for Medical Oncology management.     Please reach out with any questions or concerns.   NCCN Guidelines were applicable to guide this patients treatment plan.  HATTIE Krause-CNP     "

## 2024-10-01 ENCOUNTER — APPOINTMENT (OUTPATIENT)
Dept: HEMATOLOGY/ONCOLOGY | Facility: CLINIC | Age: 74
End: 2024-10-01
Payer: MEDICARE

## 2024-10-02 ENCOUNTER — SOCIAL WORK (OUTPATIENT)
Dept: HEMATOLOGY/ONCOLOGY | Facility: CLINIC | Age: 74
End: 2024-10-02

## 2024-10-02 ENCOUNTER — INFUSION (OUTPATIENT)
Dept: HEMATOLOGY/ONCOLOGY | Facility: CLINIC | Age: 74
End: 2024-10-02
Payer: MEDICARE

## 2024-10-02 ENCOUNTER — OFFICE VISIT (OUTPATIENT)
Dept: HEMATOLOGY/ONCOLOGY | Facility: CLINIC | Age: 74
End: 2024-10-02
Payer: MEDICARE

## 2024-10-02 VITALS
WEIGHT: 149.91 LBS | RESPIRATION RATE: 20 BRPM | OXYGEN SATURATION: 100 % | BODY MASS INDEX: 28.33 KG/M2 | HEART RATE: 90 BPM | TEMPERATURE: 97.3 F | DIASTOLIC BLOOD PRESSURE: 70 MMHG | SYSTOLIC BLOOD PRESSURE: 121 MMHG

## 2024-10-02 VITALS
OXYGEN SATURATION: 95 % | SYSTOLIC BLOOD PRESSURE: 113 MMHG | DIASTOLIC BLOOD PRESSURE: 61 MMHG | TEMPERATURE: 97.7 F | HEART RATE: 78 BPM | RESPIRATION RATE: 18 BRPM

## 2024-10-02 DIAGNOSIS — C34.31 SQUAMOUS CELL CARCINOMA OF LOWER LOBE OF RIGHT LUNG (MULTI): ICD-10-CM

## 2024-10-02 DIAGNOSIS — C34.11 SQUAMOUS CELL CARCINOMA OF UPPER LOBE OF RIGHT LUNG (MULTI): ICD-10-CM

## 2024-10-02 DIAGNOSIS — C79.31 MALIGNANT NEOPLASM METASTATIC TO BRAIN (MULTI): ICD-10-CM

## 2024-10-02 DIAGNOSIS — C34.31 SQUAMOUS CELL CARCINOMA OF LOWER LOBE OF RIGHT LUNG (MULTI): Primary | ICD-10-CM

## 2024-10-02 DIAGNOSIS — R30.0 DYSURIA: Primary | ICD-10-CM

## 2024-10-02 LAB
ALBUMIN SERPL BCP-MCNC: 4.1 G/DL (ref 3.4–5)
ALP SERPL-CCNC: 48 U/L (ref 33–136)
ALT SERPL W P-5'-P-CCNC: 12 U/L (ref 7–45)
ANION GAP SERPL CALC-SCNC: 12 MMOL/L (ref 10–20)
APPEARANCE UR: ABNORMAL
AST SERPL W P-5'-P-CCNC: 13 U/L (ref 9–39)
BACTERIA #/AREA URNS AUTO: ABNORMAL /HPF
BASOPHILS # BLD AUTO: 0.08 X10*3/UL (ref 0–0.1)
BASOPHILS NFR BLD AUTO: 1.6 %
BILIRUB SERPL-MCNC: 0.4 MG/DL (ref 0–1.2)
BILIRUB UR STRIP.AUTO-MCNC: ABNORMAL MG/DL
BUN SERPL-MCNC: 18 MG/DL (ref 6–23)
CALCIUM SERPL-MCNC: 9.4 MG/DL (ref 8.6–10.3)
CHLORIDE SERPL-SCNC: 104 MMOL/L (ref 98–107)
CO2 SERPL-SCNC: 27 MMOL/L (ref 21–32)
COLOR UR: ABNORMAL
CREAT SERPL-MCNC: 1.59 MG/DL (ref 0.5–1.05)
EGFRCR SERPLBLD CKD-EPI 2021: 34 ML/MIN/1.73M*2
EOSINOPHIL # BLD AUTO: 0.55 X10*3/UL (ref 0–0.4)
EOSINOPHIL NFR BLD AUTO: 10.7 %
ERYTHROCYTE [DISTWIDTH] IN BLOOD BY AUTOMATED COUNT: 15.9 % (ref 11.5–14.5)
GLUCOSE SERPL-MCNC: 128 MG/DL (ref 74–99)
GLUCOSE UR STRIP.AUTO-MCNC: NORMAL MG/DL
HCT VFR BLD AUTO: 33.5 % (ref 36–46)
HGB BLD-MCNC: 10.2 G/DL (ref 12–16)
IMM GRANULOCYTES # BLD AUTO: 0.01 X10*3/UL (ref 0–0.5)
IMM GRANULOCYTES NFR BLD AUTO: 0.2 % (ref 0–0.9)
KETONES UR STRIP.AUTO-MCNC: NEGATIVE MG/DL
LEUKOCYTE ESTERASE UR QL STRIP.AUTO: ABNORMAL
LYMPHOCYTES # BLD AUTO: 1.12 X10*3/UL (ref 0.8–3)
LYMPHOCYTES NFR BLD AUTO: 21.9 %
MCH RBC QN AUTO: 28.8 PG (ref 26–34)
MCHC RBC AUTO-ENTMCNC: 30.4 G/DL (ref 32–36)
MCV RBC AUTO: 95 FL (ref 80–100)
MONOCYTES # BLD AUTO: 0.34 X10*3/UL (ref 0.05–0.8)
MONOCYTES NFR BLD AUTO: 6.6 %
MUCOUS THREADS #/AREA URNS AUTO: ABNORMAL /LPF
NEUTROPHILS # BLD AUTO: 3.02 X10*3/UL (ref 1.6–5.5)
NEUTROPHILS NFR BLD AUTO: 59 %
NITRITE UR QL STRIP.AUTO: ABNORMAL
NRBC BLD-RTO: 0 /100 WBCS (ref 0–0)
PH UR STRIP.AUTO: 6.5 [PH]
PLATELET # BLD AUTO: 170 X10*3/UL (ref 150–450)
POTASSIUM SERPL-SCNC: 4.4 MMOL/L (ref 3.5–5.3)
PROT SERPL-MCNC: 6.7 G/DL (ref 6.4–8.2)
PROT UR STRIP.AUTO-MCNC: ABNORMAL MG/DL
RBC # BLD AUTO: 3.54 X10*6/UL (ref 4–5.2)
RBC # UR STRIP.AUTO: ABNORMAL /UL
RBC #/AREA URNS AUTO: >20 /HPF
SODIUM SERPL-SCNC: 139 MMOL/L (ref 136–145)
SP GR UR STRIP.AUTO: 1.01
SQUAMOUS #/AREA URNS AUTO: ABNORMAL /HPF
TSH SERPL-ACNC: 1.41 MIU/L (ref 0.44–3.98)
UROBILINOGEN UR STRIP.AUTO-MCNC: ABNORMAL MG/DL
WBC # BLD AUTO: 5.1 X10*3/UL (ref 4.4–11.3)
WBC #/AREA URNS AUTO: >50 /HPF
WBC CLUMPS #/AREA URNS AUTO: ABNORMAL /HPF

## 2024-10-02 PROCEDURE — 81003 URINALYSIS AUTO W/O SCOPE: CPT

## 2024-10-02 PROCEDURE — 99215 OFFICE O/P EST HI 40 MIN: CPT | Performed by: INTERNAL MEDICINE

## 2024-10-02 PROCEDURE — 1125F AMNT PAIN NOTED PAIN PRSNT: CPT | Performed by: INTERNAL MEDICINE

## 2024-10-02 PROCEDURE — 1159F MED LIST DOCD IN RCRD: CPT | Performed by: INTERNAL MEDICINE

## 2024-10-02 PROCEDURE — 99215 OFFICE O/P EST HI 40 MIN: CPT | Mod: 25 | Performed by: INTERNAL MEDICINE

## 2024-10-02 PROCEDURE — 85025 COMPLETE CBC W/AUTO DIFF WBC: CPT

## 2024-10-02 PROCEDURE — 84075 ASSAY ALKALINE PHOSPHATASE: CPT

## 2024-10-02 PROCEDURE — 84443 ASSAY THYROID STIM HORMONE: CPT

## 2024-10-02 PROCEDURE — 96413 CHEMO IV INFUSION 1 HR: CPT

## 2024-10-02 PROCEDURE — 2500000004 HC RX 250 GENERAL PHARMACY W/ HCPCS (ALT 636 FOR OP/ED): Mod: JZ | Performed by: INTERNAL MEDICINE

## 2024-10-02 PROCEDURE — 87086 URINE CULTURE/COLONY COUNT: CPT | Mod: PARLAB

## 2024-10-02 RX ORDER — FAMOTIDINE 10 MG/ML
20 INJECTION INTRAVENOUS ONCE AS NEEDED
Status: CANCELLED | OUTPATIENT
Start: 2024-10-02

## 2024-10-02 RX ORDER — PROCHLORPERAZINE EDISYLATE 5 MG/ML
10 INJECTION INTRAMUSCULAR; INTRAVENOUS EVERY 6 HOURS PRN
Status: CANCELLED | OUTPATIENT
Start: 2024-10-02

## 2024-10-02 RX ORDER — HEPARIN SODIUM,PORCINE/PF 10 UNIT/ML
50 SYRINGE (ML) INTRAVENOUS AS NEEDED
Status: DISCONTINUED | OUTPATIENT
Start: 2024-10-02 | End: 2024-10-02 | Stop reason: HOSPADM

## 2024-10-02 RX ORDER — FAMOTIDINE 10 MG/ML
20 INJECTION INTRAVENOUS ONCE AS NEEDED
Status: DISCONTINUED | OUTPATIENT
Start: 2024-10-02 | End: 2024-10-02 | Stop reason: HOSPADM

## 2024-10-02 RX ORDER — DIPHENHYDRAMINE HYDROCHLORIDE 50 MG/ML
50 INJECTION INTRAMUSCULAR; INTRAVENOUS AS NEEDED
Status: CANCELLED | OUTPATIENT
Start: 2024-10-02

## 2024-10-02 RX ORDER — HEPARIN 100 UNIT/ML
500 SYRINGE INTRAVENOUS AS NEEDED
OUTPATIENT
Start: 2024-10-02

## 2024-10-02 RX ORDER — EPINEPHRINE 0.3 MG/.3ML
0.3 INJECTION SUBCUTANEOUS EVERY 5 MIN PRN
Status: CANCELLED | OUTPATIENT
Start: 2024-10-02

## 2024-10-02 RX ORDER — HEPARIN SODIUM,PORCINE/PF 10 UNIT/ML
50 SYRINGE (ML) INTRAVENOUS AS NEEDED
OUTPATIENT
Start: 2024-10-02

## 2024-10-02 RX ORDER — ALBUTEROL SULFATE 0.83 MG/ML
3 SOLUTION RESPIRATORY (INHALATION) AS NEEDED
Status: DISCONTINUED | OUTPATIENT
Start: 2024-10-02 | End: 2024-10-02 | Stop reason: HOSPADM

## 2024-10-02 RX ORDER — DIPHENHYDRAMINE HYDROCHLORIDE 50 MG/ML
50 INJECTION INTRAMUSCULAR; INTRAVENOUS AS NEEDED
Status: DISCONTINUED | OUTPATIENT
Start: 2024-10-02 | End: 2024-10-02 | Stop reason: HOSPADM

## 2024-10-02 RX ORDER — PROCHLORPERAZINE MALEATE 5 MG
10 TABLET ORAL EVERY 6 HOURS PRN
Status: CANCELLED | OUTPATIENT
Start: 2024-10-02

## 2024-10-02 RX ORDER — EPINEPHRINE 0.3 MG/.3ML
0.3 INJECTION SUBCUTANEOUS EVERY 5 MIN PRN
Status: DISCONTINUED | OUTPATIENT
Start: 2024-10-02 | End: 2024-10-02 | Stop reason: HOSPADM

## 2024-10-02 RX ORDER — ALBUTEROL SULFATE 0.83 MG/ML
3 SOLUTION RESPIRATORY (INHALATION) AS NEEDED
Status: CANCELLED | OUTPATIENT
Start: 2024-10-02

## 2024-10-02 RX ORDER — HEPARIN 100 UNIT/ML
500 SYRINGE INTRAVENOUS AS NEEDED
Status: DISCONTINUED | OUTPATIENT
Start: 2024-10-02 | End: 2024-10-02 | Stop reason: HOSPADM

## 2024-10-02 ASSESSMENT — PAIN SCALES - GENERAL
PAINLEVEL_OUTOF10: 0-NO PAIN
PAINLEVEL: 6

## 2024-10-02 NOTE — PROGRESS NOTES
Followed up with pt. Pt here today for keytruda treatment. Pt is up ambulating and seems to be managing well. Pt had been at home taking iv medications for her C Diff. Pt indicates she is having constant UTIs and they are starting to limit her life. She takes medication for it to resolve and then it does for a short time and it keeps coming back. Pt has taken so many antibiotics she indicates that is why she got the Cdiff. Pt also explains she is not able to take some antibiotics now because she is resistant to them. Pt frustrated by this because it is limiting her ability to go out and do things she would like. She really wants to be on a bus trip in Dec but does not feel comfortable dealing with these issues while away from home. Pt will be seeing the urologist tomorrow. Discussed speaking to MD about looking at possibilities since her hernia surgery pt spoke about and if anything anatomically happened after that surgery to cause this. Provided support and listened.

## 2024-10-02 NOTE — PROGRESS NOTES
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  TP53 mutated.        a. Ist line therapy with carboplatin/Taxol/Keytruda X 4 cycles from Feb to May 2024.        b. F/U PET scan from May 2024 with excellent response.          c. S/p stereotactic radiation to the brain lesion in March 2024.        d.  Maintenance immunotherapy with Keytruda beginning May 2024    CHIEF COMPLAINTS:  Patient is in the clinic today for evaluation of right lung squamous cell carcinoma and for continuation of the therapy and management of therapy-related effects.    HISTORY:  Janee Maddox is a 74 y.o. female with right lung squamous cell carcinoma.  She is a lifelong smoker and was admitted at [facility] in Nov 2023 with complaints of shortness of breath, chest discomfort and positive bacteremia.  During the evaluation she was noted to have a large cavitary lesion in the right lung along with pleural effusion.  CT scan at that time showed a 6.1 x 5 cm right lower lobe cavitary lung mass along with loculated right-sided pleural effusion and prominent mediastinal and hilar lymphadenopathy.  Overall findings were concerning for either abscess or baseline malignancy.  She was transferred to [facility] where a CT-guided biopsy confirmed invasive squamous cell carcinoma.  PD-L1 expression was 5%.  Tumor NGS panel was mainly suggestive of TP53 mutation.  During hospitalization her clinical course was also complicated by Pantoea bacteremia and an ESBL Ecoli UTI.  She was treated with antibiotics and other supportive care.  A follow-up PET scan from Jan 2024 confirmed increased metabolic activity in the right lower lobe pleural-based cavitary mass along with mediastinal and right hilar lymphadenopathy and right-sided pleural disease  consistent with known malignancy.  There was no evidence of distant metastatic disease.  Brain MRI showed small solitary left parietal metastatic lesion and she is s/p stereotactic radiation treatment in 2024.  She was treated with first-line therapy with carbo/Taxol/Keytruda  X 4 cycles from Feb to May 2024.  Treatment course was complicated by recurrent UTI and other issues.  Posttreatment follow-up PET scan from May 2024 showed excellent response.  She is currently being treated with maintenance immunotherapy with Keytruda since May 2024.    INTERVAL HISTORY:  She is tolerating immunotherapy well. Diarrhea is improved after being treated for C diff.  As stated in the last note, follow-up PET scan and brain MRI showed excellent response. Multiple other chronic symptomatology and chronic issues with intermittent UTIs.    PAST MEDICAL HISTORY:  1.  Right lower lobe squamous cell lung cancer as detailed above.  2.  Coronary artery disease  3.  Hypertension  4.  Hyperlipidemia  5.  GERD  6.  Anxiety/depression  7.  History of C. difficile colitis  8.  E. coli UTI.  9.  History of complete hysterectomy, cholecystectomy and incisional hernia surgeries    SOCIAL HISTORY:  She lives alone in [place].  Quit smoking in 2023.  1 pack a day for 60 years prior smoking history.  Admit to occasional alcohol intake.  She work as a tax preparor.  Born and raised in [place].    FAMILY HISTORY:  Father  at age 86 from coronary artery disease. Mother also  at age 86 from multiple medical issues.  She had 1 sister and 4/2 siblings.  One of them  from myocardial infarction.  3 children, 7 grandkids and 1 great grand kid ~ all alive and well.  No other family history of bleeding, clotting or malignant disorders in the family history.    REVIEW OF SYSTEMS:   Pertinent finding as per the history above.  All other systems have been reviewed and generally negative and noncontributory.    VITAL SIGNS  BSA: 1.71  meters squared  /70   Pulse 90   Temp 36.3 °C (97.3 °F)   Resp 20   Wt 68 kg (149 lb 14.6 oz)   SpO2 100%   BMI 28.33 kg/m²     PHYSICAL EXAMINATION:  Detailed physical examination not done.    LAB DATA:  CBC from today shows a hemoglobin of 10.2 with MCV of 96.  White cell count is normal at 3.4 and platelets are 189K.  Metabolic profile and TSH are pending.  Last 3 sets of blood work were reviewed and the trend was noted.    RADIOLOGICAL DATA:  Brain MRI 09/26/2024   Impression:  There is no evidence of mass, infarction or hemorrhage.    PET scan 5/23/2024   Impression:  1.  Significantly decreased size and metabolic activity within a cavitary right lower lobe lung mass invading the right chest wall with residual moderate hypermetabolic activity compatible with residual viable disease. No new hypermetabolic pulmonary nodules.  2. Significant interval decrease in size and metabolic activity with mediastinal lymphadenopathy with residual metabolic activity concerning for residual viable disease. No new hypermetabolic  lymphadenopathy within the chest, abdomen, or pelvis.  3. Interval development of focal hypermetabolic activity at the right sternoclavicular joint favored to be degenerative in nature. No evidence of hypermetabolic osseous metastatic disease throughout the remainder of the body.     PATHOLOGY DATA:  Surgical Pathology Exam: [id number]   FINAL DIAGNOSIS   A. LUNG, RIGHT; BIOPSY:    -- INVASIVE SQUAMOUS CELL CARCINOMA, KERATINIZING. See comment   Electronically signed by Lino Carrillo MD on 12/1/2023      PD-L1 22C3  (KEYTRUDA)Tumor Proportion Score (TPS): 5%   MICROSATELLITE STATUS: Microsatellite Instability-High (MSI-H) is NOT DETECTED.  DISEASE ASSOCIATED GENOMIC FINDINGS:   TP53 p.G245V ([id number] c.734G>T)  DISEASE RELEVANT ALTERATIONS NOT DETECTED:   Negative for ALK fusion.  Negative for BRAF V600E.  Negative for EGFR sensitizing mutation.  Negative for ERBB2 activating  mutation  Negative for KRAS G12C.  Negative for MET exon 14 skipping mutation.  Negative for NTRK fusion.  Negative for RET fusion.  Negative for ROS1 fusion.    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  TP53 mutated.  Please refer  to the details of initial presentation and management as outlined above.  In summary she is a lifelong smoker and was noted to have a large cavitary right lower lung pleural-based lesion along with extensive mediastinal and hilar lymphadenopathy and loculated pleural effusion in November 2023.  CT-guided biopsy was confirmatory of invasive squamous cell carcinoma.  PD-L1 TPS score was 5%.  NGS panel was unremarkable other than finding of TP53 mutation.  Brain MRI showed a small 3 mm left frontal lobe lesion concerning for metastatic disease.  She received stereotactic brain radiation in March 2024 and was treated with first-line therapy with carbo/Taxol/Keytruda X 4 cycles from Feb to May 2024.  Treatment course was complicated by recurrent UTI and other issues.  Posttreatment follow-up PET scan from May 2024 showed excellent response.  She is currently being treated with maintenance immunotherapy with Keytruda since May 2024.    She will continue with maintenance immunotherapy with Keytruda at 6 weekly interval.  Probable benefit and side effect profile of mainly  weakness, fatigue, colitis, pneumonitis, endocrinopathies, transaminitis etc. were explained to them in detail.  Her overall prognosis remain guarded.    2.  Brain mets.  MRI results were suggestive of small solitary left parietal lobe lesion.  She is s/p stereotactic radiation therapy in Mar 2024. She will continue to F/U with radiation oncology team.    3.  Recurrent UTI.  As stated above she again had a prolonged hospitalization at [facility] with E. coli UTI in April 2024.  As per  Dr. Schroeder (infectious disease) suggestion, she was treated with monthly IgG infusion.  She has been  treated with intermittent regular infusional antibiotics.    4.  Diarrhea.  She has been treated for C diff colitis with improvement in diarrhea.  She will continue to follow up with GI team.    5.  Follow-up.  Follow-up with me in 6 weeks. She will be scheduled for F/U PET scan just prior to next OV.  In the meantime she will come to the clinic for continuation of the treatment as per the protocol.      This note has been transcribed using Dragon voice recognition system and there is a possibility of unintentional typing misprints.

## 2024-10-02 NOTE — SIGNIFICANT EVENT

## 2024-10-03 ENCOUNTER — APPOINTMENT (OUTPATIENT)
Dept: UROLOGY | Facility: CLINIC | Age: 74
End: 2024-10-03
Payer: MEDICARE

## 2024-10-03 VITALS
HEART RATE: 88 BPM | TEMPERATURE: 97.3 F | WEIGHT: 149 LBS | BODY MASS INDEX: 28.13 KG/M2 | HEIGHT: 61 IN | DIASTOLIC BLOOD PRESSURE: 60 MMHG | SYSTOLIC BLOOD PRESSURE: 101 MMHG

## 2024-10-03 DIAGNOSIS — N95.8 GENITOURINARY SYNDROME OF MENOPAUSE: ICD-10-CM

## 2024-10-03 DIAGNOSIS — N39.44 NOCTURNAL ENURESIS: ICD-10-CM

## 2024-10-03 DIAGNOSIS — N39.0 RECURRENT UTI: Primary | ICD-10-CM

## 2024-10-03 DIAGNOSIS — R35.0 FREQUENCY OF URINATION: ICD-10-CM

## 2024-10-03 DIAGNOSIS — N30.00 ACUTE CYSTITIS WITHOUT HEMATURIA: ICD-10-CM

## 2024-10-03 PROCEDURE — 1159F MED LIST DOCD IN RCRD: CPT | Performed by: NURSE PRACTITIONER

## 2024-10-03 PROCEDURE — 99214 OFFICE O/P EST MOD 30 MIN: CPT | Performed by: NURSE PRACTITIONER

## 2024-10-03 PROCEDURE — G2211 COMPLEX E/M VISIT ADD ON: HCPCS | Performed by: NURSE PRACTITIONER

## 2024-10-03 PROCEDURE — 51798 US URINE CAPACITY MEASURE: CPT | Performed by: NURSE PRACTITIONER

## 2024-10-03 NOTE — Clinical Note
Kaur Ware,  Patient struggling w chronic UTI and OAB, under care Dr. Schroeder, recent Cdiff; planning on getting IV antibiotics hopefully by Monday, she would like to see you at [facility], could you assist w her getting cysto there when finishing up the antibiotics, otherwise, will be difficult to schedule time when cleared up. I gave her U number to call.   Thanks, Elvira

## 2024-10-03 NOTE — PROGRESS NOTES
"10/03/24   [id number]     Recurrent UTI, OAB    Subjective      HPI Janee Maddox is a 74 y.o. female who presents for recurrent UTI, OAB, lung cancer;    Last seen 4/3/24 for UTI symptoms. Working w ID, Dr. Schroeder; who just treated her 3 weeks ago w vancomycin for Cdiff;     10/2/24 (yesterday) urine ordered by Dr. Schroeder micro wbc >50, rbc >20, 3+ bacteria, culture in process; hopes to have IV antibiotics started by Monday per patient;     patient scheduled this appt. With me for today as folllow up OAB, started her on solifenacin back in the spring;      Stopped the vaginal estrogen w cdiff (diarrhea 6 weeks); diarrhea resolved w vancomycin tx by GI;     hx OAB managed w solifenacin 10 mg daily, when she has UTI doesn't help, up every 45 min until back on antibiotics; but still up 3-4 x at night when no UTI;     10/2/24 Creatinine 1.59, GFR 34 wbc 5.1     November 2023, borderline septic, [facility] IV abx, then dx w lung cancer; difficult to clear up;     Finished chemotherapy May 2024, tumor has shrunk dramatically per patient, plans to be on Keytruda for     PVR 5 ml 4/3/24    7/18/24 Ecoli +urine culture ESBL  5/6/24 neg culture  4/19/24 Ecoli +urine culture ESBL  1/24/24 Ecoli + urine culture ESBL    Daughter Ronna Simpson [phone]    PMH, PSH, SH, FH reviewed.  PMH: diverticulitis, anxiety, depression  PSH: hernia repair 2015, rectal lift 1997, incisional hernia 1992, hysterectomy 1991  FH: HTN, Cardiac, MI  SH: 1 pack per day     Objective     /60   Pulse 88   Temp 36.3 °C (97.3 °F)   Ht 1.549 m (5' 1\")   Wt 67.6 kg (149 lb)   BMI 28.15 kg/m²        Physical Exam  General: Appears comfortable and in no apparent distress, well nourished  Head: Normocephalic, atraumatic  Neck: trachea midline  Respiratory: respirations unlabored, no wheezes, and no use of accessory muscles  Cardiovascular: at rest no dyspnea, well perfused  Skin: no visible rashes or lesions  Neurologic: grossly intact, oriented " to person, place, and time  Psychiatric: mood and affect appropriate  Musculoskeletal: in chair for appt. no difficulty w upper body movement      Assessment/Plan   Problem List Items Addressed This Visit          Genitourinary and Reproductive    Genitourinary syndrome of menopause    UTI (urinary tract infection)     Other Visit Diagnoses       Recurrent UTI    -  Primary    Frequency of urination        Nocturnal enuresis                No orders of the defined types were placed in this encounter.     Evaluation of recurrent UTIs, not clearing up  Call [phone] to schedule Renal ultrasound  Call [phone] to get appt. W Dr. Ware, closer to home, needs cystoscopy recurrent UTIs    Resume estrogen vaginal cream  Being treated for UTIs by Dr. Schroeder, chronic Ecoli and ESBL and Cdiff 3 weeks ago  Discussed immune system may be part of issue w treatment lung cancer    Solifenacin 10 mg daily  Discussed adding gemtesa, she would like to hold off and focus on UTIs first    Nurse line [phone],   Follow up Elvira 3 mos OAB    Elvira Suarez, APRN-CNP  Lab Results   Component Value Date    GLUCOSE 128 (H) 10/02/2024    CALCIUM 9.4 10/02/2024     10/02/2024    K 4.4 10/02/2024    CO2 27 10/02/2024     10/02/2024    BUN 18 10/02/2024    CREATININE 1.59 (H) 10/02/2024

## 2024-10-03 NOTE — PATIENT INSTRUCTIONS
Call [phone] to schedule Renal ultrasound  Call [phone] to get appt. W Dr. Ware, closer to home, needs cystoscopy recurrent UTIs    Resume estrogen vaginal cream  Being treated for UTIs by Dr. Schroeder, chronic Ecoli and ESBL and Cdiff 3 weeks ago  Discussed immune system may be part of issue w treatment lung cancer    Solifenacin 10 mg daily  Discussed adding gemtesa, would like to hold off and focus on UTIs first    Nurse line [phone]

## 2024-10-05 LAB — BACTERIA UR CULT: ABNORMAL

## 2024-10-07 ENCOUNTER — APPOINTMENT (OUTPATIENT)
Dept: HEMATOLOGY/ONCOLOGY | Facility: CLINIC | Age: 74
End: 2024-10-07
Payer: MEDICARE

## 2024-10-17 ENCOUNTER — HOSPITAL ENCOUNTER (OUTPATIENT)
Dept: RADIOLOGY | Facility: HOSPITAL | Age: 74
Discharge: HOME | End: 2024-10-17
Payer: MEDICARE

## 2024-10-17 DIAGNOSIS — N39.0 RECURRENT UTI: ICD-10-CM

## 2024-10-17 PROCEDURE — 76770 US EXAM ABDO BACK WALL COMP: CPT

## 2024-11-14 ENCOUNTER — HOSPITAL ENCOUNTER (OUTPATIENT)
Dept: RADIOLOGY | Facility: CLINIC | Age: 74
End: 2024-11-14
Payer: MEDICARE

## 2024-11-14 ENCOUNTER — HOSPITAL ENCOUNTER (OUTPATIENT)
Dept: RADIOLOGY | Facility: CLINIC | Age: 74
Discharge: HOME | End: 2024-11-14
Payer: MEDICARE

## 2024-11-14 ENCOUNTER — APPOINTMENT (OUTPATIENT)
Dept: RADIOLOGY | Facility: CLINIC | Age: 74
End: 2024-11-14
Payer: MEDICARE

## 2024-11-14 DIAGNOSIS — C34.31 SQUAMOUS CELL CARCINOMA OF LOWER LOBE OF RIGHT LUNG (MULTI): ICD-10-CM

## 2024-11-27 DIAGNOSIS — F32.5 DEPRESSION, MAJOR, IN REMISSION (CMS-HCC): ICD-10-CM

## 2024-11-27 DIAGNOSIS — F41.1 GAD (GENERALIZED ANXIETY DISORDER): ICD-10-CM

## 2024-11-27 RX ORDER — SERTRALINE HYDROCHLORIDE 25 MG/1
25 TABLET, FILM COATED ORAL DAILY
Qty: 30 TABLET | Refills: 0 | OUTPATIENT
Start: 2024-11-27

## 2024-12-19 ENCOUNTER — DOCUMENTATION (OUTPATIENT)
Dept: BEHAVIORAL HEALTH | Facility: CLINIC | Age: 74
End: 2024-12-19

## 2024-12-19 ENCOUNTER — APPOINTMENT (OUTPATIENT)
Dept: BEHAVIORAL HEALTH | Facility: CLINIC | Age: 74
End: 2024-12-19
Payer: MEDICARE

## 2024-12-19 DIAGNOSIS — F41.1 GAD (GENERALIZED ANXIETY DISORDER): ICD-10-CM

## 2024-12-19 DIAGNOSIS — F32.5 DEPRESSION, MAJOR, IN REMISSION (CMS-HCC): ICD-10-CM

## 2024-12-19 RX ORDER — BUPROPION HYDROCHLORIDE 300 MG/1
300 TABLET ORAL EVERY MORNING
Qty: 90 TABLET | Refills: 0 | Status: SHIPPED | OUTPATIENT
Start: 2024-12-19 | End: 2025-03-19

## 2024-12-19 NOTE — PROGRESS NOTES
Nonbillable note : reviewed med order history in the [facility] emr and sent bupropion xl order to pharmacy per request [facility]

## 2024-12-26 ENCOUNTER — HOSPITAL ENCOUNTER (OUTPATIENT)
Dept: RADIOLOGY | Facility: CLINIC | Age: 74
Discharge: HOME | End: 2024-12-26
Payer: MEDICARE

## 2024-12-26 ENCOUNTER — DOCUMENTATION (OUTPATIENT)
Dept: BEHAVIORAL HEALTH | Facility: CLINIC | Age: 74
End: 2024-12-26
Payer: MEDICARE

## 2024-12-26 DIAGNOSIS — F41.1 GAD (GENERALIZED ANXIETY DISORDER): ICD-10-CM

## 2024-12-26 DIAGNOSIS — F32.5 DEPRESSION, MAJOR, IN REMISSION (CMS-HCC): ICD-10-CM

## 2024-12-26 PROCEDURE — 3430000001 HC RX 343 DIAGNOSTIC RADIOPHARMACEUTICALS: Performed by: INTERNAL MEDICINE

## 2024-12-26 PROCEDURE — 78815 PET IMAGE W/CT SKULL-THIGH: CPT | Mod: PET TUMOR SUBSQ TX STRATEGY | Performed by: RADIOLOGY

## 2024-12-26 PROCEDURE — A9552 F18 FDG: HCPCS | Performed by: INTERNAL MEDICINE

## 2024-12-26 PROCEDURE — 78815 PET IMAGE W/CT SKULL-THIGH: CPT | Mod: PS

## 2024-12-26 RX ORDER — SERTRALINE HYDROCHLORIDE 25 MG/1
25 TABLET, FILM COATED ORAL DAILY
Qty: 90 TABLET | Refills: 0 | Status: SHIPPED | OUTPATIENT
Start: 2024-12-26 | End: 2025-03-26

## 2024-12-26 RX ORDER — BUPROPION HYDROCHLORIDE 300 MG/1
300 TABLET ORAL EVERY MORNING
Qty: 90 TABLET | Refills: 0 | Status: SHIPPED | OUTPATIENT
Start: 2024-12-26 | End: 2025-03-26

## 2024-12-26 RX ORDER — LORAZEPAM 1 MG/1
TABLET ORAL
Qty: 45 TABLET | Refills: 1 | Status: SHIPPED | OUTPATIENT
Start: 2024-12-26

## 2024-12-26 RX ORDER — FLUDEOXYGLUCOSE F 18 200 MCI/ML
1328 INJECTION, SOLUTION INTRAVENOUS
Status: COMPLETED | OUTPATIENT
Start: 2024-12-26 | End: 2024-12-26

## 2024-12-26 NOTE — PROGRESS NOTES
Nonbillable note : ran oarrs and no concerns on oarrs , reviewed med order history in the [facility] emr  , sent requested refills to pharmacy [facility]

## 2024-12-31 ENCOUNTER — TELEPHONE (OUTPATIENT)
Dept: HEMATOLOGY/ONCOLOGY | Facility: CLINIC | Age: 74
End: 2024-12-31
Payer: MEDICARE

## 2024-12-31 NOTE — TELEPHONE ENCOUNTER
Pt called wanting to schedule a follow up after her recent PET scan and then to resume treatment. She also states she notes a cough with clear production. Pt scheduled for 1/17/25 with Dr. Gonsales and treatment to follow after office visit. Pt is agreeable and will call with any other concerns.

## 2025-01-09 ENCOUNTER — APPOINTMENT (OUTPATIENT)
Dept: UROLOGY | Facility: CLINIC | Age: 75
End: 2025-01-09
Payer: MEDICARE

## 2025-01-09 ENCOUNTER — TELEPHONE (OUTPATIENT)
Dept: UROLOGY | Facility: CLINIC | Age: 75
End: 2025-01-09

## 2025-01-09 NOTE — TELEPHONE ENCOUNTER
Pt sent this message when cancelling her appt for today. Please advise, thanks.    Cancel reason of Patient: Canceled via MyChart (I have c-diff and UTI and have not gotten medication for it yet. Did not feel I should come in with the c-diff. My main concern is getting rid of these UTIs as I have a trip scheduled for end of June and I can’t going I have to worry about these recurring UTIs. I have read that mild antibiotics for at least six weeks after it is gone may help in preventing it from coming back. We are not killing the bacteria in the bladder and that’s why it keeps coming back.)  Rescheduled to Thu Feb 13, 2025 1:20 PM

## 2025-01-10 NOTE — TELEPHONE ENCOUNTER
Sent my chart message to pt...    Good afternoon Janee, I am writing you back in response to the message you wrote yesterday regarding cancelling your appt...     Elvira is asking if you are following up with Dr. Schroeder as that is your infectious disease dr and with your complex history and having gotten c-diff again, that provider can probably answer that question better as far as the low dose antibiotics would be concerned. Also, Elvira wanted me to ask if you ever had your cystoscopy done by Dr. Ware at [facility] as discussed at your last appt. If you did, please let us know so we can get those records. We can certainly try to get you set up for a virtual appt as well to discuss further. Just let us know. Thanks and have a great weekend.

## 2025-01-13 ENCOUNTER — HOSPITAL ENCOUNTER (OUTPATIENT)
Dept: RADIOLOGY | Facility: CLINIC | Age: 75
Discharge: HOME | End: 2025-01-13
Payer: MEDICARE

## 2025-01-13 DIAGNOSIS — C79.31 MALIGNANT NEOPLASM METASTATIC TO BRAIN (MULTI): ICD-10-CM

## 2025-01-13 PROCEDURE — 70553 MRI BRAIN STEM W/O & W/DYE: CPT | Performed by: RADIOLOGY

## 2025-01-13 PROCEDURE — 2550000001 HC RX 255 CONTRASTS

## 2025-01-13 PROCEDURE — 70553 MRI BRAIN STEM W/O & W/DYE: CPT

## 2025-01-13 PROCEDURE — A9575 INJ GADOTERATE MEGLUMI 0.1ML: HCPCS

## 2025-01-13 RX ORDER — GADOTERATE MEGLUMINE 376.9 MG/ML
15 INJECTION INTRAVENOUS
Status: COMPLETED | OUTPATIENT
Start: 2025-01-13 | End: 2025-01-13

## 2025-01-13 RX ADMIN — GADOTERATE MEGLUMINE 15 ML: 376.9 INJECTION INTRAVENOUS at 11:34

## 2025-01-14 ENCOUNTER — TELEPHONE (OUTPATIENT)
Dept: RADIATION ONCOLOGY | Facility: HOSPITAL | Age: 75
End: 2025-01-14
Payer: MEDICARE

## 2025-01-14 NOTE — TELEPHONE ENCOUNTER
[facility]. Pt confirmed appointment.

## 2025-01-15 RX ORDER — DIPHENHYDRAMINE HYDROCHLORIDE 50 MG/ML
50 INJECTION INTRAMUSCULAR; INTRAVENOUS AS NEEDED
Status: CANCELLED | OUTPATIENT
Start: 2025-01-17

## 2025-01-15 RX ORDER — FAMOTIDINE 10 MG/ML
20 INJECTION INTRAVENOUS ONCE AS NEEDED
Status: CANCELLED | OUTPATIENT
Start: 2025-01-17

## 2025-01-15 RX ORDER — PROCHLORPERAZINE MALEATE 10 MG
10 TABLET ORAL EVERY 6 HOURS PRN
Status: CANCELLED | OUTPATIENT
Start: 2025-01-17

## 2025-01-15 RX ORDER — EPINEPHRINE 0.3 MG/.3ML
0.3 INJECTION SUBCUTANEOUS EVERY 5 MIN PRN
Status: CANCELLED | OUTPATIENT
Start: 2025-01-17

## 2025-01-15 RX ORDER — ALBUTEROL SULFATE 0.83 MG/ML
3 SOLUTION RESPIRATORY (INHALATION) AS NEEDED
Status: CANCELLED | OUTPATIENT
Start: 2025-01-17

## 2025-01-15 RX ORDER — PROCHLORPERAZINE EDISYLATE 5 MG/ML
10 INJECTION INTRAMUSCULAR; INTRAVENOUS EVERY 6 HOURS PRN
Status: CANCELLED | OUTPATIENT
Start: 2025-01-17

## 2025-01-15 ASSESSMENT — ENCOUNTER SYMPTOMS
HEMATURIA: 0
WHEEZING: 0
ADENOPATHY: 0
NERVOUS/ANXIOUS: 0
ABDOMINAL PAIN: 0
NECK PAIN: 0
TROUBLE SWALLOWING: 0
ARTHRALGIAS: 0
DIFFICULTY URINATING: 0
DIARRHEA: 0
EYE PROBLEMS: 0
HEADACHES: 0
NECK STIFFNESS: 0
CONSTIPATION: 0
CHEST TIGHTNESS: 0
CONFUSION: 0
SEIZURES: 0
SHORTNESS OF BREATH: 0
BACK PAIN: 0
VOMITING: 0
LIGHT-HEADEDNESS: 0
SPEECH DIFFICULTY: 0
CHILLS: 0
FEVER: 0
DIZZINESS: 0
ABDOMINAL DISTENTION: 0
NAUSEA: 0
LEG SWELLING: 0
DEPRESSION: 0
FATIGUE: 0

## 2025-01-16 ENCOUNTER — HOSPITAL ENCOUNTER (OUTPATIENT)
Dept: RADIATION ONCOLOGY | Facility: HOSPITAL | Age: 75
Setting detail: RADIATION/ONCOLOGY SERIES
Discharge: HOME | End: 2025-01-16
Payer: MEDICARE

## 2025-01-16 DIAGNOSIS — C79.31 MALIGNANT NEOPLASM METASTATIC TO BRAIN (MULTI): ICD-10-CM

## 2025-01-16 PROCEDURE — 99214 OFFICE O/P EST MOD 30 MIN: CPT | Mod: 95

## 2025-01-16 PROCEDURE — 99214 OFFICE O/P EST MOD 30 MIN: CPT

## 2025-01-16 ASSESSMENT — ENCOUNTER SYMPTOMS
COUGH: 1
EXTREMITY WEAKNESS: 1

## 2025-01-16 NOTE — PROGRESS NOTES
Radiation Oncology Follow-Up    Patient Name:  Janee Maddox  MRN:  [id number]  :  1950    Referring Provider: Myron Rothman, *  Primary Care Provider: Frances Cervantes MD  Care Team: Patient Care Team:  Frances Cervantes MD as PCP - General (Internal Medicine)  Frances Cervantes MD as PCP - [employer] PCP  Prasanth Gonsales MD as Consulting Physician (Hematology and Oncology)  Shalom Wen MD as Consulting Physician (Hematology and Oncology)  Kenia Jackson RN as Registered Nurse (Hematology and Oncology)  HATTIE Kang-CNS as Nurse Practitioner (Geriatric Psychiatry)      Date of Service: 2025    HEMATOLOGY ONCOLOGY PROBLEMS:  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  TP53 mutated.        a. Ist line therapy with carboplatin/Taxol/Keytruda X 4 cycles from Feb to May 2024.        b. F/U PET scan from May 2024 with excellent response.          c. S/p stereotactic radiation to the brain lesion in 2024.        d.  Maintenance immunotherapy with Keytruda beginning May 2024    HISTORY:   74 y.o. female with right lung squamous cell carcinoma.  She is a lifelong smoker and was admitted at [facility] in 2023 with complaints of shortness of breath, chest discomfort and positive bacteremia.  During the evaluation she was noted to have a large cavitary lesion in the right lung along with pleural effusion.  CT scan at that time showed a 6.1 x 5 cm right lower lobe cavitary lung mass along with loculated right-sided pleural effusion and prominent mediastinal and hilar lymphadenopathy.  Overall findings were concerning for either abscess or baseline malignancy.       She was transferred to [facility] where a CT-guided biopsy confirmed invasive squamous cell carcinoma.  PD-L1 expression was 5%.  Tumor NGS panel was mainly suggestive of TP53 mutation.  During hospitalization her clinical course was also complicated by Pantoea  bacteremia and an ESBL Ecoli UTI.  She was treated with antibiotics and other supportive care.       A follow-up PET scan from Jan 2024 confirmed increased metabolic activity in the right lower lobe pleural-based cavitary mass along with mediastinal and right hilar lymphadenopathy and right-sided pleural disease consistent with known malignancy.  There was no evidence of distant metastatic disease.       Brain MRI showed small solitary left parietal metastatic lesion and she is s/p stereotactic radiation treatment in March 2024 to 3 lesions.      She was treated with first-line therapy with carbo/Taxol/Keytruda  X 4 cycles from Feb to May 2024.  Treatment course was complicated by recurrent UTI and other issues.  Posttreatment follow-up PET scan from May 2024 showed excellent response.  She is currently being treated with maintenance immunotherapy with Keytruda since May 2024.    SRS: Not Applicable Brain    Treatment Period Technique Fraction Dose Fractions Total Dose   Course 1 3/19/2024-3/19/2024  (days elapsed: 0)         A:LF62 3/19/2024-3/19/2024 Gamma-Knife 2200 / 2,200 cGy 1 / 1 2200 / 2,200 cGy         B: 3/19/2024-3/19/2024 Gamma-Knife 2200 / 2,200 cGy 1 / 1 2200 / 2,200 cGy         C:LP97 3/19/2024-3/19/2024 Gamma-Knife 2200 / 2,200 cGy 1 / 1 2200 / 2,200 cGy     Past Medical History:   Diagnosis Date    Agoraphobia, unspecified 12/06/2016    Agoraphobia    Body mass index (BMI) 33.0-33.9, adult     BMI 33.0-33.9,adult    Chronic sinusitis, unspecified     Recurrent sinus infections    Coronary artery disease     Cutaneous abscess, unspecified 06/01/2016    Abscess    Disruption of external operation (surgical) wound, not elsewhere classified, initial encounter 07/31/2017    Open abdominal incision with drainage    Diverticulitis of intestine, part unspecified, without perforation or abscess without bleeding 08/13/2018    Acute diverticulitis    Encounter for immunization 04/21/2021    Encounter for  immunization    Encounter for screening for lipoid disorders 04/26/2016    Screening cholesterol level    Hypercholesteremia     Hypertension     IgG deficiency (Multi) 05/03/2024    Incisional hernia without obstruction or gangrene 09/11/2015    Incisional hernia    Infection following a procedure, other surgical site, initial encounter 04/20/2017    Incisional infection    Local infection of the skin and subcutaneous tissue, unspecified 05/01/2019    Skin infection    Other conditions influencing health status     Complete Colonoscopy    Other specified postprocedural states 05/15/2018    History of incision and drainage    Otitis media, unspecified, left ear 05/19/2014    Otitis media, left    Pain in right knee 02/16/2015    Bilateral knee pain    Personal history of other diseases of the circulatory system     History of hypertension    Personal history of other infectious and parasitic diseases 07/03/2018    History of onychomycosis    Personal history of other infectious and parasitic diseases 10/18/2019    History of Clostridioides difficile colitis    Personal history of other specified conditions 04/20/2017    History of dysuria    Personal history of other specified conditions 02/02/2017    History of nocturia    Personal history of other specified conditions 10/18/2019    History of diarrhea    Personal history of other specified conditions 12/06/2019    History of dysuria    Personal history of other specified conditions 02/16/2015    History of dyspnea    Personal history of urinary (tract) infections 12/11/2019    History of urinary tract infection    Squamous cell carcinoma of lower lobe of right lung (Multi) 01/24/2024    Syncope and collapse 01/05/2018    Collapse    Tinnitus, left ear 08/29/2014    Tinnitus of left ear    Unspecified open wound of abdominal wall, unspecified quadrant without penetration into peritoneal cavity, initial encounter 08/23/2019    Wound, open, abdominal wall, anterior       Past Surgical History:   Procedure Laterality Date    CARPAL TUNNEL RELEASE  2014    Neuroplasty Decompression Median Nerve At Carpal Tunnel    CHOLECYSTECTOMY  2014    Cholecystectomy    COLONOSCOPY  2021    Complete Colonoscopy    CT GUIDED ASPIRATION OF ABSCESS, HEMATOMA, CYST  2023    CT GUIDED ASPIRATION OF ABSCESS, HEMATOMA, CYST 2023 PAR CT    CT GUIDED PERCUTANEOUS BIOPSY LUNG  2023    CT GUIDED PERCUTANEOUS BIOPSY LUNG 2023 PAR CT    HYSTERECTOMY  2014    Hysterectomy    KNEE ARTHROSCOPY W/ DEBRIDEMENT  2015    Knee Arthroscopy (Therapeutic)    OTHER SURGICAL HISTORY  2019    Incisional Hernia Repair    RECTAL VAGINAL FISTULECTOMY  2014    Rectocele Repair      Social History     Tobacco Use    Smoking status: Former     Current packs/day: 0.00     Average packs/day: 1 pack/day for 62.0 years (62.0 ttl pk-yrs)     Types: Cigarettes     Start date: 1961     Quit date: 2023     Years since quittin.2    Smokeless tobacco: Not on file   Substance Use Topics    Alcohol use: Not Currently      SUBJECTIVE  History of Present Illness (Virtual visit with the patient at her home with me at the [facility]):   Janee Maddox is a 74 y.o. 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  Today the patient is in clinic for a routine Radiation Oncology FU visit and review of an MRI completed on 25.  Per a Radiology read, there is no evidence of mass, infarction or hemorrhage.  Today the patient states that she's doing okay.  She states that she's been having some intermittent dizziness.  She does have a remote history of vertigo but hasn't had it for many years now.  She will be discussing with Dr. Gonsales at next weeks visit.  She also reports of some generalized weakness in her b/l legs  which have been stable.  Denies headaches, vision changes, or new focal sensory/motor deficits.  Endorses being recently treated for CDIFF with Vancomycin.  Continues to be treated systemically with Pembrolizumab under the direction of Dru.       1/16/25 Interval Visit:    An interactive audio and video telecommunication system which permits real time communications between the patient (at the originating site) and provider (at the distant site) was utilized to provide this telehealth service.   Verbal consent was requested and obtained from Janee Maddox on this date, 01/15/25 for a telehealth visit.      Today the patient is connecting with me virtually for a routine Radiation Oncology FU visit and review of an MRI of the brain completed on 1/13/24.  Per a Radiology read, there is no new mass or pathologic enhancement to suggest intracranial  metastatic disease was identified. 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 This was discussed with the patient.     Overall, the patient states that she's doing well and looking forward to a  river cruise in June, 2025.  The patient continues to have some generalized weakness in her LE that she attributes to being deconditioned from her prior treatments.  She denies seizures, headaches, vision changes, dizziness, instability or new focal sensory/motor deficits.  Denies chills, fever, SOB or chest pain.  She does endorse a new cough that his mildly productive.  Denies abdominal pain, nausea, vomiting, diarrhea or constipation.  She does endorse a chronic UTI and is currently following with Infective Disease for management.      Review of Systems:   Review of Systems   Constitutional:  Negative for chills, fatigue and fever.   HENT:   Negative for hearing loss and trouble swallowing.    Eyes:  Negative for eye problems.   Respiratory:  Positive for cough. Negative for chest tightness, shortness of breath and wheezing.    Cardiovascular:  Negative for  "chest pain and leg swelling.   Gastrointestinal:  Negative for abdominal distention, abdominal pain, constipation, diarrhea, nausea and vomiting.   Genitourinary:  Negative for difficulty urinating and hematuria.         Chronic UTI.   Musculoskeletal:  Negative for arthralgias, back pain, neck pain and neck stiffness.   Skin:  Negative for rash.   Neurological:  Positive for extremity weakness (Generalized weakness of the LE). Negative for dizziness, headaches, light-headedness, seizures and speech difficulty.   Hematological:  Negative for adenopathy.   Psychiatric/Behavioral:  Negative for confusion and depression. The patient is not nervous/anxious.      Performance Status:   The Karnofsky performance scale today is 100, Fully active, able to carry on all pre-disease performed without restriction (ECOG equivalent 0).    Pain: Patient doesn't c/o pain today.     OBJECTIVE  Vital Signs:      8/21/2024    11:45 AM 8/21/2024     1:45 PM 9/25/2024    12:00 PM 10/2/2024    11:23 AM 10/2/2024     1:00 PM 10/3/2024    11:45 AM 1/13/2025    10:53 AM   Vitals   Systolic 104 124  121 113 101    Diastolic 69 64  70 61 60    Heart Rate 80 75  90 78 88    Temp 36.7 °C (98.1 °F) 36.5 °C (97.7 °F)  36.3 °C (97.3 °F) 36.5 °C (97.7 °F) 36.3 °C (97.3 °F)    Resp 20 18  20 18     Height 1.592 m (5' 2.68\")   1.549 m (5' 1\")   1.549 m (5' 1\") 1.549 m (5' 1\")   Weight (lb) 152.78  145 149.91  149 155   BMI 27.34 kg/m2  27.4 kg/m2 28.33 kg/m2  28.15 kg/m2 29.29 kg/m2   BSA (m2) 1.75 m2  1.68 m2 1.71 m2  1.71 m2 1.74 m2   Visit Report Report Report  Report Report Report        Significant value      Physical Exam    MR BRAIN W AND WO IV CONTRAST; 1/13/2025 11:52 am    IMPRESSION:  No new mass or pathologic enhancement to suggest intracranial  metastatic disease was identified. 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    ASSESSMENT:   74 y.o. female who was previously seen at the [facility] " Department of Radiation Oncology for metastatic 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  An MRI of the brain completed on 1/13/25 shows LIVIA.  We discussed completing an MRI in 3 months and the patient was amenable.  All questions/concerns were addressed to the satisfaction of the patient.     PLAN:      --MRI in 3 months.   --FU visit with Mamadou Rothman CNP in 3 months.   --Continue to follow with Dr. Gonsales for Medical Oncology management.     Please reach out with any questions or concerns.   NCCN Guidelines were applicable to guide this patients treatment plan.  HATTIE Krause-CNP

## 2025-01-17 ENCOUNTER — INFUSION (OUTPATIENT)
Dept: HEMATOLOGY/ONCOLOGY | Facility: CLINIC | Age: 75
End: 2025-01-17
Payer: MEDICARE

## 2025-01-17 ENCOUNTER — SOCIAL WORK (OUTPATIENT)
Dept: HEMATOLOGY/ONCOLOGY | Facility: CLINIC | Age: 75
End: 2025-01-17

## 2025-01-17 ENCOUNTER — OFFICE VISIT (OUTPATIENT)
Dept: HEMATOLOGY/ONCOLOGY | Facility: CLINIC | Age: 75
End: 2025-01-17
Payer: MEDICARE

## 2025-01-17 VITALS
RESPIRATION RATE: 18 BRPM | SYSTOLIC BLOOD PRESSURE: 122 MMHG | TEMPERATURE: 97.7 F | DIASTOLIC BLOOD PRESSURE: 61 MMHG | OXYGEN SATURATION: 94 % | HEART RATE: 76 BPM

## 2025-01-17 VITALS
RESPIRATION RATE: 20 BRPM | SYSTOLIC BLOOD PRESSURE: 127 MMHG | TEMPERATURE: 98.1 F | DIASTOLIC BLOOD PRESSURE: 62 MMHG | WEIGHT: 158.73 LBS | BODY MASS INDEX: 29.99 KG/M2 | OXYGEN SATURATION: 95 % | HEART RATE: 82 BPM

## 2025-01-17 DIAGNOSIS — C79.31 MALIGNANT NEOPLASM METASTATIC TO BRAIN (MULTI): ICD-10-CM

## 2025-01-17 DIAGNOSIS — E03.2 HYPOTHYROIDISM DUE TO DRUGS: ICD-10-CM

## 2025-01-17 DIAGNOSIS — C34.11 SQUAMOUS CELL CARCINOMA OF UPPER LOBE OF RIGHT LUNG (MULTI): ICD-10-CM

## 2025-01-17 DIAGNOSIS — C34.31 SQUAMOUS CELL CARCINOMA OF LOWER LOBE OF RIGHT LUNG (MULTI): ICD-10-CM

## 2025-01-17 DIAGNOSIS — C34.31 SQUAMOUS CELL CARCINOMA OF LOWER LOBE OF RIGHT LUNG (MULTI): Primary | ICD-10-CM

## 2025-01-17 LAB
ALBUMIN SERPL BCP-MCNC: 3.9 G/DL (ref 3.4–5)
ALP SERPL-CCNC: 46 U/L (ref 33–136)
ALT SERPL W P-5'-P-CCNC: 9 U/L (ref 7–45)
ANION GAP SERPL CALC-SCNC: 14 MMOL/L (ref 10–20)
AST SERPL W P-5'-P-CCNC: 10 U/L (ref 9–39)
BASOPHILS # BLD AUTO: 0.05 X10*3/UL (ref 0–0.1)
BASOPHILS NFR BLD AUTO: 1.1 %
BILIRUB SERPL-MCNC: 0.3 MG/DL (ref 0–1.2)
BUN SERPL-MCNC: 25 MG/DL (ref 6–23)
CALCIUM SERPL-MCNC: 9.2 MG/DL (ref 8.6–10.3)
CHLORIDE SERPL-SCNC: 104 MMOL/L (ref 98–107)
CO2 SERPL-SCNC: 25 MMOL/L (ref 21–32)
CREAT SERPL-MCNC: 1.35 MG/DL (ref 0.5–1.05)
EGFRCR SERPLBLD CKD-EPI 2021: 41 ML/MIN/1.73M*2
EOSINOPHIL # BLD AUTO: 0.47 X10*3/UL (ref 0–0.4)
EOSINOPHIL NFR BLD AUTO: 10.6 %
ERYTHROCYTE [DISTWIDTH] IN BLOOD BY AUTOMATED COUNT: 15 % (ref 11.5–14.5)
GLUCOSE SERPL-MCNC: 145 MG/DL (ref 74–99)
HCT VFR BLD AUTO: 33.4 % (ref 36–46)
HGB BLD-MCNC: 10.4 G/DL (ref 12–16)
IMM GRANULOCYTES # BLD AUTO: 0.03 X10*3/UL (ref 0–0.5)
IMM GRANULOCYTES NFR BLD AUTO: 0.7 % (ref 0–0.9)
LYMPHOCYTES # BLD AUTO: 0.9 X10*3/UL (ref 0.8–3)
LYMPHOCYTES NFR BLD AUTO: 20.4 %
MCH RBC QN AUTO: 27.7 PG (ref 26–34)
MCHC RBC AUTO-ENTMCNC: 31.1 G/DL (ref 32–36)
MCV RBC AUTO: 89 FL (ref 80–100)
MONOCYTES # BLD AUTO: 0.26 X10*3/UL (ref 0.05–0.8)
MONOCYTES NFR BLD AUTO: 5.9 %
NEUTROPHILS # BLD AUTO: 2.71 X10*3/UL (ref 1.6–5.5)
NEUTROPHILS NFR BLD AUTO: 61.3 %
NRBC BLD-RTO: 0 /100 WBCS (ref 0–0)
PLATELET # BLD AUTO: 171 X10*3/UL (ref 150–450)
POTASSIUM SERPL-SCNC: 4 MMOL/L (ref 3.5–5.3)
PROT SERPL-MCNC: 6.5 G/DL (ref 6.4–8.2)
RBC # BLD AUTO: 3.76 X10*6/UL (ref 4–5.2)
SODIUM SERPL-SCNC: 139 MMOL/L (ref 136–145)
TSH SERPL-ACNC: 1.42 MIU/L (ref 0.44–3.98)
WBC # BLD AUTO: 4.4 X10*3/UL (ref 4.4–11.3)

## 2025-01-17 PROCEDURE — 99215 OFFICE O/P EST HI 40 MIN: CPT | Mod: 25 | Performed by: INTERNAL MEDICINE

## 2025-01-17 PROCEDURE — 96413 CHEMO IV INFUSION 1 HR: CPT

## 2025-01-17 PROCEDURE — 85025 COMPLETE CBC W/AUTO DIFF WBC: CPT

## 2025-01-17 PROCEDURE — 1126F AMNT PAIN NOTED NONE PRSNT: CPT | Performed by: INTERNAL MEDICINE

## 2025-01-17 PROCEDURE — 84443 ASSAY THYROID STIM HORMONE: CPT

## 2025-01-17 PROCEDURE — 2500000004 HC RX 250 GENERAL PHARMACY W/ HCPCS (ALT 636 FOR OP/ED): Performed by: INTERNAL MEDICINE

## 2025-01-17 PROCEDURE — 80053 COMPREHEN METABOLIC PANEL: CPT

## 2025-01-17 PROCEDURE — 99215 OFFICE O/P EST HI 40 MIN: CPT | Performed by: INTERNAL MEDICINE

## 2025-01-17 RX ORDER — HEPARIN 100 UNIT/ML
500 SYRINGE INTRAVENOUS AS NEEDED
Status: DISCONTINUED | OUTPATIENT
Start: 2025-01-17 | End: 2025-01-17 | Stop reason: HOSPADM

## 2025-01-17 RX ORDER — HEPARIN SODIUM,PORCINE/PF 10 UNIT/ML
50 SYRINGE (ML) INTRAVENOUS AS NEEDED
OUTPATIENT
Start: 2025-01-17

## 2025-01-17 RX ORDER — HEPARIN 100 UNIT/ML
500 SYRINGE INTRAVENOUS AS NEEDED
OUTPATIENT
Start: 2025-01-17

## 2025-01-17 RX ADMIN — HEPARIN 500 UNITS: 100 SYRINGE at 14:33

## 2025-01-17 RX ADMIN — SODIUM CHLORIDE 400 MG: 9 INJECTION, SOLUTION INTRAVENOUS at 13:52

## 2025-01-17 ASSESSMENT — PAIN SCALES - GENERAL: PAINLEVEL_OUTOF10: 0-NO PAIN

## 2025-01-17 NOTE — PROGRESS NOTES
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  TP53 mutated.        a. Ist line therapy with carboplatin/Taxol/Keytruda X 4 cycles from Feb to May 2024.        b. F/U PET scan from May 2024 with excellent response.          c. S/p stereotactic radiation to the brain lesion in March 2024.        d.  Maintenance immunotherapy with Keytruda beginning May 2024    CHIEF COMPLAINTS:  Patient is in the clinic today for evaluation of right lung squamous cell carcinoma and for continuation of the therapy and management of therapy-related effects.    HISTORY:  Janee Maddox is a 74 y.o. female with right lung squamous cell carcinoma.  She is a lifelong smoker and was admitted at [facility] in Nov 2023 with complaints of shortness of breath, chest discomfort and positive bacteremia.  During the evaluation she was noted to have a large cavitary lesion in the right lung along with pleural effusion.  CT scan at that time showed a 6.1 x 5 cm right lower lobe cavitary lung mass along with loculated right-sided pleural effusion and prominent mediastinal and hilar lymphadenopathy.  Overall findings were concerning for either abscess or baseline malignancy.  She was transferred to [facility] where a CT-guided biopsy confirmed invasive squamous cell carcinoma.  PD-L1 expression was 5%.  Tumor NGS panel was mainly suggestive of TP53 mutation.  During hospitalization her clinical course was also complicated by Pantoea bacteremia and an ESBL Ecoli UTI.  She was treated with antibiotics and other supportive care.  A follow-up PET scan from Jan 2024 confirmed increased metabolic activity in the right lower lobe pleural-based cavitary mass along with mediastinal and right hilar lymphadenopathy and right-sided pleural disease  consistent with known malignancy.  There was no evidence of distant metastatic disease.  Brain MRI showed small solitary left parietal metastatic lesion and she is s/p stereotactic radiation treatment in 2024.  She was treated with first-line therapy with carbo/Taxol/Keytruda  X 4 cycles from Feb to May 2024.  Treatment course was complicated by recurrent UTI and other issues.  Posttreatment follow-up PET scan from May 2024 showed excellent response.  She is currently being treated with maintenance immunotherapy with Keytruda since May 2024.    INTERVAL HISTORY:  She is tolerating immunotherapy well. Diarrhea is improved after being treated for C diff.  Still has issues with recurrent UTIs.  Follow-up PET scan and brain MRI results are unremarkable.  Overall significant improvement in her quality of life and functionality compared to the time when she was diagnosed with lung cancer.  Looking forward to a planned [place] cruise vacation in summer this year.    PAST MEDICAL HISTORY:  1.  Right lower lobe squamous cell lung cancer as detailed above.  2.  Coronary artery disease  3.  Hypertension  4.  Hyperlipidemia  5.  GERD  6.  Anxiety/depression  7.  History of C. difficile colitis  8.  E. coli UTI.  9.  History of complete hysterectomy, cholecystectomy and incisional hernia surgeries    SOCIAL HISTORY:  She lives alone in [place].  Quit smoking in 2023.  1 pack a day for 60 years prior smoking history.  Admit to occasional alcohol intake.  She work as a tax preparor.  Born and raised in [place].    FAMILY HISTORY:  Father  at age 86 from coronary artery disease. Mother also  at age 86 from multiple medical issues.  She had 1 sister and 4/2 siblings.  One of them  from myocardial infarction.  3 children, 7 grandkids and 1 great grand kid ~ all alive and well.  No other family history of bleeding, clotting or malignant disorders in the family history.    REVIEW OF SYSTEMS:   Pertinent  finding as per the history above.  All other systems have been reviewed and generally negative and noncontributory.    VITAL SIGNS  BSA: There is no height or weight on file to calculate BSA.  There were no vitals taken for this visit.    PHYSICAL EXAMINATION:  Detailed physical examination not done.    LAB DATA:  CBC from today shows a hemoglobin of 10.4 with MCV of 89 4.4.  White cell count is normal at 3.4 and platelets are 171.  Metabolic profile and TSH are pending.  Last 3 sets of blood work were reviewed and the trend was noted.    RADIOLOGICAL DATA:  Brain MRI 1/13/2025   Impression:  No new mass or pathologic enhancement to suggest intracranial metastatic disease was identified. 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     PET scan 12/26/2024  Impression:  1. Interval mild decrease in size of right lower lobe cavitary mass, with mild decrease in extent of hypermetabolic activity extending to  the right chest wall, invading into the ribs. However, the intensity of activity at this site has increased as compared to prior, which is  concerning for disease progression.  2. No evidence of hypermetabolic lymphadenopathy, with continued decrease in hypermetabolic activity of mediastinal lymph nodes, when compared to prior.  3. No evidence of new hypermetabolic activity throughout the body to suggest new metastatic disease.     PATHOLOGY DATA:  Surgical Pathology Exam: [id number]   FINAL DIAGNOSIS   A. LUNG, RIGHT; BIOPSY:    -- INVASIVE SQUAMOUS CELL CARCINOMA, KERATINIZING. See comment   Electronically signed by Lino Carrillo MD on 12/1/2023      PD-L1 22C3  (KEYTRUDA)Tumor Proportion Score (TPS): 5%   MICROSATELLITE STATUS: Microsatellite Instability-High (MSI-H) is NOT DETECTED.  DISEASE ASSOCIATED GENOMIC FINDINGS:   TP53 p.G245V ([id number] c.734G>T)  DISEASE RELEVANT ALTERATIONS NOT DETECTED:   Negative for ALK fusion.  Negative for BRAF V600E.  Negative for EGFR sensitizing mutation.  Negative for  ERBB2 activating mutation  Negative for KRAS G12C.  Negative for MET exon 14 skipping mutation.  Negative for NTRK fusion.  Negative for RET fusion.  Negative for ROS1 fusion.    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  TP53 mutated.  Please refer  to the details of initial presentation and management as outlined above.  In summary she is a lifelong smoker and was noted to have a large cavitary right lower lung pleural-based lesion along with extensive mediastinal and hilar lymphadenopathy and loculated pleural effusion in November 2023.  CT-guided biopsy was confirmatory of invasive squamous cell carcinoma.  PD-L1 TPS score was 5%.  NGS panel was unremarkable other than finding of TP53 mutation.  Brain MRI showed a small 3 mm left frontal lobe lesion concerning for metastatic disease.  She received stereotactic brain radiation in March 2024 and was treated with first-line therapy with carbo/Taxol/Keytruda X 4 cycles from Feb to May 2024.  Treatment course was complicated by recurrent UTI and other issues.  Posttreatment follow-up PET scan from May 2024 showed excellent response.  She is currently being treated with maintenance immunotherapy with Keytruda since May 2024.    She will continue with maintenance immunotherapy with Keytruda at 6 weekly interval.  Probable benefit and side effect profile of mainly  weakness, fatigue, colitis, pneumonitis, endocrinopathies, transaminitis etc. were explained to them in detail.  Her overall prognosis remain guarded.    2.  Brain mets.  MRI results were suggestive of small solitary left parietal lobe lesion.  She is s/p stereotactic radiation therapy in Mar 2024. She will continue to F/U with radiation oncology team.    3.  Recurrent UTI.  She has been closely followed by ID team and is being treated with frequent antibiotic courses.    4.  Diarrhea.  She has been treated for C diff colitis with improvement in diarrhea.  She will  continue to follow up with GI team.    5.  Follow-up.  Follow-up with me in 6 weeks.    This note has been transcribed using Dragon voice recognition system and there is a possibility of unintentional typing misprints.

## 2025-01-17 NOTE — PROGRESS NOTES
Followed up with pt. Pt here for keytruda. Pt has been doing fairly well at home. Pt mentions her friend brought her today and she is still having someone go with her to appts. Pt using a cane but seems to be able to manage at home. Per pt she has been independent with adls and managing at home. She does not feel she needs any help at this time. Inquired about her dtr. Per pt she does see her but she knows she is busy with her kids with disabilities and has been using other supports. Pt is still dealing with her chronic uti and on antibiotic always 9 days after finishing the round prior to the one she just took. Pt asking to be placed on a low dose antibiotic to try and keep the infection away. Pt has planned this river cruise she is going on with her cousin and does not want to be worrying about this in June. Pt inquired about medicaid and if she would qualify and also about a madelin she had to help with mortgage one month last year. Let her know Onc LISW-S would check her file to see what Onc LISA and pt had worked on in the past.     Went back to pt and she left. Called pt and she indicated she was done. Let her know the amount she could not go over 138% below poverty level. Pt indicated she was over that. Pt has not had any MD bills through [facility] to submit UCAP. She has MD bills through [facility] and has submitted apps to them. Also let her know a madelin she had last year would not be open until March 2025 [facility] in [place].

## 2025-01-22 ENCOUNTER — APPOINTMENT (OUTPATIENT)
Dept: BEHAVIORAL HEALTH | Facility: CLINIC | Age: 75
End: 2025-01-22
Payer: MEDICARE

## 2025-01-22 DIAGNOSIS — G47.09 OTHER INSOMNIA: ICD-10-CM

## 2025-01-22 DIAGNOSIS — F32.5 DEPRESSION, MAJOR, IN REMISSION (CMS-HCC): ICD-10-CM

## 2025-01-22 DIAGNOSIS — F41.1 GAD (GENERALIZED ANXIETY DISORDER): ICD-10-CM

## 2025-01-22 PROCEDURE — 1159F MED LIST DOCD IN RCRD: CPT | Performed by: CLINICAL NURSE SPECIALIST

## 2025-01-22 PROCEDURE — 1160F RVW MEDS BY RX/DR IN RCRD: CPT | Performed by: CLINICAL NURSE SPECIALIST

## 2025-01-22 PROCEDURE — 99213 OFFICE O/P EST LOW 20 MIN: CPT | Performed by: CLINICAL NURSE SPECIALIST

## 2025-01-22 RX ORDER — LORAZEPAM 1 MG/1
TABLET ORAL
Qty: 45 TABLET | Refills: 2 | Status: SHIPPED | OUTPATIENT
Start: 2025-01-22

## 2025-01-22 RX ORDER — SERTRALINE HYDROCHLORIDE 25 MG/1
25 TABLET, FILM COATED ORAL DAILY
Qty: 90 TABLET | Refills: 0 | Status: SHIPPED | OUTPATIENT
Start: 2025-01-22 | End: 2025-04-22

## 2025-01-22 RX ORDER — BUPROPION HYDROCHLORIDE 300 MG/1
300 TABLET ORAL EVERY MORNING
Qty: 90 TABLET | Refills: 0 | Status: SHIPPED | OUTPATIENT
Start: 2025-01-22 | End: 2025-04-22

## 2025-01-22 NOTE — PROGRESS NOTES
Outpatient Psychiatry      Subjective   Janee Maddox, a 74 y.o. female,  presents as an established patient for an appointment with outpatient psychiatry for follow up/medication management  for an audio and video virtual appointment     Virtual or Telephone Consent     An interactive audio and video telecommunication system which permits real time communications between the patient (at the originating site) and provider (at the distant site) was utilized to provide this telehealth service.   Verbal consent was requested and obtained from Janee Maddox on this date, 1/22/25 for a telehealth visit.       Diagnosis:   Generalized anxiety disorder ( stable ) F 41.1   Depression major in remission  ( stable ) F 32.5  Other insomnia ( stable ) G 47.09     Treatment Plan/Recommendations:   Follow-up plan was discussed with patient.  can follow up for medications in 10-12  weeks. can continue self care and wellness efforts and maintain other appointments with other medical providers.   Psychotropic medications :  Continue  buPROPion XL (Wellbutrin XL) 300 mg 24 hr tablet, Take 1 tablet (300 mg) by mouth once daily in the morning. For depression   Continue sertraline 25 mg , daily for depression and anxiety   Continue LORazepam (Ativan) 1 mg tablet, 1 tablet each morning and 1/2 tablet each night for anxiety      Review with patient: Treatment plan reviewed with the patient.  Medication risks/benefit reviewed with the patient     HPI:Patient has been seeing oncology she completed  chemotherapy , for squamous cell carcinoma  She is having issues with panic attacks , she says she gets worried she will get shaky with driving , she gets afraid she will get dizzy also , she does not have dizziness at home , she is not driving longer distances , she has people whom can drive her most places   No cognitive changes   No falls recently   Reviewed notes in the [facility]r per other medical providers   Reviewed labs in the  [facility] emr   No issues with substance abuse   Takes precautions to prevent falls        I have personally reviewed the OARRS report for JIMENA SINHA. I have considered the risks of abuse, dependence, addiction and diversion.   I have the following concerns: no concerns on oarrs. No concerns , however will    Is the patient prescribed a combination of a benzodiazepine and opioid? No.   Last urine drug screening date/ordered , uds  2/2024 per another provider , results as expected , will defer uds , she has ongoing issues with transportation   Date of the last Controlled Substance Agreement: signed csa in person on 10/24/23, reviewed verbally 1/22/25, and will do an in person appointment in summer 2025 considering she is not driving much   BENZODIAZEPINES   What is the patient's goal of therapy? to manage pepe and support daily functioning.  Is this being achieved with current treatment? yes , she can attend to daily activities without interruption of intense anxiety.      [facility] and [facility] as noted above             Patient Active Problem List   Diagnosis    Abdominal aortic ectasia (CMS-HCC)    Abdominal pannus    Anxiety    Change in bowel habits    Chronic cough    Chronic cystitis    Cutaneous skin tags    Dependent edema    Depression, major, in remission (CMS-HCC)    Excess skin of abdomen    Genitourinary syndrome of menopause    GERD (gastroesophageal reflux disease)    Hypercholesteremia    Impaired fasting glucose    Nocturia    Numbness and tingling    Obesity    Osteoarthritis of wrist    Other insomnia    Rhinorrhea    Sensorineural hearing loss of both ears    Urge incontinence of urine    Urinary urgency    Vaginal itching    Class 2 obesity with body mass index (BMI) of 35.0 to 35.9 in adult    Long-term current use of benzodiazepine    ESBL (extended spectrum beta-lactamase) producing bacteria infection    Dysuria    Diverticulosis    Diverticulitis of intestine    COPD (chronic obstructive  pulmonary disease) (Multi)    Collapse    Coronary atherosclerosis due to severely calcified coronary lesion    CAD (coronary artery disease)    C. difficile diarrhea    Agoraphobia    Abnormal CT scan, lung    Knee pain    Recurrent sinusitis    Panic attack    Otitis media    Open wound of anterior abdominal wall    Onychomycosis    Nicotine use disorder    Cigarette nicotine dependence with nicotine-induced disorder    Incisional hernia    ILD (interstitial lung disease) (Multi)    Generalized anxiety disorder with panic attacks    Anxiety and depression    Right knee pain    Right middle lobe pulmonary nodule    Tinnitus    Umbilical hernia    UTI (urinary tract infection)    Aortic ectasia, abdominal (CMS-HCC)    Diarrhea    Abscess    Obesity with body mass index 30 or greater    Obesity, Class I, BMI 30-34.9    Skin tag    Excess skin of abdominal wall    Gastroesophageal reflux disease    Pure hypercholesterolemia    Numbness and tingling sensation of skin    Insomnia    Sensorineural hearing loss, bilateral    Squamous cell carcinoma of lower lobe of right lung (Multi)    Malignant neoplasm metastatic to brain (Multi)    IgG deficiency (Multi)     Current Outpatient Medications:     albuterol (Ventolin HFA) 90 mcg/actuation inhaler, Inhale 2 puffs if needed for wheezing or shortness of breath (every 4 to 6 hours as needed)., Disp: 18 g, Rfl: 2    ALBUTEROL INHL, 2 puffs, Inhalation, [id number], PRN PRN for wheezing, # 18 g, Refill(s) 2, Date: 10/12/2023 14:11:00 EDT, Pharmacy: [facility] #[id number], 2 puffs Inhalation [id number],PRN:for wheezing, 154.94, 09/26/2023 19:59:00 EDT, Height/Length Dosing, cm, 86.8, 07/30/2023..., Disp: , Rfl:     buPROPion XL (Wellbutrin XL) 300 mg 24 hr tablet, Take 1 tablet (300 mg) by mouth once daily in the morning. Do not crush, chew, or split., Disp: 90 tablet, Rfl: 0    estradiol (Estrace) 0.01 % (0.1 mg/gram) vaginal cream, Apply daily 1 gram (large pea) for 3 weeks, then 3 times  per week., Disp: 42.5 g, Rfl: 5    LORazepam (Ativan) 1 mg tablet, 1 tablet each morning and 1/2 tablet each evening ., Disp: 45 tablet, Rfl: 1    omeprazole (PriLOSEC) 20 mg DR capsule, Take 1 capsule (20 mg) by mouth once daily in the morning. Take before meals., Disp: , Rfl:     peppermint oiL liquid, Apply topically., Disp: , Rfl:     psyllium seed, with sugar, (METAMUCIL, SUGAR, ORAL), ORAL, Refill(s) 0, Date: 07/08/2021 15:08:00 EDT, Disp: , Rfl:     sertraline (Zoloft) 25 mg tablet, Take 1 tablet (25 mg) by mouth once daily., Disp: 90 tablet, Rfl: 0  Medical History:  Past Medical History:   Diagnosis Date    Agoraphobia, unspecified 12/06/2016    Agoraphobia    Body mass index (BMI) 33.0-33.9, adult     BMI 33.0-33.9,adult    Chronic sinusitis, unspecified     Recurrent sinus infections    Coronary artery disease     Cutaneous abscess, unspecified 06/01/2016    Abscess    Disruption of external operation (surgical) wound, not elsewhere classified, initial encounter 07/31/2017    Open abdominal incision with drainage    Diverticulitis of intestine, part unspecified, without perforation or abscess without bleeding 08/13/2018    Acute diverticulitis    Encounter for immunization 04/21/2021    Encounter for immunization    Encounter for screening for lipoid disorders 04/26/2016    Screening cholesterol level    Hypercholesteremia     Hypertension     IgG deficiency (Multi) 05/03/2024    Incisional hernia without obstruction or gangrene 09/11/2015    Incisional hernia    Infection following a procedure, other surgical site, initial encounter 04/20/2017    Incisional infection    Local infection of the skin and subcutaneous tissue, unspecified 05/01/2019    Skin infection    Other conditions influencing health status     Complete Colonoscopy    Other specified postprocedural states 05/15/2018    History of incision and drainage    Otitis media, unspecified, left ear 05/19/2014    Otitis media, left    Pain in  right knee 02/16/2015    Bilateral knee pain    Personal history of other diseases of the circulatory system     History of hypertension    Personal history of other infectious and parasitic diseases 07/03/2018    History of onychomycosis    Personal history of other infectious and parasitic diseases 10/18/2019    History of Clostridioides difficile colitis    Personal history of other specified conditions 04/20/2017    History of dysuria    Personal history of other specified conditions 02/02/2017    History of nocturia    Personal history of other specified conditions 10/18/2019    History of diarrhea    Personal history of other specified conditions 12/06/2019    History of dysuria    Personal history of other specified conditions 02/16/2015    History of dyspnea    Personal history of urinary (tract) infections 12/11/2019    History of urinary tract infection    Squamous cell carcinoma of lower lobe of right lung (Multi) 01/24/2024    Syncope and collapse 01/05/2018    Collapse    Tinnitus, left ear 08/29/2014    Tinnitus of left ear    Unspecified open wound of abdominal wall, unspecified quadrant without penetration into peritoneal cavity, initial encounter 08/23/2019    Wound, open, abdominal wall, anterior     Surgical History:  Past Surgical History:   Procedure Laterality Date    CARPAL TUNNEL RELEASE  05/20/2014    Neuroplasty Decompression Median Nerve At Carpal Tunnel    CHOLECYSTECTOMY  05/20/2014    Cholecystectomy    COLONOSCOPY  01/08/2021    Complete Colonoscopy    CT GUIDED ASPIRATION OF ABSCESS, HEMATOMA, CYST  11/27/2023    CT GUIDED ASPIRATION OF ABSCESS, HEMATOMA, CYST 11/27/2023 PAR CT    CT GUIDED PERCUTANEOUS BIOPSY LUNG  11/27/2023    CT GUIDED PERCUTANEOUS BIOPSY LUNG 11/27/2023 PAR CT    HYSTERECTOMY  05/20/2014    Hysterectomy    KNEE ARTHROSCOPY W/ DEBRIDEMENT  02/16/2015    Knee Arthroscopy (Therapeutic)    OTHER SURGICAL HISTORY  08/20/2019    Incisional Hernia Repair    RECTAL  VAGINAL FISTULECTOMY  2014    Rectocele Repair     Family History:  Family History   Problem Relation Name Age of Onset    Hypertension Mother      Other (cardiac disorder) Father      Heart attack Maternal Grandfather       Social History:  Social History     Socioeconomic History    Marital status:      Spouse name: Not on file    Number of children: Not on file    Years of education: Not on file    Highest education level: Not on file   Occupational History    Not on file   Tobacco Use    Smoking status: Former     Current packs/day: 0.00     Average packs/day: 1 pack/day for 62.0 years (62.0 ttl pk-yrs)     Types: Cigarettes     Start date: 1961     Quit date: 2023     Years since quittin.2    Smokeless tobacco: Not on file   Substance and Sexual Activity    Alcohol use: Not Currently    Drug use: Never    Sexual activity: Not on file   Other Topics Concern    Not on file   Social History Narrative    Not on file     Social Drivers of Health     Financial Resource Strain: High Risk (2023)    Overall Financial Resource Strain (CARDIA)     Difficulty of Paying Living Expenses: Hard   Food Insecurity: Not on file   Transportation Needs: No Transportation Needs (2023)    PRAPARE - Transportation     Lack of Transportation (Medical): No     Lack of Transportation (Non-Medical): No   Physical Activity: Not on file   Stress: Not on file   Social Connections: Not on file   Intimate Partner Violence: Not on file   Housing Stability: Low Risk  (2023)    Housing Stability Vital Sign     Unable to Pay for Housing in the Last Year: No     Number of Places Lived in the Last Year: 1     Unstable Housing in the Last Year: No     Vitals:  There were no vitals filed for this visit.    Maday Wu, APRN-CNS

## 2025-01-30 ENCOUNTER — APPOINTMENT (OUTPATIENT)
Dept: BEHAVIORAL HEALTH | Facility: CLINIC | Age: 75
End: 2025-01-30
Payer: MEDICARE

## 2025-02-03 ENCOUNTER — TELEPHONE (OUTPATIENT)
Dept: BEHAVIORAL HEALTH | Facility: CLINIC | Age: 75
End: 2025-02-03
Payer: MEDICARE

## 2025-02-13 ENCOUNTER — APPOINTMENT (OUTPATIENT)
Dept: UROLOGY | Facility: CLINIC | Age: 75
End: 2025-02-13
Payer: MEDICARE

## 2025-02-26 RX ORDER — HEPARIN SODIUM,PORCINE/PF 10 UNIT/ML
50 SYRINGE (ML) INTRAVENOUS AS NEEDED
OUTPATIENT
Start: 2025-02-26

## 2025-02-26 RX ORDER — HEPARIN 100 UNIT/ML
500 SYRINGE INTRAVENOUS AS NEEDED
OUTPATIENT
Start: 2025-02-26

## 2025-02-27 ENCOUNTER — TELEPHONE (OUTPATIENT)
Dept: HEMATOLOGY/ONCOLOGY | Facility: CLINIC | Age: 75
End: 2025-02-27
Payer: MEDICARE

## 2025-02-27 NOTE — TELEPHONE ENCOUNTER
Patient with [place]. [place] [facility] and cancelling appointments for this week because of treatment for the C. Diff.  She will start oral vancomycin today, take for aprox. 2 weeks then call me with update. Advised she has to complete the vancomycin treatment and have formed stools before coming in for our appointments. We will schedule her for 3.19 and wait to hear how she's doing.

## 2025-02-28 ENCOUNTER — APPOINTMENT (OUTPATIENT)
Dept: HEMATOLOGY/ONCOLOGY | Facility: CLINIC | Age: 75
End: 2025-02-28
Payer: MEDICARE

## 2025-02-28 DIAGNOSIS — C34.11 SQUAMOUS CELL CARCINOMA OF UPPER LOBE OF RIGHT LUNG (MULTI): ICD-10-CM

## 2025-02-28 DIAGNOSIS — C34.31 SQUAMOUS CELL CARCINOMA OF LOWER LOBE OF RIGHT LUNG (MULTI): ICD-10-CM

## 2025-02-28 DIAGNOSIS — E03.2 HYPOTHYROIDISM DUE TO DRUGS: ICD-10-CM

## 2025-03-18 RX ORDER — ALBUTEROL SULFATE 0.83 MG/ML
3 SOLUTION RESPIRATORY (INHALATION) AS NEEDED
Status: CANCELLED | OUTPATIENT
Start: 2025-03-19

## 2025-03-18 RX ORDER — PROCHLORPERAZINE EDISYLATE 5 MG/ML
10 INJECTION INTRAMUSCULAR; INTRAVENOUS EVERY 6 HOURS PRN
Status: CANCELLED | OUTPATIENT
Start: 2025-03-19

## 2025-03-18 RX ORDER — EPINEPHRINE 0.3 MG/.3ML
0.3 INJECTION SUBCUTANEOUS EVERY 5 MIN PRN
Status: CANCELLED | OUTPATIENT
Start: 2025-03-19

## 2025-03-18 RX ORDER — DIPHENHYDRAMINE HYDROCHLORIDE 50 MG/ML
50 INJECTION, SOLUTION INTRAMUSCULAR; INTRAVENOUS AS NEEDED
Status: CANCELLED | OUTPATIENT
Start: 2025-03-19

## 2025-03-18 RX ORDER — FAMOTIDINE 10 MG/ML
20 INJECTION, SOLUTION INTRAVENOUS ONCE AS NEEDED
Status: CANCELLED | OUTPATIENT
Start: 2025-03-19

## 2025-03-18 RX ORDER — PROCHLORPERAZINE MALEATE 10 MG
10 TABLET ORAL EVERY 6 HOURS PRN
Status: CANCELLED | OUTPATIENT
Start: 2025-03-19

## 2025-03-19 ENCOUNTER — OFFICE VISIT (OUTPATIENT)
Dept: HEMATOLOGY/ONCOLOGY | Facility: CLINIC | Age: 75
End: 2025-03-19
Payer: MEDICARE

## 2025-03-19 ENCOUNTER — INFUSION (OUTPATIENT)
Dept: HEMATOLOGY/ONCOLOGY | Facility: CLINIC | Age: 75
End: 2025-03-19
Payer: MEDICARE

## 2025-03-19 ENCOUNTER — SOCIAL WORK (OUTPATIENT)
Dept: HEMATOLOGY/ONCOLOGY | Facility: CLINIC | Age: 75
End: 2025-03-19

## 2025-03-19 VITALS
WEIGHT: 152.12 LBS | SYSTOLIC BLOOD PRESSURE: 128 MMHG | OXYGEN SATURATION: 96 % | RESPIRATION RATE: 20 BRPM | BODY MASS INDEX: 29.86 KG/M2 | HEIGHT: 60 IN | HEART RATE: 86 BPM | TEMPERATURE: 99 F | DIASTOLIC BLOOD PRESSURE: 71 MMHG

## 2025-03-19 DIAGNOSIS — C34.31 SQUAMOUS CELL CARCINOMA OF LOWER LOBE OF RIGHT LUNG (MULTI): Primary | ICD-10-CM

## 2025-03-19 DIAGNOSIS — C34.31 SQUAMOUS CELL CARCINOMA OF LOWER LOBE OF RIGHT LUNG (MULTI): ICD-10-CM

## 2025-03-19 LAB
ALBUMIN SERPL BCP-MCNC: 3.8 G/DL (ref 3.4–5)
ALP SERPL-CCNC: 62 U/L (ref 33–136)
ALT SERPL W P-5'-P-CCNC: 11 U/L (ref 7–45)
ANION GAP SERPL CALC-SCNC: 14 MMOL/L (ref 10–20)
AST SERPL W P-5'-P-CCNC: 10 U/L (ref 9–39)
BASOPHILS # BLD AUTO: 0.05 X10*3/UL (ref 0–0.1)
BASOPHILS NFR BLD AUTO: 1 %
BILIRUB SERPL-MCNC: 0.5 MG/DL (ref 0–1.2)
BUN SERPL-MCNC: 16 MG/DL (ref 6–23)
CALCIUM SERPL-MCNC: 8.9 MG/DL (ref 8.6–10.3)
CHLORIDE SERPL-SCNC: 101 MMOL/L (ref 98–107)
CO2 SERPL-SCNC: 24 MMOL/L (ref 21–32)
CREAT SERPL-MCNC: 1.14 MG/DL (ref 0.5–1.05)
EGFRCR SERPLBLD CKD-EPI 2021: 50 ML/MIN/1.73M*2
EOSINOPHIL # BLD AUTO: 0.31 X10*3/UL (ref 0–0.4)
EOSINOPHIL NFR BLD AUTO: 6.4 %
ERYTHROCYTE [DISTWIDTH] IN BLOOD BY AUTOMATED COUNT: 14.2 % (ref 11.5–14.5)
GLUCOSE SERPL-MCNC: 425 MG/DL (ref 74–99)
HCT VFR BLD AUTO: 31.2 % (ref 36–46)
HGB BLD-MCNC: 9.6 G/DL (ref 12–16)
IMM GRANULOCYTES # BLD AUTO: 0.05 X10*3/UL (ref 0–0.5)
IMM GRANULOCYTES NFR BLD AUTO: 1 % (ref 0–0.9)
LYMPHOCYTES # BLD AUTO: 0.57 X10*3/UL (ref 0.8–3)
LYMPHOCYTES NFR BLD AUTO: 11.9 %
MCH RBC QN AUTO: 27.9 PG (ref 26–34)
MCHC RBC AUTO-ENTMCNC: 30.8 G/DL (ref 32–36)
MCV RBC AUTO: 91 FL (ref 80–100)
MONOCYTES # BLD AUTO: 0.3 X10*3/UL (ref 0.05–0.8)
MONOCYTES NFR BLD AUTO: 6.2 %
NEUTROPHILS # BLD AUTO: 3.53 X10*3/UL (ref 1.6–5.5)
NEUTROPHILS NFR BLD AUTO: 73.5 %
NRBC BLD-RTO: 0 /100 WBCS (ref 0–0)
PLATELET # BLD AUTO: 190 X10*3/UL (ref 150–450)
POTASSIUM SERPL-SCNC: 3.6 MMOL/L (ref 3.5–5.3)
PROT SERPL-MCNC: 6.5 G/DL (ref 6.4–8.2)
RBC # BLD AUTO: 3.44 X10*6/UL (ref 4–5.2)
SODIUM SERPL-SCNC: 135 MMOL/L (ref 136–145)
TSH SERPL-ACNC: 0.93 MIU/L (ref 0.44–3.98)
WBC # BLD AUTO: 4.8 X10*3/UL (ref 4.4–11.3)

## 2025-03-19 PROCEDURE — 1126F AMNT PAIN NOTED NONE PRSNT: CPT | Performed by: INTERNAL MEDICINE

## 2025-03-19 PROCEDURE — 80053 COMPREHEN METABOLIC PANEL: CPT

## 2025-03-19 PROCEDURE — 2500000004 HC RX 250 GENERAL PHARMACY W/ HCPCS (ALT 636 FOR OP/ED): Mod: JZ,TB | Performed by: INTERNAL MEDICINE

## 2025-03-19 PROCEDURE — 85025 COMPLETE CBC W/AUTO DIFF WBC: CPT

## 2025-03-19 PROCEDURE — 1159F MED LIST DOCD IN RCRD: CPT | Performed by: INTERNAL MEDICINE

## 2025-03-19 PROCEDURE — 96413 CHEMO IV INFUSION 1 HR: CPT

## 2025-03-19 PROCEDURE — 2500000004 HC RX 250 GENERAL PHARMACY W/ HCPCS (ALT 636 FOR OP/ED): Performed by: INTERNAL MEDICINE

## 2025-03-19 PROCEDURE — 99214 OFFICE O/P EST MOD 30 MIN: CPT | Mod: 25 | Performed by: INTERNAL MEDICINE

## 2025-03-19 PROCEDURE — 36591 DRAW BLOOD OFF VENOUS DEVICE: CPT

## 2025-03-19 PROCEDURE — 99214 OFFICE O/P EST MOD 30 MIN: CPT | Performed by: INTERNAL MEDICINE

## 2025-03-19 PROCEDURE — 84443 ASSAY THYROID STIM HORMONE: CPT

## 2025-03-19 RX ORDER — HEPARIN SODIUM,PORCINE/PF 10 UNIT/ML
50 SYRINGE (ML) INTRAVENOUS AS NEEDED
OUTPATIENT
Start: 2025-03-19

## 2025-03-19 RX ORDER — HEPARIN 100 UNIT/ML
500 SYRINGE INTRAVENOUS AS NEEDED
Status: CANCELLED | OUTPATIENT
Start: 2025-03-19

## 2025-03-19 RX ORDER — HEPARIN SODIUM,PORCINE/PF 10 UNIT/ML
50 SYRINGE (ML) INTRAVENOUS AS NEEDED
Status: DISCONTINUED | OUTPATIENT
Start: 2025-03-19 | End: 2025-03-19 | Stop reason: HOSPADM

## 2025-03-19 RX ORDER — HEPARIN SODIUM,PORCINE/PF 10 UNIT/ML
50 SYRINGE (ML) INTRAVENOUS AS NEEDED
Status: CANCELLED | OUTPATIENT
Start: 2025-03-19

## 2025-03-19 RX ORDER — HEPARIN 100 UNIT/ML
500 SYRINGE INTRAVENOUS AS NEEDED
Status: DISCONTINUED | OUTPATIENT
Start: 2025-03-19 | End: 2025-03-19 | Stop reason: HOSPADM

## 2025-03-19 RX ORDER — HEPARIN 100 UNIT/ML
500 SYRINGE INTRAVENOUS AS NEEDED
OUTPATIENT
Start: 2025-03-19

## 2025-03-19 RX ADMIN — SODIUM CHLORIDE 400 MG: 9 INJECTION, SOLUTION INTRAVENOUS at 12:20

## 2025-03-19 ASSESSMENT — PAIN SCALES - GENERAL: PAINLEVEL_OUTOF10: 0-NO PAIN

## 2025-03-19 NOTE — PROGRESS NOTES
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  TP53 mutated.        a. Ist line therapy with carboplatin/Taxol/Keytruda X 4 cycles from Feb to May 2024.        b. F/U PET scan from May 2024 with excellent response.          c. S/p stereotactic radiation to the brain lesion in March 2024.        d.  Maintenance immunotherapy with Keytruda beginning May 2024    CHIEF COMPLAINTS:  Patient is in the clinic today for evaluation of right lung squamous cell carcinoma and for continuation of the therapy and management of therapy-related effects.    HISTORY:  Janee Maddox is a 75 y.o. female with right lung squamous cell carcinoma.  She is a lifelong smoker and was admitted at [facility] in Nov 2023 with complaints of shortness of breath, chest discomfort and positive bacteremia.  During the evaluation she was noted to have a large cavitary lesion in the right lung along with pleural effusion.  CT scan at that time showed a 6.1 x 5 cm right lower lobe cavitary lung mass along with loculated right-sided pleural effusion and prominent mediastinal and hilar lymphadenopathy.  Overall findings were concerning for either abscess or baseline malignancy.  She was transferred to [facility] where a CT-guided biopsy confirmed invasive squamous cell carcinoma.  PD-L1 expression was 5%.  Tumor NGS panel was mainly suggestive of TP53 mutation.  During hospitalization her clinical course was also complicated by Pantoea bacteremia and an ESBL Ecoli UTI.  She was treated with antibiotics and other supportive care.  A follow-up PET scan from Jan 2024 confirmed increased metabolic activity in the right lower lobe pleural-based cavitary mass along with mediastinal and right hilar lymphadenopathy and right-sided pleural disease  consistent with known malignancy.  There was no evidence of distant metastatic disease.  Brain MRI showed small solitary left parietal metastatic lesion and she is s/p stereotactic radiation treatment in 2024.  She was treated with first-line therapy with carbo/Taxol/Keytruda  X 4 cycles from Feb to May 2024.  Treatment course was complicated by recurrent UTI and other issues.  Posttreatment follow-up PET scan from May 2024 showed excellent response.  She is currently being treated with maintenance immunotherapy with Keytruda since May 2024.    INTERVAL HISTORY:  She is tolerating immunotherapy well. Still has issues with recurrent UTIs.  Follow-up PET scan and brain MRI results are unremarkable.  Overall significant improvement in her quality of life and functionality compared to the time when she was diagnosed with lung cancer.      PAST MEDICAL HISTORY:  1.  Right lower lobe squamous cell lung cancer as detailed above.  2.  Coronary artery disease  3.  Hypertension  4.  Hyperlipidemia  5.  GERD  6.  Anxiety/depression  7.  History of C. difficile colitis  8.  E. coli UTI.  9.  History of complete hysterectomy, cholecystectomy and incisional hernia surgeries    SOCIAL HISTORY:  She lives alone in [place].  Quit smoking in 2023.  1 pack a day for 60 years prior smoking history.  Admit to occasional alcohol intake.  She work as a tax preparor.  Born and raised in [place].    FAMILY HISTORY:  Father  at age 86 from coronary artery disease. Mother also  at age 86 from multiple medical issues.  She had 1 sister and 4/2 siblings.  One of them  from myocardial infarction.  3 children, 7 grandkids and 1 great grand kid ~ all alive and well.  No other family history of bleeding, clotting or malignant disorders in the family history.    REVIEW OF SYSTEMS:   Pertinent finding as per the history above.  All other systems have been reviewed and generally negative and noncontributory.    VITAL  "SIGNS  BSA: 1.71 meters squared  /71   Pulse 86   Temp 37.2 °C (99 °F)   Resp 20   Ht (S) 1.528 m (5' 0.16\")   Wt 69 kg (152 lb 1.9 oz)   SpO2 96%   BMI 29.55 kg/m²     PHYSICAL EXAMINATION:  Detailed physical examination not done.    LAB DATA:  CBC from today shows a hemoglobin of 9.6 with MCV of 91.  White cell count is normal at 4.8 and platelets are 190.  Metabolic profile and TSH are pending.  Last 3 sets of blood work were reviewed and the trend was noted.    RADIOLOGICAL DATA:  Brain MRI 1/13/2025   Impression:  No new mass or pathologic enhancement to suggest intracranial metastatic disease was identified. 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     PET scan 12/26/2024  Impression:  1. Interval mild decrease in size of right lower lobe cavitary mass, with mild decrease in extent of hypermetabolic activity extending to  the right chest wall, invading into the ribs. However, the intensity of activity at this site has increased as compared to prior, which is  concerning for disease progression.  2. No evidence of hypermetabolic lymphadenopathy, with continued decrease in hypermetabolic activity of mediastinal lymph nodes, when compared to prior.  3. No evidence of new hypermetabolic activity throughout the body to suggest new metastatic disease.     PATHOLOGY DATA:  Surgical Pathology Exam: [id number]   FINAL DIAGNOSIS   A. LUNG, RIGHT; BIOPSY:    -- INVASIVE SQUAMOUS CELL CARCINOMA, KERATINIZING. See comment   Electronically signed by Lino Carrillo MD on 12/1/2023      PD-L1 22C3  (KEYTRUDA)Tumor Proportion Score (TPS): 5%   MICROSATELLITE STATUS: Microsatellite Instability-High (MSI-H) is NOT DETECTED.  DISEASE ASSOCIATED GENOMIC FINDINGS:   TP53 p.G245V ([id number] c.734G>T)  DISEASE RELEVANT ALTERATIONS NOT DETECTED:   Negative for ALK fusion.  Negative for BRAF V600E.  Negative for EGFR sensitizing mutation.  Negative for ERBB2 activating mutation  Negative for KRAS G12C.  Negative for " MET exon 14 skipping mutation.  Negative for NTRK fusion.  Negative for RET fusion.  Negative for ROS1 fusion.    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  TP53 mutated.  Please refer  to the details of initial presentation and management as outlined above.  In summary she is a lifelong smoker and was noted to have a large cavitary right lower lung pleural-based lesion along with extensive mediastinal and hilar lymphadenopathy and loculated pleural effusion in November 2023.  CT-guided biopsy was confirmatory of invasive squamous cell carcinoma.  PD-L1 TPS score was 5%.  NGS panel was unremarkable other than finding of TP53 mutation.  Brain MRI showed a small 3 mm left frontal lobe lesion concerning for metastatic disease.  She received stereotactic brain radiation in March 2024 and was treated with first-line therapy with carbo/Taxol/Keytruda X 4 cycles from Feb to May 2024.  Treatment course was complicated by recurrent UTI and other issues.  Posttreatment follow-up PET scan from May 2024 showed excellent response.  She is currently being treated with maintenance immunotherapy with Keytruda since May 2024.    She will continue with maintenance immunotherapy with Keytruda at 6 weekly interval.  Probable benefit and side effect profile of mainly  weakness, fatigue, colitis, pneumonitis, endocrinopathies, transaminitis etc. were explained to them in detail.  Her overall prognosis remain guarded.    2.  Brain mets.  MRI results were suggestive of small solitary left parietal lobe lesion.  She is s/p stereotactic radiation therapy in Mar 2024. She will continue to F/U with radiation oncology team.    3.  Recurrent UTI.  She is being closely followed by ID team and is being treated with frequent antibiotic courses.    4.  Diarrhea.  She has been treated for C diff colitis with improvement in diarrhea.  She will continue to follow up with GI team.    5.  Follow-up.  Follow-up  with me in 6 weeks.    This note has been transcribed using Dragon voice recognition system and there is a possibility of unintentional typing misprints.

## 2025-03-19 NOTE — PROGRESS NOTES
Followed up with pt. Pt here for keytruda today. Pt doing well with treatment and really not having any issues. Pt continues though to have issues with getting a UTI within every two weeks and is on constant antibiotics and then gets c diff. The pt has been dealing with this and it has been hard on her. She has had to postpone her  river cruise with her cousin because she worries she will not have what she needs if she is trapped over there on a boat. Pt still going to do something but closer to home. Talked to pt about the anxiety alone with being over there would increase her stress and her immune system and effects. Pt agrees. Pt for the most part doing well aside for her chronic UTIs. Had pt sign HCAP application and also collected income proof for  [employer]aked[employer] Page. All items emailed to  on pt's behalf.

## 2025-04-10 ENCOUNTER — APPOINTMENT (OUTPATIENT)
Dept: HEMATOLOGY/ONCOLOGY | Facility: CLINIC | Age: 75
End: 2025-04-10
Payer: MEDICARE

## 2025-04-17 ENCOUNTER — APPOINTMENT (OUTPATIENT)
Dept: UROLOGY | Facility: CLINIC | Age: 75
End: 2025-04-17
Payer: MEDICARE

## 2025-04-18 ENCOUNTER — APPOINTMENT (OUTPATIENT)
Dept: HEMATOLOGY/ONCOLOGY | Facility: CLINIC | Age: 75
End: 2025-04-18
Payer: MEDICARE

## 2025-04-22 ENCOUNTER — HOSPITAL ENCOUNTER (OUTPATIENT)
Dept: RADIOLOGY | Facility: CLINIC | Age: 75
Discharge: HOME | End: 2025-04-22
Payer: MEDICARE

## 2025-04-22 DIAGNOSIS — C79.31 MALIGNANT NEOPLASM METASTATIC TO BRAIN (MULTI): ICD-10-CM

## 2025-04-22 PROCEDURE — 70553 MRI BRAIN STEM W/O & W/DYE: CPT

## 2025-04-22 PROCEDURE — A9575 INJ GADOTERATE MEGLUMI 0.1ML: HCPCS | Mod: JZ

## 2025-04-22 PROCEDURE — 2550000001 HC RX 255 CONTRASTS: Mod: JZ

## 2025-04-22 RX ORDER — GADOTERATE MEGLUMINE 376.9 MG/ML
14 INJECTION INTRAVENOUS
Status: COMPLETED | OUTPATIENT
Start: 2025-04-22 | End: 2025-04-22

## 2025-04-22 RX ADMIN — GADOTERATE MEGLUMINE 14 ML: 376.9 INJECTION INTRAVENOUS at 11:30

## 2025-04-23 ENCOUNTER — TELEPHONE (OUTPATIENT)
Dept: RADIATION ONCOLOGY | Facility: HOSPITAL | Age: 75
End: 2025-04-23
Payer: MEDICARE

## 2025-04-23 ASSESSMENT — ENCOUNTER SYMPTOMS
FATIGUE: 0
ABDOMINAL DISTENTION: 0
CONFUSION: 0
WHEEZING: 0
LEG SWELLING: 0
TROUBLE SWALLOWING: 0
DIARRHEA: 0
LIGHT-HEADEDNESS: 0
NERVOUS/ANXIOUS: 0
DEPRESSION: 0
CONSTIPATION: 0
HEADACHES: 0
ADENOPATHY: 0
FEVER: 0
EXTREMITY WEAKNESS: 1
BACK PAIN: 0
CHILLS: 0
SHORTNESS OF BREATH: 0
VOMITING: 0
NECK PAIN: 0
NECK STIFFNESS: 0
DIZZINESS: 0
HEMATURIA: 0
CHEST TIGHTNESS: 0
DIFFICULTY URINATING: 0
ABDOMINAL PAIN: 0
SPEECH DIFFICULTY: 0
EYE PROBLEMS: 0
NAUSEA: 0
SEIZURES: 0
ARTHRALGIAS: 0

## 2025-04-23 NOTE — TELEPHONE ENCOUNTER
[facility]. Pt's phone went to voicemail left number if needs to reschedule.

## 2025-04-24 ENCOUNTER — HOSPITAL ENCOUNTER (OUTPATIENT)
Dept: RADIATION ONCOLOGY | Facility: HOSPITAL | Age: 75
Setting detail: RADIATION/ONCOLOGY SERIES
Discharge: HOME | End: 2025-04-24
Payer: MEDICARE

## 2025-04-24 DIAGNOSIS — C79.31 MALIGNANT NEOPLASM METASTATIC TO BRAIN (MULTI): ICD-10-CM

## 2025-04-24 PROCEDURE — 99214 OFFICE O/P EST MOD 30 MIN: CPT

## 2025-04-24 PROCEDURE — 99214 OFFICE O/P EST MOD 30 MIN: CPT | Mod: 95

## 2025-04-24 ASSESSMENT — ENCOUNTER SYMPTOMS: COUGH: 0

## 2025-04-24 NOTE — PROGRESS NOTES
Radiation Oncology Follow-Up    Patient Name:  Janee Maddox  MRN:  [id number]  :  1950    Referring Provider: Myron Rothman, *  Primary Care Provider:   Care Team: Patient Care Team:  Frances Cervantes MD as PCP - General (Internal Medicine)  Frances Cervantes MD as PCP - [employer] PCP  Prasanth Gonsales MD as Consulting Physician (Hematology and Oncology)  Shalom Wen MD as Consulting Physician (Hematology and Oncology)  Kenia Jackson RN as Registered Nurse (Hematology and Oncology)  HATTIE Kang-CNS as Nurse Practitioner (Geriatric Psychiatry)      Date of Service: 2025    HEMATOLOGY ONCOLOGY PROBLEMS:  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  TP53 mutated.        a. Ist line therapy with carboplatin/Taxol/Keytruda X 4 cycles from Feb to May 2024.        b. F/U PET scan from May 2024 with excellent response.          c. S/p stereotactic radiation to the brain lesion in 2024.        d.  Maintenance immunotherapy with Keytruda beginning May 2024    HISTORY:   74 y.o. female with right lung squamous cell carcinoma.  She is a lifelong smoker and was admitted at [facility] in 2023 with complaints of shortness of breath, chest discomfort and positive bacteremia.  During the evaluation she was noted to have a large cavitary lesion in the right lung along with pleural effusion.  CT scan at that time showed a 6.1 x 5 cm right lower lobe cavitary lung mass along with loculated right-sided pleural effusion and prominent mediastinal and hilar lymphadenopathy.  Overall findings were concerning for either abscess or baseline malignancy.       She was transferred to [facility] where a CT-guided biopsy confirmed invasive squamous cell carcinoma.  PD-L1 expression was 5%.  Tumor NGS panel was mainly suggestive of TP53 mutation.  During hospitalization her clinical course was also complicated by Pantoea bacteremia and an ESBL Ecoli  UTI.  She was treated with antibiotics and other supportive care.       A follow-up PET scan from Jan 2024 confirmed increased metabolic activity in the right lower lobe pleural-based cavitary mass along with mediastinal and right hilar lymphadenopathy and right-sided pleural disease consistent with known malignancy.  There was no evidence of distant metastatic disease.       Brain MRI showed small solitary left parietal metastatic lesion and she is s/p stereotactic radiation treatment in March 2024 to 3 lesions.      She was treated with first-line therapy with carbo/Taxol/Keytruda  X 4 cycles from Feb to May 2024.  Treatment course was complicated by recurrent UTI and other issues.  Posttreatment follow-up PET scan from May 2024 showed excellent response.  She is currently being treated with maintenance immunotherapy with Keytruda since May 2024.    SRS: Not Applicable Brain    Treatment Period Technique Fraction Dose Fractions Total Dose   Course 1 3/19/2024-3/19/2024  (days elapsed: 0)         A:LF62 3/19/2024-3/19/2024 Gamma-Knife 2200 / 2,200 cGy 1 / 1 2200 / 2,200 cGy         B: 3/19/2024-3/19/2024 Gamma-Knife 2200 / 2,200 cGy 1 / 1 2200 / 2,200 cGy         C:LP97 3/19/2024-3/19/2024 Gamma-Knife 2200 / 2,200 cGy 1 / 1 2200 / 2,200 cGy     Past Medical History:   Diagnosis Date    Agoraphobia, unspecified 12/06/2016    Agoraphobia    Body mass index (BMI) 33.0-33.9, adult     BMI 33.0-33.9,adult    Chronic sinusitis, unspecified     Recurrent sinus infections    Coronary artery disease     Cutaneous abscess, unspecified 06/01/2016    Abscess    Disruption of external operation (surgical) wound, not elsewhere classified, initial encounter 07/31/2017    Open abdominal incision with drainage    Diverticulitis of intestine, part unspecified, without perforation or abscess without bleeding 08/13/2018    Acute diverticulitis    Encounter for immunization 04/21/2021    Encounter for immunization    Encounter for  screening for lipoid disorders 04/26/2016    Screening cholesterol level    Hypercholesteremia     Hypertension     IgG deficiency (Multi) 05/03/2024    Incisional hernia without obstruction or gangrene 09/11/2015    Incisional hernia    Infection following a procedure, other surgical site, initial encounter 04/20/2017    Incisional infection    Local infection of the skin and subcutaneous tissue, unspecified 05/01/2019    Skin infection    Other conditions influencing health status     Complete Colonoscopy    Other specified postprocedural states 05/15/2018    History of incision and drainage    Otitis media, unspecified, left ear 05/19/2014    Otitis media, left    Pain in right knee 02/16/2015    Bilateral knee pain    Personal history of other diseases of the circulatory system     History of hypertension    Personal history of other infectious and parasitic diseases 07/03/2018    History of onychomycosis    Personal history of other infectious and parasitic diseases 10/18/2019    History of Clostridioides difficile colitis    Personal history of other specified conditions 04/20/2017    History of dysuria    Personal history of other specified conditions 02/02/2017    History of nocturia    Personal history of other specified conditions 10/18/2019    History of diarrhea    Personal history of other specified conditions 12/06/2019    History of dysuria    Personal history of other specified conditions 02/16/2015    History of dyspnea    Personal history of urinary (tract) infections 12/11/2019    History of urinary tract infection    Squamous cell carcinoma of lower lobe of right lung (Multi) 01/24/2024    Syncope and collapse 01/05/2018    Collapse    Tinnitus, left ear 08/29/2014    Tinnitus of left ear    Unspecified open wound of abdominal wall, unspecified quadrant without penetration into peritoneal cavity, initial encounter 08/23/2019    Wound, open, abdominal wall, anterior      Past Surgical History:    Procedure Laterality Date    CARPAL TUNNEL RELEASE  2014    Neuroplasty Decompression Median Nerve At Carpal Tunnel    CHOLECYSTECTOMY  2014    Cholecystectomy    COLONOSCOPY  2021    Complete Colonoscopy    CT GUIDED ASPIRATION OF ABSCESS, HEMATOMA, CYST  2023    CT GUIDED ASPIRATION OF ABSCESS, HEMATOMA, CYST 2023 PAR CT    CT GUIDED PERCUTANEOUS BIOPSY LUNG  2023    CT GUIDED PERCUTANEOUS BIOPSY LUNG 2023 PAR CT    HYSTERECTOMY  2014    Hysterectomy    KNEE ARTHROSCOPY W/ DEBRIDEMENT  2015    Knee Arthroscopy (Therapeutic)    OTHER SURGICAL HISTORY  2019    Incisional Hernia Repair    RECTAL VAGINAL FISTULECTOMY  2014    Rectocele Repair      Social History     Tobacco Use    Smoking status: Former     Current packs/day: 0.00     Average packs/day: 1 pack/day for 62.0 years (62.0 ttl pk-yrs)     Types: Cigarettes     Start date: 1961     Quit date: 2023     Years since quittin.4    Smokeless tobacco: Not on file   Substance Use Topics    Alcohol use: Not Currently      SUBJECTIVE  History of Present Illness (Virtual visit with the patient at her home with me at the [facility]):   Janee Maddox is a 75 y.o. female who was previously seen at the [facility] Department of Radiation Oncology for metastatic 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  Today the patient is in clinic for a routine Radiation Oncology FU visit and review of an MRI completed on 25.  Per a Radiology read, there is no evidence of mass, infarction or hemorrhage.  Today the patient states that she's doing okay.  She states that she's been having some intermittent dizziness.  She does have a remote history of vertigo but hasn't had it for many years now.  She will be discussing with Dr. Gonsales at next weeks visit.  She also reports of some generalized weakness in her b/l legs which have been stable.   Denies headaches, vision changes, or new focal sensory/motor deficits.  Endorses being recently treated for CDIFF with Vancomycin.  Continues to be treated systemically with Pembrolizumab under the direction of Dru.       1/16/25 Interval Visit:    An interactive audio and video telecommunication system which permits real time communications between the patient (at the originating site) and provider (at the distant site) was utilized to provide this telehealth service.   Verbal consent was requested and obtained from Janee Maddox on this date, 04/23/25 for a telehealth visit.      Today the patient is connecting with me virtually for a routine Radiation Oncology FU visit and review of an MRI of the brain completed on 1/13/24.  Per a Radiology read, there is no new mass or pathologic enhancement to suggest intracranial  metastatic disease was identified. 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 This was discussed with the patient.     Overall, the patient states that she's doing well and looking forward to a  river cruise in June, 2025.  The patient continues to have some generalized weakness in her LE that she attributes to being deconditioned from her prior treatments.  She denies seizures, headaches, vision changes, dizziness, instability or new focal sensory/motor deficits.  Denies chills, fever, SOB or chest pain.  She does endorse a new cough that his mildly productive.  Denies abdominal pain, nausea, vomiting, diarrhea or constipation.  She does endorse a chronic UTI and is currently following with Infective Disease for management.     4/24/25 Interval FU visit:   An interactive audio and video telecommunication system which permits real time communications between the patient (at the originating site) and provider (at the distant site) was utilized to provide this telehealth service. Verbal consent was requested and obtained from Janee Maddox on this date, 04/23/25 for a  telehealth visit.    Today the patient is in clinic for a routine Radiation Oncology FU visit and review of an MRI brain completed on 4/22/25.  Per a radiology read, there is no acute intracranial abnormality and no evidence of metastatic lesions.  This was discussed with the patient.  Overall, the patient continues to do well.  She continues to have some generalized weakness in her LEs that she attributes to being deconditioned from her prior treatments.  She also endorses chronic UTI that she believes are related to pembrolizumab.  She denies seizures, headaches, vision changes, dizziness, instability or new focal sensory/motor deficits.  Denies chills, fever, SOB or chest pain.   Denies abdominal pain, nausea, vomiting, diarrhea or constipation.     Review of Systems:   Review of Systems   Constitutional:  Negative for chills, fatigue and fever.   HENT:   Negative for hearing loss and trouble swallowing.    Eyes:  Negative for eye problems.   Respiratory:  Negative for chest tightness, cough, shortness of breath and wheezing.    Cardiovascular:  Negative for chest pain and leg swelling.   Gastrointestinal:  Negative for abdominal distention, abdominal pain, constipation, diarrhea, nausea and vomiting.   Genitourinary:  Negative for difficulty urinating and hematuria.         Chronic UTI.   Musculoskeletal:  Negative for arthralgias, back pain, neck pain and neck stiffness.   Skin:  Negative for rash.   Neurological:  Positive for extremity weakness (Generalized weakness of the LE). Negative for dizziness, headaches, light-headedness, seizures and speech difficulty.   Hematological:  Negative for adenopathy.   Psychiatric/Behavioral:  Negative for confusion and depression. The patient is not nervous/anxious.      Performance Status:   The Karnofsky performance scale today is 100, Fully active, able to carry on all pre-disease performed without restriction (ECOG equivalent 0).    Pain: Patient doesn't c/o pain  "today.     OBJECTIVE  Vital Signs:      10/2/2024    11:23 AM 10/2/2024     1:00 PM 10/3/2024    11:45 AM 1/13/2025    10:53 AM 1/17/2025     1:09 PM 1/17/2025     3:10 PM 3/19/2025    11:00 AM   Vitals   Systolic 121 113 101  127 122 128   Diastolic 70 61 60  62 61 71   Heart Rate 90 78 88  82 76 86   Temp 36.3 °C (97.3 °F) 36.5 °C (97.7 °F) 36.3 °C (97.3 °F)  36.7 °C (98.1 °F) 36.5 °C (97.7 °F) 37.2 °C (99 °F)   Resp 20 18   20 18 20   Height   1.549 m (5' 1\") 1.549 m (5' 1\")   1.528 m (5' 0.16\")    Weight (lb) 149.91  149 155 158.73  152.12   BMI 28.33 kg/m2  28.15 kg/m2 29.29 kg/m2 29.99 kg/m2  29.55 kg/m2   BSA (m2) 1.71 m2  1.71 m2 1.74 m2 1.76 m2  1.71 m2   Visit Report Report Report Report  Report Report Report       Significant value      Physical Exam    MR BRAIN W AND WO IV CONTRAST; 4/22/2025 11:51 am   IMPRESSION  1. Stable examination. No acute intracranial abnormality. No evidence  of metastatic lesions.    MR BRAIN W AND WO IV CONTRAST; 1/13/2025 11:52 am    IMPRESSION:  No new mass or pathologic enhancement to suggest intracranial  metastatic disease was identified. 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    ASSESSMENT:   75 y.o. female who was previously seen at the [facility] Department of Radiation Oncology for metastatic 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  An MRI of the brain completed on 4/22/25 shows LIVIA.  We discussed completing an MRI in 4 months and the patient was amenable.  All questions/concerns were addressed to the satisfaction of the patient.     PLAN:      --MRI in 4 months.   --FU visit with Mamadou Rothman CNP in 4 months.   --Continue to follow with Dr. Gonsales for Medical Oncology management.     Please reach out with any questions or concerns.   NCCN Guidelines were applicable to guide this patients treatment plan.  Myron Rothman, APRN-CNP     "

## 2025-04-25 ENCOUNTER — APPOINTMENT (OUTPATIENT)
Dept: BEHAVIORAL HEALTH | Facility: CLINIC | Age: 75
End: 2025-04-25
Payer: MEDICARE

## 2025-04-25 DIAGNOSIS — F33.0 MILD RECURRENT MAJOR DEPRESSION (CMS-HCC): ICD-10-CM

## 2025-04-25 DIAGNOSIS — F41.1 GAD (GENERALIZED ANXIETY DISORDER): ICD-10-CM

## 2025-04-25 DIAGNOSIS — G47.09 OTHER INSOMNIA: ICD-10-CM

## 2025-04-25 PROCEDURE — 99214 OFFICE O/P EST MOD 30 MIN: CPT | Performed by: CLINICAL NURSE SPECIALIST

## 2025-04-25 RX ORDER — SERTRALINE HYDROCHLORIDE 100 MG/1
100 TABLET, FILM COATED ORAL DAILY
Qty: 90 TABLET | Refills: 0 | Status: SHIPPED | OUTPATIENT
Start: 2025-04-25 | End: 2025-07-24

## 2025-04-25 RX ORDER — BUPROPION HYDROCHLORIDE 300 MG/1
300 TABLET ORAL EVERY MORNING
Qty: 90 TABLET | Refills: 0 | Status: SHIPPED | OUTPATIENT
Start: 2025-04-25 | End: 2025-07-24

## 2025-04-25 RX ORDER — LORAZEPAM 1 MG/1
TABLET ORAL
Qty: 45 TABLET | Refills: 2 | Status: SHIPPED | OUTPATIENT
Start: 2025-04-25

## 2025-04-25 NOTE — PROGRESS NOTES
Outpatient Psychiatry      Subjective   Janee Maddox, a 75 y.o. female, presents as an established patient for an appointment with outpatient psychiatry for follow up/medication management  for an audio and video virtual appointment      Virtual or Telephone Consent     An interactive audio and video telecommunication system which permits real time communications between the patient (at the originating site) and provider (at the distant site) was utilized to provide this telehealth service.   Verbal consent was requested and obtained from Janee Maddox on this date, 4/25/25 for a telehealth visit.       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  can follow up for medications in 10-12  weeks. can continue self care and wellness efforts and maintain other appointments with other medical providers.   Psychotropic medications :  Continue  buPROPion XL (Wellbutrin XL) 300 mg 24 hr tablet, Take 1 tablet (300 mg) by mouth once daily in the morning. For depression   Continue and increase sertraline 25 mg , 2 tablets daily for a week then 100 mg , 1 tablet daily for depression and anxiety   Continue LORazepam (Ativan) 1 mg tablet, 1 tablet each morning and 1/2 tablet each night for anxiety      Review with patient: Treatment plan reviewed with the patient.  Medication risks/benefit reviewed with the patient     HPI:  Patient has been seeing oncology she completed  chemotherapy , for squamous cell carcinoma, she is on keytruda   She just had an mri that came back fine she says   She is having issues with panic attacks , she says she gets worried she will get shaky with driving , she gets afraid she will get dizzy also , she does not have dizziness at home , she is not driving longer distances , she has people whom can drive her most places   No  cognitive changes   No falls recently   Reviewed notes in the [facility] per other medical providers   Reviewed labs in the [facility]   No issues with substance abuse   Takes precautions to prevent falls    She started walking outside   No issues with substance abuse   Does not drink alcohol  No use of cbd or thc products       I have personally reviewed the OARRS report for JIMENA SINHA. I have considered the risks of abuse, dependence, addiction and diversion.   I have the following concerns: no concerns on oarrs. No concerns , however will    Is the patient prescribed a combination of a benzodiazepine and opioid? No.   Last urine drug screening date/ordered , uds  2024 per another provider , results as expected , will defer uds , she has ongoing issues with utis   Date of the last Controlled Substance Agreement: signed csa in person on 10/24/23, reviewed verbally 25, and will do an in person appointment 2025   BENZODIAZEPINES   What is the patient's goal of therapy? to manage pepe and support daily functioning.  Is this being achieved with current treatment? yes , she can attend to daily activities without interruption of intense anxiety.      [facility] as noted above            Social History     Socioeconomic History    Marital status:      Spouse name: Not on file    Number of children: Not on file    Years of education: Not on file    Highest education level: Not on file   Occupational History    Not on file   Tobacco Use    Smoking status: Former     Current packs/day: 0.00     Average packs/day: 1 pack/day for 62.0 years (62.0 ttl pk-yrs)     Types: Cigarettes     Start date: 1961     Quit date: 2023     Years since quittin.4    Smokeless tobacco: Not on file   Substance and Sexual Activity    Alcohol use: Not Currently    Drug use: Never    Sexual activity: Not on file   Other Topics Concern    Not on file   Social History Narrative    Not on file      Social Drivers of Health     Financial Resource Strain: High Risk (11/25/2023)    Overall Financial Resource Strain (CARDIA)     Difficulty of Paying Living Expenses: Hard   Food Insecurity: Not on file   Transportation Needs: No Transportation Needs (11/25/2023)    PRAPARE - Transportation     Lack of Transportation (Medical): No     Lack of Transportation (Non-Medical): No   Physical Activity: Not on file   Stress: Not on file   Social Connections: Not on file   Intimate Partner Violence: Not on file   Housing Stability: Low Risk  (11/25/2023)    Housing Stability Vital Sign     Unable to Pay for Housing in the Last Year: No     Number of Places Lived in the Last Year: 1     Unstable Housing in the Last Year: No     Vitals:  There were no vitals filed for this visit.    Maday Wu, APRN-CNS

## 2025-04-29 RX ORDER — PROCHLORPERAZINE EDISYLATE 5 MG/ML
10 INJECTION INTRAMUSCULAR; INTRAVENOUS EVERY 6 HOURS PRN
Status: CANCELLED | OUTPATIENT
Start: 2025-04-30

## 2025-04-29 RX ORDER — PROCHLORPERAZINE MALEATE 10 MG
10 TABLET ORAL EVERY 6 HOURS PRN
Status: CANCELLED | OUTPATIENT
Start: 2025-04-30

## 2025-04-30 ENCOUNTER — INFUSION (OUTPATIENT)
Dept: HEMATOLOGY/ONCOLOGY | Facility: CLINIC | Age: 75
End: 2025-04-30
Payer: MEDICARE

## 2025-04-30 ENCOUNTER — SOCIAL WORK (OUTPATIENT)
Dept: HEMATOLOGY/ONCOLOGY | Facility: CLINIC | Age: 75
End: 2025-04-30

## 2025-04-30 ENCOUNTER — OFFICE VISIT (OUTPATIENT)
Dept: HEMATOLOGY/ONCOLOGY | Facility: CLINIC | Age: 75
End: 2025-04-30
Payer: MEDICARE

## 2025-04-30 VITALS
TEMPERATURE: 98.6 F | DIASTOLIC BLOOD PRESSURE: 71 MMHG | HEART RATE: 85 BPM | SYSTOLIC BLOOD PRESSURE: 148 MMHG | RESPIRATION RATE: 18 BRPM | OXYGEN SATURATION: 94 %

## 2025-04-30 VITALS
BODY MASS INDEX: 29 KG/M2 | WEIGHT: 147.71 LBS | RESPIRATION RATE: 20 BRPM | OXYGEN SATURATION: 95 % | TEMPERATURE: 97.3 F | HEIGHT: 60 IN | DIASTOLIC BLOOD PRESSURE: 81 MMHG | HEART RATE: 86 BPM | SYSTOLIC BLOOD PRESSURE: 127 MMHG

## 2025-04-30 DIAGNOSIS — C34.11 SQUAMOUS CELL CARCINOMA OF UPPER LOBE OF RIGHT LUNG: ICD-10-CM

## 2025-04-30 DIAGNOSIS — E03.2 HYPOTHYROIDISM DUE TO DRUGS: ICD-10-CM

## 2025-04-30 DIAGNOSIS — C34.31 SQUAMOUS CELL CARCINOMA OF LOWER LOBE OF RIGHT LUNG: ICD-10-CM

## 2025-04-30 DIAGNOSIS — C34.31 SQUAMOUS CELL CARCINOMA OF LOWER LOBE OF RIGHT LUNG: Primary | ICD-10-CM

## 2025-04-30 LAB
ALBUMIN SERPL BCP-MCNC: 3.8 G/DL (ref 3.4–5)
ALP SERPL-CCNC: 79 U/L (ref 33–136)
ALT SERPL W P-5'-P-CCNC: 12 U/L (ref 7–45)
ANION GAP SERPL CALC-SCNC: 12 MMOL/L (ref 10–20)
AST SERPL W P-5'-P-CCNC: 11 U/L (ref 9–39)
BASOPHILS # BLD AUTO: 0.08 X10*3/UL (ref 0–0.1)
BASOPHILS NFR BLD AUTO: 1.4 %
BILIRUB SERPL-MCNC: 0.3 MG/DL (ref 0–1.2)
BUN SERPL-MCNC: 16 MG/DL (ref 6–23)
CALCIUM SERPL-MCNC: 9 MG/DL (ref 8.6–10.3)
CHLORIDE SERPL-SCNC: 99 MMOL/L (ref 98–107)
CO2 SERPL-SCNC: 29 MMOL/L (ref 21–32)
CREAT SERPL-MCNC: 1.12 MG/DL (ref 0.5–1.05)
EGFRCR SERPLBLD CKD-EPI 2021: 51 ML/MIN/1.73M*2
EOSINOPHIL # BLD AUTO: 0.4 X10*3/UL (ref 0–0.4)
EOSINOPHIL NFR BLD AUTO: 6.8 %
ERYTHROCYTE [DISTWIDTH] IN BLOOD BY AUTOMATED COUNT: 16.4 % (ref 11.5–14.5)
GLUCOSE SERPL-MCNC: 273 MG/DL (ref 74–99)
HCT VFR BLD AUTO: 33.1 % (ref 36–46)
HGB BLD-MCNC: 10.2 G/DL (ref 12–16)
IMM GRANULOCYTES # BLD AUTO: 0.03 X10*3/UL (ref 0–0.5)
IMM GRANULOCYTES NFR BLD AUTO: 0.5 % (ref 0–0.9)
LYMPHOCYTES # BLD AUTO: 1.03 X10*3/UL (ref 0.8–3)
LYMPHOCYTES NFR BLD AUTO: 17.5 %
MCH RBC QN AUTO: 30.4 PG (ref 26–34)
MCHC RBC AUTO-ENTMCNC: 30.8 G/DL (ref 32–36)
MCV RBC AUTO: 99 FL (ref 80–100)
MONOCYTES # BLD AUTO: 0.37 X10*3/UL (ref 0.05–0.8)
MONOCYTES NFR BLD AUTO: 6.3 %
NEUTROPHILS # BLD AUTO: 3.99 X10*3/UL (ref 1.6–5.5)
NEUTROPHILS NFR BLD AUTO: 67.5 %
NRBC BLD-RTO: 0 /100 WBCS (ref 0–0)
PLATELET # BLD AUTO: 211 X10*3/UL (ref 150–450)
POTASSIUM SERPL-SCNC: 4.1 MMOL/L (ref 3.5–5.3)
PROT SERPL-MCNC: 6.4 G/DL (ref 6.4–8.2)
RBC # BLD AUTO: 3.36 X10*6/UL (ref 4–5.2)
SODIUM SERPL-SCNC: 136 MMOL/L (ref 136–145)
TSH SERPL-ACNC: 2 MIU/L (ref 0.44–3.98)
WBC # BLD AUTO: 5.9 X10*3/UL (ref 4.4–11.3)

## 2025-04-30 PROCEDURE — 99214 OFFICE O/P EST MOD 30 MIN: CPT | Mod: 25 | Performed by: INTERNAL MEDICINE

## 2025-04-30 PROCEDURE — 96413 CHEMO IV INFUSION 1 HR: CPT

## 2025-04-30 PROCEDURE — 85025 COMPLETE CBC W/AUTO DIFF WBC: CPT

## 2025-04-30 PROCEDURE — 2500000004 HC RX 250 GENERAL PHARMACY W/ HCPCS (ALT 636 FOR OP/ED): Mod: JZ,TB | Performed by: INTERNAL MEDICINE

## 2025-04-30 PROCEDURE — 84443 ASSAY THYROID STIM HORMONE: CPT

## 2025-04-30 PROCEDURE — 99214 OFFICE O/P EST MOD 30 MIN: CPT | Performed by: INTERNAL MEDICINE

## 2025-04-30 PROCEDURE — 1159F MED LIST DOCD IN RCRD: CPT | Performed by: INTERNAL MEDICINE

## 2025-04-30 PROCEDURE — 1125F AMNT PAIN NOTED PAIN PRSNT: CPT | Performed by: INTERNAL MEDICINE

## 2025-04-30 PROCEDURE — 80053 COMPREHEN METABOLIC PANEL: CPT

## 2025-04-30 RX ORDER — EPINEPHRINE 0.3 MG/.3ML
0.3 INJECTION SUBCUTANEOUS EVERY 5 MIN PRN
Status: DISCONTINUED | OUTPATIENT
Start: 2025-04-30 | End: 2025-04-30 | Stop reason: HOSPADM

## 2025-04-30 RX ORDER — ALBUTEROL SULFATE 0.83 MG/ML
3 SOLUTION RESPIRATORY (INHALATION) AS NEEDED
Status: DISCONTINUED | OUTPATIENT
Start: 2025-04-30 | End: 2025-04-30 | Stop reason: HOSPADM

## 2025-04-30 RX ORDER — FAMOTIDINE 10 MG/ML
20 INJECTION, SOLUTION INTRAVENOUS ONCE AS NEEDED
Status: DISCONTINUED | OUTPATIENT
Start: 2025-04-30 | End: 2025-04-30 | Stop reason: HOSPADM

## 2025-04-30 RX ORDER — HEPARIN SODIUM,PORCINE/PF 10 UNIT/ML
50 SYRINGE (ML) INTRAVENOUS AS NEEDED
OUTPATIENT
Start: 2025-04-30

## 2025-04-30 RX ORDER — HEPARIN SODIUM,PORCINE/PF 10 UNIT/ML
50 SYRINGE (ML) INTRAVENOUS AS NEEDED
Status: DISCONTINUED | OUTPATIENT
Start: 2025-04-30 | End: 2025-04-30 | Stop reason: HOSPADM

## 2025-04-30 RX ORDER — HEPARIN 100 UNIT/ML
500 SYRINGE INTRAVENOUS AS NEEDED
Status: DISCONTINUED | OUTPATIENT
Start: 2025-04-30 | End: 2025-04-30 | Stop reason: HOSPADM

## 2025-04-30 RX ORDER — HEPARIN 100 UNIT/ML
500 SYRINGE INTRAVENOUS AS NEEDED
OUTPATIENT
Start: 2025-04-30

## 2025-04-30 RX ORDER — DIPHENHYDRAMINE HYDROCHLORIDE 50 MG/ML
50 INJECTION, SOLUTION INTRAMUSCULAR; INTRAVENOUS AS NEEDED
Status: DISCONTINUED | OUTPATIENT
Start: 2025-04-30 | End: 2025-04-30 | Stop reason: HOSPADM

## 2025-04-30 RX ADMIN — HEPARIN 500 UNITS: 100 SYRINGE at 12:22

## 2025-04-30 RX ADMIN — SODIUM CHLORIDE 400 MG: 9 INJECTION, SOLUTION INTRAVENOUS at 11:47

## 2025-04-30 ASSESSMENT — PAIN SCALES - GENERAL
PAINLEVEL_OUTOF10: 0-NO PAIN
PAINLEVEL_OUTOF10: 10-WORST PAIN EVER

## 2025-04-30 NOTE — PROGRESS NOTES
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  TP53 mutated.        a. Ist line therapy with carboplatin/Taxol/Keytruda X 4 cycles from Feb to May 2024.        b. F/U PET scan from May 2024 with excellent response.          c. S/p stereotactic radiation to the brain lesion in March 2024.        d.  Maintenance immunotherapy with Keytruda beginning May 2024    CHIEF COMPLAINTS:  Patient is in the clinic today for evaluation of right lung squamous cell carcinoma and for continuation of the therapy and management of therapy-related effects.    HISTORY:  Janee Maddox is a 75 y.o. female with right lung squamous cell carcinoma.  She is a lifelong smoker and was admitted at [facility] in Nov 2023 with complaints of shortness of breath, chest discomfort and positive bacteremia.  During the evaluation she was noted to have a large cavitary lesion in the right lung along with pleural effusion.  CT scan at that time showed a 6.1 x 5 cm right lower lobe cavitary lung mass along with loculated right-sided pleural effusion and prominent mediastinal and hilar lymphadenopathy.  Overall findings were concerning for either abscess or baseline malignancy.  She was transferred to [facility] where a CT-guided biopsy confirmed invasive squamous cell carcinoma.  PD-L1 expression was 5%.  Tumor NGS panel was mainly suggestive of TP53 mutation.  During hospitalization her clinical course was also complicated by Pantoea bacteremia and an ESBL Ecoli UTI.  She was treated with antibiotics and other supportive care.  A follow-up PET scan from Jan 2024 confirmed increased metabolic activity in the right lower lobe pleural-based cavitary mass along with mediastinal and right hilar lymphadenopathy and right-sided pleural disease  consistent with known malignancy.  There was no evidence of distant metastatic disease.  Brain MRI showed small solitary left parietal metastatic lesion and she is s/p stereotactic radiation treatment in 2024.  She was treated with first-line therapy with carbo/Taxol/Keytruda  X 4 cycles from Feb to May 2024.  Treatment course was complicated by recurrent UTI and other issues.  Posttreatment follow-up PET scan from May 2024 showed excellent response.  She is currently being treated with maintenance immunotherapy with Keytruda since May 2024.    INTERVAL HISTORY:  She is tolerating immunotherapy well. Still has issues with recurrent UTIs.  Follow-up PET scan and brain MRI results have been unremarkable.  Overall significant improvement in her quality of life and functionality compared to the time when she was diagnosed with lung cancer.      PAST MEDICAL HISTORY:  1.  Right lower lobe squamous cell lung cancer as detailed above.  2.  Coronary artery disease  3.  Hypertension  4.  Hyperlipidemia  5.  GERD  6.  Anxiety/depression  7.  History of C. difficile colitis  8.  E. coli UTI.  9.  History of complete hysterectomy, cholecystectomy and incisional hernia surgeries    SOCIAL HISTORY:  She lives alone in [place].  Quit smoking in 2023.  1 pack a day for 60 years prior smoking history.  Admit to occasional alcohol intake.  She work as a tax preparor.  Born and raised in [place].    FAMILY HISTORY:  Father  at age 86 from coronary artery disease. Mother also  at age 86 from multiple medical issues.  She had 1 sister and 4/2 siblings.  One of them  from myocardial infarction.  3 children, 7 grandkids and 1 great grand kid ~ all alive and well.  No other family history of bleeding, clotting or malignant disorders in the family history.    REVIEW OF SYSTEMS:   Pertinent finding as per the history above.  All other systems have been reviewed and generally negative and  "noncontributory.    VITAL SIGNS  BSA: 1.69 meters squared  /81   Pulse 86   Temp 36.3 °C (97.3 °F)   Resp 20   Ht (S) 1.528 m (5' 0.16\")   Wt 67 kg (147 lb 11.3 oz)   SpO2 95%   BMI 28.70 kg/m²     PHYSICAL EXAMINATION:  Detailed physical examination not done.    LAB DATA:  CBC from today shows a hemoglobin of 10.2 with MCV of 99.  White cell count is normal at 5.9 and platelets are 211.  Metabolic profile and TSH are pending.  Last 3 sets of blood work were reviewed and the trend was noted.    RADIOLOGICAL DATA:  Brain MRI 04/22/2025   Impression:  1. Stable examination. No acute intracranial abnormality. No evidence of metastatic lesions.    PET scan 12/26/2024  Impression:  1. Interval mild decrease in size of right lower lobe cavitary mass, with mild decrease in extent of hypermetabolic activity extending to  the right chest wall, invading into the ribs. However, the intensity of activity at this site has increased as compared to prior, which is  concerning for disease progression.  2. No evidence of hypermetabolic lymphadenopathy, with continued decrease in hypermetabolic activity of mediastinal lymph nodes, when compared to prior.  3. No evidence of new hypermetabolic activity throughout the body to suggest new metastatic disease.     PATHOLOGY DATA:  Surgical Pathology Exam: [id number]   FINAL DIAGNOSIS   A. LUNG, RIGHT; BIOPSY:    -- INVASIVE SQUAMOUS CELL CARCINOMA, KERATINIZING. See comment   Electronically signed by Lino Carrillo MD on 12/1/2023      PD-L1 22C3  (KEYTRUDA)Tumor Proportion Score (TPS): 5%   MICROSATELLITE STATUS: Microsatellite Instability-High (MSI-H) is NOT DETECTED.  DISEASE ASSOCIATED GENOMIC FINDINGS:   TP53 p.G245V ([id number] c.734G>T)  DISEASE RELEVANT ALTERATIONS NOT DETECTED:   Negative for ALK fusion.  Negative for BRAF V600E.  Negative for EGFR sensitizing mutation.  Negative for ERBB2 activating mutation  Negative for KRAS G12C.  Negative for MET exon 14 skipping " mutation.  Negative for NTRK fusion.  Negative for RET fusion.  Negative for ROS1 fusion.    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  TP53 mutated.  Please refer  to the details of initial presentation and management as outlined above.  In summary she is a lifelong smoker and was noted to have a large cavitary right lower lung pleural-based lesion along with extensive mediastinal and hilar lymphadenopathy and loculated pleural effusion in November 2023.  CT-guided biopsy was confirmatory of invasive squamous cell carcinoma.  PD-L1 TPS score was 5%.  NGS panel was unremarkable other than finding of TP53 mutation.  Brain MRI showed a small 3 mm left frontal lobe lesion concerning for metastatic disease.  She received stereotactic brain radiation in March 2024 and was treated with first-line therapy with carbo/Taxol/Keytruda X 4 cycles from Feb to May 2024.  Treatment course was complicated by recurrent UTI and other issues.  Posttreatment follow-up PET scan from May 2024 showed excellent response.  She is currently being treated with maintenance immunotherapy with Keytruda since May 2024.    She will continue with maintenance immunotherapy with Keytruda at 6 weekly interval.  Probable benefit and side effect profile of mainly  weakness, fatigue, colitis, pneumonitis, endocrinopathies, transaminitis etc. were explained to them in detail.  Her overall prognosis remain guarded.    2.  Brain mets.  MRI results were suggestive of small solitary left parietal lobe lesion.  She is s/p stereotactic radiation therapy in Mar 2024. She will continue to F/U with radiation oncology team.    3.  Recurrent UTI.  She is being closely followed by ID team and is being treated with frequent antibiotic courses.    4.  Diarrhea.  She has been treated for C diff colitis with improvement in diarrhea.  She will continue to follow up with GI team.    5.  Follow-up.  Follow-up with me in 6 weeks. She  will also be scheduled for F/U PET scan prior to next OV.    This note has been transcribed using Dragon voice recognition system and there is a possibility of unintentional typing misprints.

## 2025-04-30 NOTE — SIGNIFICANT EVENT

## 2025-04-30 NOTE — PROGRESS NOTES
Followed up with pt. Pt is here for mariana. Pt has been doing well and just finished her antibiotics for her last UTI. Pt otherwise seems to be managing fairly well. Pt has questions about her EOB and the bill and wondering about the status of her financial application. Let her know Onc LISA would send out an email for her to check the status. Pt remains independent with function and self care.     Pt also had questions if she could apply again for the [employer] for some financial assistance this year. Let pt know they do not have applications available until June 1-June 30th. Gave pt print off on it. Let her know this could be something we could come back to in June.     Pt also had questions about the food program and how she gets $50 from her insurance and that groceries from the nutrition mission program would also help. Pt is within income guidelines. Will make referral to food program.

## 2025-05-27 ENCOUNTER — APPOINTMENT (OUTPATIENT)
Dept: UROLOGY | Facility: CLINIC | Age: 75
End: 2025-05-27
Payer: MEDICARE

## 2025-05-30 ENCOUNTER — APPOINTMENT (OUTPATIENT)
Dept: HEMATOLOGY/ONCOLOGY | Facility: CLINIC | Age: 75
End: 2025-05-30
Payer: MEDICARE

## 2025-06-05 ENCOUNTER — APPOINTMENT (OUTPATIENT)
Dept: UROLOGY | Facility: CLINIC | Age: 75
End: 2025-06-05
Payer: MEDICARE

## 2025-06-11 ENCOUNTER — APPOINTMENT (OUTPATIENT)
Dept: HEMATOLOGY/ONCOLOGY | Facility: CLINIC | Age: 75
End: 2025-06-11
Payer: MEDICARE

## 2025-06-11 ENCOUNTER — SOCIAL WORK (OUTPATIENT)
Dept: HEMATOLOGY/ONCOLOGY | Facility: CLINIC | Age: 75
End: 2025-06-11
Payer: MEDICARE

## 2025-06-11 DIAGNOSIS — C34.31 SQUAMOUS CELL CARCINOMA OF LOWER LOBE OF RIGHT LUNG: ICD-10-CM

## 2025-06-11 DIAGNOSIS — E03.2 HYPOTHYROIDISM DUE TO DRUGS: ICD-10-CM

## 2025-06-11 NOTE — PROGRESS NOTES
Called pt yesterday to let her know the [facility] assistance was open for month of June and it has been a year since she received it. Completed bernadette and would need pt to bring in bills for utilities, mortgage or any other household needs to submit copies to madelin. Pt was to bring all of that in today and complete her portion of madelin while here for treatment. Pt called early this am and cancelled her own appt for today.

## 2025-06-12 ENCOUNTER — HOSPITAL ENCOUNTER (OUTPATIENT)
Dept: RADIOLOGY | Facility: CLINIC | Age: 75
Discharge: HOME | End: 2025-06-12
Payer: MEDICARE

## 2025-06-12 DIAGNOSIS — E03.2 HYPOTHYROIDISM DUE TO DRUGS: ICD-10-CM

## 2025-06-12 DIAGNOSIS — C34.11 SQUAMOUS CELL CARCINOMA OF UPPER LOBE OF RIGHT LUNG: ICD-10-CM

## 2025-06-12 DIAGNOSIS — C34.31 SQUAMOUS CELL CARCINOMA OF LOWER LOBE OF RIGHT LUNG: ICD-10-CM

## 2025-06-12 PROCEDURE — 78815 PET IMAGE W/CT SKULL-THIGH: CPT | Mod: PS

## 2025-06-12 PROCEDURE — A9552 F18 FDG: HCPCS | Performed by: INTERNAL MEDICINE

## 2025-06-12 PROCEDURE — 3430000001 HC RX 343 DIAGNOSTIC RADIOPHARMACEUTICALS: Performed by: INTERNAL MEDICINE

## 2025-06-12 RX ORDER — FLUDEOXYGLUCOSE F 18 200 MCI/ML
13 INJECTION, SOLUTION INTRAVENOUS
Status: COMPLETED | OUTPATIENT
Start: 2025-06-12 | End: 2025-06-12

## 2025-06-12 RX ADMIN — FLUDEOXYGLUCOSE F 18 13 MILLICURIE: 200 INJECTION, SOLUTION INTRAVENOUS at 10:57

## 2025-06-17 RX ORDER — ALBUTEROL SULFATE 0.83 MG/ML
3 SOLUTION RESPIRATORY (INHALATION) AS NEEDED
Status: CANCELLED | OUTPATIENT
Start: 2025-06-18

## 2025-06-17 RX ORDER — PROCHLORPERAZINE MALEATE 10 MG
10 TABLET ORAL EVERY 6 HOURS PRN
Status: CANCELLED | OUTPATIENT
Start: 2025-06-18

## 2025-06-17 RX ORDER — EPINEPHRINE 0.3 MG/.3ML
0.3 INJECTION SUBCUTANEOUS EVERY 5 MIN PRN
Status: CANCELLED | OUTPATIENT
Start: 2025-06-18

## 2025-06-17 RX ORDER — DIPHENHYDRAMINE HYDROCHLORIDE 50 MG/ML
50 INJECTION, SOLUTION INTRAMUSCULAR; INTRAVENOUS AS NEEDED
Status: CANCELLED | OUTPATIENT
Start: 2025-06-18

## 2025-06-17 RX ORDER — FAMOTIDINE 10 MG/ML
20 INJECTION, SOLUTION INTRAVENOUS ONCE AS NEEDED
Status: CANCELLED | OUTPATIENT
Start: 2025-06-18

## 2025-06-17 RX ORDER — PROCHLORPERAZINE EDISYLATE 5 MG/ML
10 INJECTION INTRAMUSCULAR; INTRAVENOUS EVERY 6 HOURS PRN
Status: CANCELLED | OUTPATIENT
Start: 2025-06-18

## 2025-06-18 ENCOUNTER — INFUSION (OUTPATIENT)
Dept: HEMATOLOGY/ONCOLOGY | Facility: CLINIC | Age: 75
End: 2025-06-18
Payer: MEDICARE

## 2025-06-18 ENCOUNTER — SOCIAL WORK (OUTPATIENT)
Dept: HEMATOLOGY/ONCOLOGY | Facility: CLINIC | Age: 75
End: 2025-06-18

## 2025-06-18 ENCOUNTER — OFFICE VISIT (OUTPATIENT)
Dept: HEMATOLOGY/ONCOLOGY | Facility: CLINIC | Age: 75
End: 2025-06-18
Payer: MEDICARE

## 2025-06-18 VITALS
HEART RATE: 87 BPM | DIASTOLIC BLOOD PRESSURE: 73 MMHG | BODY MASS INDEX: 29.04 KG/M2 | RESPIRATION RATE: 20 BRPM | SYSTOLIC BLOOD PRESSURE: 128 MMHG | WEIGHT: 147.93 LBS | HEIGHT: 60 IN | OXYGEN SATURATION: 97 % | TEMPERATURE: 98.1 F

## 2025-06-18 VITALS
TEMPERATURE: 97.5 F | DIASTOLIC BLOOD PRESSURE: 74 MMHG | SYSTOLIC BLOOD PRESSURE: 124 MMHG | RESPIRATION RATE: 20 BRPM | HEART RATE: 86 BPM | OXYGEN SATURATION: 99 %

## 2025-06-18 DIAGNOSIS — E03.2 HYPOTHYROIDISM DUE TO DRUGS: ICD-10-CM

## 2025-06-18 DIAGNOSIS — C34.31 SQUAMOUS CELL CARCINOMA OF LOWER LOBE OF RIGHT LUNG: ICD-10-CM

## 2025-06-18 DIAGNOSIS — C34.31 SQUAMOUS CELL CARCINOMA OF LOWER LOBE OF RIGHT LUNG: Primary | ICD-10-CM

## 2025-06-18 LAB
ALBUMIN SERPL BCP-MCNC: 3.8 G/DL (ref 3.4–5)
ALP SERPL-CCNC: 68 U/L (ref 33–136)
ALT SERPL W P-5'-P-CCNC: 8 U/L (ref 7–45)
ANION GAP SERPL CALC-SCNC: 15 MMOL/L (ref 10–20)
AST SERPL W P-5'-P-CCNC: 11 U/L (ref 9–39)
BASOPHILS # BLD AUTO: 0.08 X10*3/UL (ref 0–0.1)
BASOPHILS NFR BLD AUTO: 1 %
BILIRUB SERPL-MCNC: 0.3 MG/DL (ref 0–1.2)
BUN SERPL-MCNC: 18 MG/DL (ref 6–23)
CALCIUM SERPL-MCNC: 8.9 MG/DL (ref 8.6–10.3)
CHLORIDE SERPL-SCNC: 104 MMOL/L (ref 98–107)
CO2 SERPL-SCNC: 23 MMOL/L (ref 21–32)
CREAT SERPL-MCNC: 1.12 MG/DL (ref 0.5–1.05)
EGFRCR SERPLBLD CKD-EPI 2021: 51 ML/MIN/1.73M*2
EOSINOPHIL # BLD AUTO: 0.41 X10*3/UL (ref 0–0.4)
EOSINOPHIL NFR BLD AUTO: 5.4 %
ERYTHROCYTE [DISTWIDTH] IN BLOOD BY AUTOMATED COUNT: 13.5 % (ref 11.5–14.5)
GLUCOSE SERPL-MCNC: 136 MG/DL (ref 74–99)
HCT VFR BLD AUTO: 35.4 % (ref 36–46)
HGB BLD-MCNC: 10.9 G/DL (ref 12–16)
IMM GRANULOCYTES # BLD AUTO: 0.04 X10*3/UL (ref 0–0.5)
IMM GRANULOCYTES NFR BLD AUTO: 0.5 % (ref 0–0.9)
LYMPHOCYTES # BLD AUTO: 0.86 X10*3/UL (ref 0.8–3)
LYMPHOCYTES NFR BLD AUTO: 11.2 %
MCH RBC QN AUTO: 29 PG (ref 26–34)
MCHC RBC AUTO-ENTMCNC: 30.8 G/DL (ref 32–36)
MCV RBC AUTO: 94 FL (ref 80–100)
MONOCYTES # BLD AUTO: 0.43 X10*3/UL (ref 0.05–0.8)
MONOCYTES NFR BLD AUTO: 5.6 %
NEUTROPHILS # BLD AUTO: 5.84 X10*3/UL (ref 1.6–5.5)
NEUTROPHILS NFR BLD AUTO: 76.3 %
NRBC BLD-RTO: 0 /100 WBCS (ref 0–0)
PLATELET # BLD AUTO: 184 X10*3/UL (ref 150–450)
POTASSIUM SERPL-SCNC: 4.6 MMOL/L (ref 3.5–5.3)
PROT SERPL-MCNC: 6.4 G/DL (ref 6.4–8.2)
RBC # BLD AUTO: 3.76 X10*6/UL (ref 4–5.2)
SODIUM SERPL-SCNC: 137 MMOL/L (ref 136–145)
TSH SERPL-ACNC: 1.3 MIU/L (ref 0.44–3.98)
WBC # BLD AUTO: 7.7 X10*3/UL (ref 4.4–11.3)

## 2025-06-18 PROCEDURE — 36591 DRAW BLOOD OFF VENOUS DEVICE: CPT

## 2025-06-18 PROCEDURE — 85025 COMPLETE CBC W/AUTO DIFF WBC: CPT

## 2025-06-18 PROCEDURE — 80053 COMPREHEN METABOLIC PANEL: CPT

## 2025-06-18 PROCEDURE — 96413 CHEMO IV INFUSION 1 HR: CPT

## 2025-06-18 PROCEDURE — 99215 OFFICE O/P EST HI 40 MIN: CPT | Mod: 25 | Performed by: INTERNAL MEDICINE

## 2025-06-18 PROCEDURE — 84443 ASSAY THYROID STIM HORMONE: CPT

## 2025-06-18 PROCEDURE — 99215 OFFICE O/P EST HI 40 MIN: CPT | Performed by: INTERNAL MEDICINE

## 2025-06-18 PROCEDURE — 1159F MED LIST DOCD IN RCRD: CPT | Performed by: INTERNAL MEDICINE

## 2025-06-18 PROCEDURE — 2500000004 HC RX 250 GENERAL PHARMACY W/ HCPCS (ALT 636 FOR OP/ED): Performed by: INTERNAL MEDICINE

## 2025-06-18 PROCEDURE — 1126F AMNT PAIN NOTED NONE PRSNT: CPT | Performed by: INTERNAL MEDICINE

## 2025-06-18 RX ORDER — HEPARIN 100 UNIT/ML
500 SYRINGE INTRAVENOUS AS NEEDED
Status: DISCONTINUED | OUTPATIENT
Start: 2025-06-18 | End: 2025-06-18 | Stop reason: HOSPADM

## 2025-06-18 RX ORDER — HEPARIN 100 UNIT/ML
500 SYRINGE INTRAVENOUS AS NEEDED
OUTPATIENT
Start: 2025-06-18

## 2025-06-18 RX ORDER — HEPARIN SODIUM,PORCINE/PF 10 UNIT/ML
50 SYRINGE (ML) INTRAVENOUS AS NEEDED
Status: CANCELLED | OUTPATIENT
Start: 2025-06-18

## 2025-06-18 RX ORDER — HEPARIN SODIUM,PORCINE/PF 10 UNIT/ML
50 SYRINGE (ML) INTRAVENOUS AS NEEDED
OUTPATIENT
Start: 2025-06-18

## 2025-06-18 RX ADMIN — SODIUM CHLORIDE 400 MG: 9 INJECTION, SOLUTION INTRAVENOUS at 12:19

## 2025-06-18 RX ADMIN — HEPARIN 500 UNITS: 100 SYRINGE at 12:59

## 2025-06-18 ASSESSMENT — PAIN SCALES - GENERAL: PAINLEVEL_OUTOF10: 0-NO PAIN

## 2025-06-18 NOTE — PROGRESS NOTES
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  TP53 mutated.        a. Ist line therapy with carboplatin/Taxol/Keytruda X 4 cycles from Feb to May 2024.        b. F/U PET scan from May 2024 with excellent response.          c. S/p stereotactic radiation to the brain lesion in March 2024.        d.  Maintenance immunotherapy with Keytruda beginning May 2024    CHIEF COMPLAINTS:  Patient is in the clinic today for evaluation of right lung squamous cell carcinoma and for continuation of the therapy and management of therapy-related effects.    HISTORY:  Janee Maddox is a 75 y.o. female with right lung squamous cell carcinoma.  She is a lifelong smoker and was [facility] with complaints of shortness of breath, chest discomfort and positive bacteremia.  During the evaluation she was noted to have a large cavitary lesion in the right lung along with pleural effusion.  CT scan at that time showed a 6.1 x 5 cm right lower lobe cavitary lung mass along with loculated right-sided pleural effusion and prominent mediastinal and hilar lymphadenopathy.  Overall findings were concerning for either abscess or baseline malignancy.  She was transferred to [facility] where a CT-guided biopsy confirmed invasive squamous cell carcinoma.  PD-L1 expression was 5%.  Tumor NGS panel was mainly suggestive of TP53 mutation.  During hospitalization her clinical course was also complicated by Pantoea bacteremia and an ESBL Ecoli UTI.  She was treated with antibiotics and other supportive care.  A follow-up PET scan from Jan 2024 confirmed increased metabolic activity in the right lower lobe pleural-based cavitary mass along with mediastinal and right hilar lymphadenopathy and  right-sided pleural disease consistent with known malignancy.  There was no evidence of distant metastatic disease.  Brain MRI showed small solitary left parietal metastatic lesion and she is s/p stereotactic radiation treatment in 2024.  She was treated with first-line therapy with carbo/Taxol/Keytruda  X 4 cycles from Feb to May 2024.  Treatment course was complicated by recurrent UTI and other issues.  Posttreatment follow-up PET scan from May 2024 showed excellent response.  She is currently being treated with maintenance immunotherapy with Keytruda since May 2024.    INTERVAL HISTORY:  She is tolerating immunotherapy well. Still has issues with recurrent UTIs.  Follow-up PET scan and brain MRI results have been unremarkable.      PAST MEDICAL HISTORY:  1.  Right lower lobe squamous cell lung cancer as detailed above.  2.  Coronary artery disease  3.  Hypertension  4.  Hyperlipidemia  5.  GERD  6.  Anxiety/depression  7.  History of C. difficile colitis  8.  E. coli UTI.  9.  History of complete hysterectomy, cholecystectomy and incisional hernia surgeries    SOCIAL HISTORY:  She lives alone in [place].  Quit smoking in 2023.  1 pack a day for 60 years prior smoking history.  Admit to occasional alcohol intake.  She work as a tax preparor.  Born and raised in [place].    FAMILY HISTORY:  Father  at age 86 from coronary artery disease. Mother also  at age 86 from multiple medical issues.  She had 1 sister and 4/2 siblings.  One of them  from myocardial infarction.  3 children, 7 grandkids and 1 great grand kid ~ all alive and well.  No other family history of bleeding, clotting or malignant disorders in the family history.    REVIEW OF SYSTEMS:   Pertinent finding as per the history above.  All other systems have been reviewed and generally negative and noncontributory.    VITAL SIGNS  BSA: 1.69 meters squared  /73   Pulse 87   Temp 36.7 °C (98.1 °F)   Resp 20   Ht (S)  "1.528 m (5' 0.16\")   Wt 67.1 kg (147 lb 14.9 oz)   SpO2 97%   BMI 28.74 kg/m²     PHYSICAL EXAMINATION:  Detailed physical examination not done.    LAB DATA:  CBC from today shows a hemoglobin of 10.9 with MCV of 94.  White cell count is normal at 5.9 and platelets are 211.  Metabolic profile is unremarkable and TSH is pending.  Last 3 sets of blood work were reviewed and the trend was noted.    RADIOLOGICAL DATA:  PET scan 06/13/2025  Impression:  Continued interval decrease in size and hypermetabolic activity involving the right lower lobe cavitary mass extending into the adjacent chest wall and spine compatible with treatment response, but still viable neoplasm. No new sites of disease identified.      Brain MRI 04/22/2025   Impression:  1. Stable examination. No acute intracranial abnormality. No evidence of metastatic lesions.  PATHOLOGY DATA:  Surgical Pathology Exam: [id number]   FINAL DIAGNOSIS   A. LUNG, RIGHT; BIOPSY:    -- INVASIVE SQUAMOUS CELL CARCINOMA, KERATINIZING. See comment   Electronically signed by Lino Carrillo MD on 12/1/2023      PD-L1 22C3  (KEYTRUDA)Tumor Proportion Score (TPS): 5%   MICROSATELLITE STATUS: Microsatellite Instability-High (MSI-H) is NOT DETECTED.  DISEASE ASSOCIATED GENOMIC FINDINGS:   TP53 p.G245V ([id number] c.734G>T)  DISEASE RELEVANT ALTERATIONS NOT DETECTED:   Negative for ALK fusion.  Negative for BRAF V600E.  Negative for EGFR sensitizing mutation.  Negative for ERBB2 activating mutation  Negative for KRAS G12C.  Negative for MET exon 14 skipping mutation.  Negative for NTRK fusion.  Negative for RET fusion.  Negative for ROS1 fusion.    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  TP53 mutated.  Please   refer to the details of initial presentation and management as outlined above.  In summary she is a lifelong smoker and was noted to have a large cavitary right lower lung pleural-based lesion along with extensive mediastinal " and hilar lymphadenopathy and loculated pleural effusion in November 2023.  CT-guided biopsy was confirmatory of invasive squamous cell carcinoma.  PD-L1 TPS score was 5%.  NGS panel was unremarkable other than finding of TP53 mutation.  Brain MRI showed a small 3 mm left frontal lobe lesion concerning for metastatic disease.  She received stereotactic brain radiation in March 2024 and was treated with first-line therapy with carbo/Taxol/Keytruda X 4 cycles from Feb to May 2024.  Treatment course was complicated by recurrent UTI and other issues.  Posttreatment follow-up PET scan from May 2024 showed excellent response.  She is currently being treated with maintenance immunotherapy with Keytruda since May 2024.    Latest PET scan is unremarkable. She will continue with maintenance immunotherapy with Keytruda at 6 weekly interval.  Probable benefit and side effect profile of mainly  weakness, fatigue, colitis, pneumonitis, endocrinopathies, transaminitis etc. were explained to them in detail.  Her overall prognosis remain guarded.    2.  Brain mets.  MRI results were suggestive of small solitary left parietal lobe lesion.  She is s/p stereotactic radiation therapy in Mar 2024. She will continue to F/U with radiation oncology team.    3.  Recurrent UTI.  She is being closely followed by ID team and is being treated with frequent antibiotic courses.    4.  Diarrhea.  She has been treated for C diff colitis with improvement in diarrhea.  She will continue to follow up with GI team.    5.  Follow-up.  Follow-up with me in 6 weeks.    This note has been transcribed using Dragon voice recognition system and there is a possibility of unintentional typing misprints.

## 2025-06-18 NOTE — PROGRESS NOTES
Pt rescheduled her treatment for today. Followed up with pt to gather items she wanted to submit those expenses she may qualify for financial assistance under the [employer]. Pt completed her portion and signed the application. Onc LISA mailed completed and full bernadette for pt.

## 2025-06-18 NOTE — SIGNIFICANT EVENT

## 2025-07-01 ENCOUNTER — APPOINTMENT (OUTPATIENT)
Dept: BEHAVIORAL HEALTH | Facility: CLINIC | Age: 75
End: 2025-07-01
Payer: MEDICARE

## 2025-07-11 ENCOUNTER — APPOINTMENT (OUTPATIENT)
Dept: HEMATOLOGY/ONCOLOGY | Facility: CLINIC | Age: 75
End: 2025-07-11
Payer: MEDICARE

## 2025-07-15 ENCOUNTER — APPOINTMENT (OUTPATIENT)
Dept: BEHAVIORAL HEALTH | Facility: CLINIC | Age: 75
End: 2025-07-15
Payer: MEDICARE

## 2025-07-22 ENCOUNTER — TELEPHONE (OUTPATIENT)
Dept: UROLOGY | Facility: CLINIC | Age: 75
End: 2025-07-22
Payer: MEDICARE

## 2025-07-22 ENCOUNTER — TELEPHONE (OUTPATIENT)
Dept: HEMATOLOGY/ONCOLOGY | Facility: CLINIC | Age: 75
End: 2025-07-22
Payer: MEDICARE

## 2025-07-22 DIAGNOSIS — N39.44 NOCTURNAL ENURESIS: Primary | ICD-10-CM

## 2025-07-22 RX ORDER — SOLIFENACIN SUCCINATE 10 MG/1
10 TABLET, FILM COATED ORAL DAILY
Qty: 90 TABLET | Refills: 3 | Status: SHIPPED | OUTPATIENT
Start: 2025-07-22 | End: 2026-07-22

## 2025-07-22 NOTE — TELEPHONE ENCOUNTER
Reason for Conversation  appointment questions    Background   Pt calling. She states that she needs to speak to Kenia regarding her upcoming appointments. She states she doesn't think they have been scheduled correctly. Pls call back at [phone]    Disposition   No disposition on file.

## 2025-07-22 NOTE — TELEPHONE ENCOUNTER
Pt is requesting refill of Solifenacin be sent to her local [facility]. Pt has fu appt in Aug, thanks.   Reviewed with Dr. Flores. Patient to reschedule for:    2/2 1:30 labs (CBC, CMP, TSH)  2:00 pm MD with Keytruda to follow.     Message routed to PSR to call patient to schedule.

## 2025-07-23 ENCOUNTER — APPOINTMENT (OUTPATIENT)
Dept: HEMATOLOGY/ONCOLOGY | Facility: CLINIC | Age: 75
End: 2025-07-23
Payer: MEDICARE

## 2025-07-23 NOTE — TELEPHONE ENCOUNTER
Phoned patient back, last treatment was 6.18 so 6 weeks later is 7.30 - appointments appear to be scheduled correctly.

## 2025-07-25 ENCOUNTER — APPOINTMENT (OUTPATIENT)
Dept: HEMATOLOGY/ONCOLOGY | Facility: CLINIC | Age: 75
End: 2025-07-25
Payer: MEDICARE

## 2025-07-29 RX ORDER — PROCHLORPERAZINE MALEATE 10 MG
10 TABLET ORAL EVERY 6 HOURS PRN
OUTPATIENT
Start: 2025-07-30

## 2025-07-29 RX ORDER — EPINEPHRINE 0.3 MG/.3ML
0.3 INJECTION SUBCUTANEOUS EVERY 5 MIN PRN
OUTPATIENT
Start: 2025-07-30

## 2025-07-29 RX ORDER — PROCHLORPERAZINE EDISYLATE 5 MG/ML
10 INJECTION INTRAMUSCULAR; INTRAVENOUS EVERY 6 HOURS PRN
OUTPATIENT
Start: 2025-07-30

## 2025-07-29 RX ORDER — FAMOTIDINE 10 MG/ML
20 INJECTION, SOLUTION INTRAVENOUS ONCE AS NEEDED
OUTPATIENT
Start: 2025-07-30

## 2025-07-29 RX ORDER — ALBUTEROL SULFATE 0.83 MG/ML
3 SOLUTION RESPIRATORY (INHALATION) AS NEEDED
OUTPATIENT
Start: 2025-07-30

## 2025-07-29 RX ORDER — DIPHENHYDRAMINE HYDROCHLORIDE 50 MG/ML
50 INJECTION, SOLUTION INTRAMUSCULAR; INTRAVENOUS AS NEEDED
OUTPATIENT
Start: 2025-07-30

## 2025-07-30 ENCOUNTER — APPOINTMENT (OUTPATIENT)
Dept: HEMATOLOGY/ONCOLOGY | Facility: CLINIC | Age: 75
End: 2025-07-30
Payer: MEDICARE

## 2025-07-30 ENCOUNTER — TELEPHONE (OUTPATIENT)
Dept: HEMATOLOGY/ONCOLOGY | Facility: CLINIC | Age: 75
End: 2025-07-30
Payer: MEDICARE

## 2025-07-30 DIAGNOSIS — E03.2 HYPOTHYROIDISM DUE TO DRUGS: ICD-10-CM

## 2025-07-30 DIAGNOSIS — Z86.19 HISTORY OF CLOSTRIDIOIDES DIFFICILE INFECTION: ICD-10-CM

## 2025-07-30 DIAGNOSIS — C34.31 SQUAMOUS CELL CARCINOMA OF LOWER LOBE OF RIGHT LUNG: Primary | ICD-10-CM

## 2025-07-30 DIAGNOSIS — C79.31 MALIGNANT NEOPLASM METASTATIC TO BRAIN (MULTI): ICD-10-CM

## 2025-07-30 DIAGNOSIS — R19.7 DIARRHEA, UNSPECIFIED TYPE: ICD-10-CM

## 2025-07-30 DIAGNOSIS — A08.4 AIDS-ASSOCIATED SECRETORY DIARRHEA: ICD-10-CM

## 2025-07-30 NOTE — TELEPHONE ENCOUNTER
Patient with history of c. Diff related to chronic ATB use for UTIs.  Will do multiple stool samples per Dr. Gonsales, patient will have family member  items to collect samples.

## 2025-07-30 NOTE — TELEPHONE ENCOUNTER
Reason for Conversation  Diarrhea    Background   Pt called cancelling appointments for today, she needs to reschedule. Having bad diarrhea and thinks she may have C-Diff. Please call [phone]    Disposition   No disposition on file.

## 2025-08-07 ENCOUNTER — APPOINTMENT (OUTPATIENT)
Dept: UROLOGY | Facility: CLINIC | Age: 75
End: 2025-08-07
Payer: MEDICARE

## 2025-08-11 ENCOUNTER — HOSPITAL ENCOUNTER (OUTPATIENT)
Dept: RADIOLOGY | Facility: CLINIC | Age: 75
Discharge: HOME | End: 2025-08-11
Payer: MEDICARE

## 2025-08-11 DIAGNOSIS — C79.31 MALIGNANT NEOPLASM METASTATIC TO BRAIN (MULTI): ICD-10-CM

## 2025-08-11 PROCEDURE — A9575 INJ GADOTERATE MEGLUMI 0.1ML: HCPCS

## 2025-08-11 PROCEDURE — 2550000001 HC RX 255 CONTRASTS

## 2025-08-11 PROCEDURE — 70553 MRI BRAIN STEM W/O & W/DYE: CPT | Performed by: RADIOLOGY

## 2025-08-11 PROCEDURE — 70553 MRI BRAIN STEM W/O & W/DYE: CPT

## 2025-08-11 RX ORDER — GADOTERATE MEGLUMINE 376.9 MG/ML
14 INJECTION INTRAVENOUS
Status: COMPLETED | OUTPATIENT
Start: 2025-08-11 | End: 2025-08-11

## 2025-08-11 RX ADMIN — GADOTERATE MEGLUMINE 14 ML: 376.9 INJECTION INTRAVENOUS at 11:50

## 2025-08-12 ENCOUNTER — TELEPHONE (OUTPATIENT)
Dept: RADIATION ONCOLOGY | Facility: HOSPITAL | Age: 75
End: 2025-08-12
Payer: MEDICARE

## 2025-08-13 ENCOUNTER — HOSPITAL ENCOUNTER (OUTPATIENT)
Dept: RADIATION ONCOLOGY | Facility: HOSPITAL | Age: 75
Setting detail: RADIATION/ONCOLOGY SERIES
Discharge: HOME | End: 2025-08-13
Payer: MEDICARE

## 2025-08-13 DIAGNOSIS — C34.31 SQUAMOUS CELL CARCINOMA OF LOWER LOBE OF RIGHT LUNG: Primary | ICD-10-CM

## 2025-08-13 DIAGNOSIS — C79.31 MALIGNANT NEOPLASM METASTATIC TO BRAIN (MULTI): ICD-10-CM

## 2025-08-13 DIAGNOSIS — G47.09 OTHER INSOMNIA: ICD-10-CM

## 2025-08-13 DIAGNOSIS — R20.0 NUMBNESS AND TINGLING: ICD-10-CM

## 2025-08-13 DIAGNOSIS — R20.2 NUMBNESS AND TINGLING: ICD-10-CM

## 2025-08-13 PROCEDURE — 99214 OFFICE O/P EST MOD 30 MIN: CPT

## 2025-08-13 PROCEDURE — 99214 OFFICE O/P EST MOD 30 MIN: CPT | Mod: 95

## 2025-08-13 ASSESSMENT — ENCOUNTER SYMPTOMS
NAUSEA: 0
EYE PROBLEMS: 0
SEIZURES: 0
CONFUSION: 0
HEMATURIA: 0
VOMITING: 0
ARTHRALGIAS: 0
ADENOPATHY: 0
SPEECH DIFFICULTY: 0
ABDOMINAL DISTENTION: 0
NECK PAIN: 0
FEVER: 0
LIGHT-HEADEDNESS: 0
NECK STIFFNESS: 0
BACK PAIN: 0
WHEEZING: 0
LEG SWELLING: 0
TROUBLE SWALLOWING: 0
HEADACHES: 0
CONSTIPATION: 0
COUGH: 0
DIARRHEA: 0
DIFFICULTY URINATING: 0
EXTREMITY WEAKNESS: 1
ABDOMINAL PAIN: 0
CHEST TIGHTNESS: 0
NERVOUS/ANXIOUS: 0
DEPRESSION: 0
DIZZINESS: 0
CHILLS: 0
SHORTNESS OF BREATH: 0
FATIGUE: 0

## 2025-08-14 ENCOUNTER — DOCUMENTATION (OUTPATIENT)
Dept: BEHAVIORAL HEALTH | Facility: CLINIC | Age: 75
End: 2025-08-14
Payer: MEDICARE

## 2025-08-14 DIAGNOSIS — F41.1 GAD (GENERALIZED ANXIETY DISORDER): ICD-10-CM

## 2025-08-14 RX ORDER — LORAZEPAM 1 MG/1
TABLET ORAL
Qty: 45 TABLET | Refills: 0 | Status: SHIPPED | OUTPATIENT
Start: 2025-08-14

## 2025-08-19 ENCOUNTER — APPOINTMENT (OUTPATIENT)
Dept: BEHAVIORAL HEALTH | Facility: CLINIC | Age: 75
End: 2025-08-19
Payer: MEDICARE

## 2025-08-31 PROBLEM — F33.0 MILD RECURRENT MAJOR DEPRESSION: Status: ACTIVE | Noted: 2025-08-31

## 2025-09-05 ENCOUNTER — OFFICE VISIT (OUTPATIENT)
Dept: HEMATOLOGY/ONCOLOGY | Facility: CLINIC | Age: 75
End: 2025-09-05
Payer: MEDICARE

## 2025-09-05 ENCOUNTER — INFUSION (OUTPATIENT)
Dept: HEMATOLOGY/ONCOLOGY | Facility: CLINIC | Age: 75
End: 2025-09-05
Payer: MEDICARE

## 2025-09-05 ENCOUNTER — SOCIAL WORK (OUTPATIENT)
Dept: HEMATOLOGY/ONCOLOGY | Facility: CLINIC | Age: 75
End: 2025-09-05

## 2025-09-05 VITALS
SYSTOLIC BLOOD PRESSURE: 126 MMHG | WEIGHT: 140.65 LBS | BODY MASS INDEX: 27.33 KG/M2 | HEART RATE: 78 BPM | TEMPERATURE: 97.5 F | DIASTOLIC BLOOD PRESSURE: 57 MMHG | OXYGEN SATURATION: 97 % | RESPIRATION RATE: 18 BRPM

## 2025-09-05 VITALS
RESPIRATION RATE: 18 BRPM | HEART RATE: 83 BPM | OXYGEN SATURATION: 98 % | TEMPERATURE: 97.5 F | DIASTOLIC BLOOD PRESSURE: 55 MMHG | SYSTOLIC BLOOD PRESSURE: 110 MMHG

## 2025-09-05 DIAGNOSIS — C79.31 MALIGNANT NEOPLASM METASTATIC TO BRAIN (MULTI): ICD-10-CM

## 2025-09-05 DIAGNOSIS — C34.31 SQUAMOUS CELL CARCINOMA OF LOWER LOBE OF RIGHT LUNG: ICD-10-CM

## 2025-09-05 DIAGNOSIS — E03.2 HYPOTHYROIDISM DUE TO DRUGS: ICD-10-CM

## 2025-09-05 LAB
ALBUMIN SERPL BCP-MCNC: 4.3 G/DL (ref 3.4–5)
ALP SERPL-CCNC: 70 U/L (ref 33–136)
ALT SERPL W P-5'-P-CCNC: 7 U/L (ref 7–45)
ANION GAP SERPL CALC-SCNC: 12 MMOL/L (ref 10–20)
AST SERPL W P-5'-P-CCNC: 9 U/L (ref 9–39)
BASOPHILS # BLD AUTO: 0.09 X10*3/UL (ref 0–0.1)
BASOPHILS NFR BLD AUTO: 1.4 %
BILIRUB SERPL-MCNC: 0.3 MG/DL (ref 0–1.2)
BUN SERPL-MCNC: 17 MG/DL (ref 6–23)
CALCIUM SERPL-MCNC: 9.1 MG/DL (ref 8.6–10.3)
CHLORIDE SERPL-SCNC: 105 MMOL/L (ref 98–107)
CO2 SERPL-SCNC: 25 MMOL/L (ref 21–32)
CREAT SERPL-MCNC: 1.37 MG/DL (ref 0.5–1.05)
EGFRCR SERPLBLD CKD-EPI 2021: 40 ML/MIN/1.73M*2
EOSINOPHIL # BLD AUTO: 0.61 X10*3/UL (ref 0–0.4)
EOSINOPHIL NFR BLD AUTO: 9.3 %
ERYTHROCYTE [DISTWIDTH] IN BLOOD BY AUTOMATED COUNT: 14.6 % (ref 11.5–14.5)
GLUCOSE SERPL-MCNC: 139 MG/DL (ref 74–99)
HCT VFR BLD AUTO: 38.2 % (ref 36–46)
HGB BLD-MCNC: 11.5 G/DL (ref 12–16)
IMM GRANULOCYTES # BLD AUTO: 0.04 X10*3/UL (ref 0–0.5)
IMM GRANULOCYTES NFR BLD AUTO: 0.6 % (ref 0–0.9)
LYMPHOCYTES # BLD AUTO: 0.98 X10*3/UL (ref 0.8–3)
LYMPHOCYTES NFR BLD AUTO: 14.9 %
MCH RBC QN AUTO: 27.4 PG (ref 26–34)
MCHC RBC AUTO-ENTMCNC: 30.1 G/DL (ref 32–36)
MCV RBC AUTO: 91 FL (ref 80–100)
MONOCYTES # BLD AUTO: 0.4 X10*3/UL (ref 0.05–0.8)
MONOCYTES NFR BLD AUTO: 6.1 %
NEUTROPHILS # BLD AUTO: 4.44 X10*3/UL (ref 1.6–5.5)
NEUTROPHILS NFR BLD AUTO: 67.7 %
NRBC BLD-RTO: 0 /100 WBCS (ref 0–0)
PLATELET # BLD AUTO: 174 X10*3/UL (ref 150–450)
POTASSIUM SERPL-SCNC: 3.9 MMOL/L (ref 3.5–5.3)
PROT SERPL-MCNC: 7.1 G/DL (ref 6.4–8.2)
RBC # BLD AUTO: 4.19 X10*6/UL (ref 4–5.2)
SODIUM SERPL-SCNC: 138 MMOL/L (ref 136–145)
TSH SERPL-ACNC: 1.23 MIU/L (ref 0.44–3.98)
WBC # BLD AUTO: 6.6 X10*3/UL (ref 4.4–11.3)

## 2025-09-05 PROCEDURE — 99214 OFFICE O/P EST MOD 30 MIN: CPT | Performed by: INTERNAL MEDICINE

## 2025-09-05 PROCEDURE — 99214 OFFICE O/P EST MOD 30 MIN: CPT | Mod: 25 | Performed by: INTERNAL MEDICINE

## 2025-09-05 PROCEDURE — 96413 CHEMO IV INFUSION 1 HR: CPT

## 2025-09-05 PROCEDURE — 2500000004 HC RX 250 GENERAL PHARMACY W/ HCPCS (ALT 636 FOR OP/ED): Performed by: INTERNAL MEDICINE

## 2025-09-05 PROCEDURE — 1126F AMNT PAIN NOTED NONE PRSNT: CPT | Performed by: INTERNAL MEDICINE

## 2025-09-05 PROCEDURE — 84443 ASSAY THYROID STIM HORMONE: CPT

## 2025-09-05 PROCEDURE — 85025 COMPLETE CBC W/AUTO DIFF WBC: CPT

## 2025-09-05 PROCEDURE — 1159F MED LIST DOCD IN RCRD: CPT | Performed by: INTERNAL MEDICINE

## 2025-09-05 PROCEDURE — 80053 COMPREHEN METABOLIC PANEL: CPT

## 2025-09-05 RX ORDER — HEPARIN SODIUM,PORCINE/PF 10 UNIT/ML
50 SYRINGE (ML) INTRAVENOUS AS NEEDED
OUTPATIENT
Start: 2025-09-05

## 2025-09-05 RX ORDER — HEPARIN 100 UNIT/ML
500 SYRINGE INTRAVENOUS AS NEEDED
Status: DISCONTINUED | OUTPATIENT
Start: 2025-09-05 | End: 2025-09-05 | Stop reason: HOSPADM

## 2025-09-05 RX ORDER — HEPARIN 100 UNIT/ML
500 SYRINGE INTRAVENOUS AS NEEDED
OUTPATIENT
Start: 2025-09-05

## 2025-09-05 RX ADMIN — HEPARIN 500 UNITS: 100 SYRINGE at 14:43

## 2025-09-05 RX ADMIN — SODIUM CHLORIDE 400 MG: 9 INJECTION, SOLUTION INTRAVENOUS at 14:03

## 2025-09-05 ASSESSMENT — PAIN SCALES - GENERAL: PAINLEVEL_OUTOF10: 0-NO PAIN

## 2025-09-09 ENCOUNTER — APPOINTMENT (OUTPATIENT)
Dept: UROLOGY | Facility: CLINIC | Age: 75
End: 2025-09-09
Payer: MEDICARE

## 2025-10-28 ENCOUNTER — APPOINTMENT (OUTPATIENT)
Dept: BEHAVIORAL HEALTH | Facility: CLINIC | Age: 75
End: 2025-10-28
Payer: MEDICARE